# Patient Record
Sex: FEMALE | Race: BLACK OR AFRICAN AMERICAN | Employment: FULL TIME | URBAN - METROPOLITAN AREA
[De-identification: names, ages, dates, MRNs, and addresses within clinical notes are randomized per-mention and may not be internally consistent; named-entity substitution may affect disease eponyms.]

---

## 2017-01-19 ENCOUNTER — OFFICE VISIT (OUTPATIENT)
Dept: OBGYN CLINIC | Age: 22
End: 2017-01-19

## 2017-01-19 VITALS
RESPIRATION RATE: 16 BRPM | HEART RATE: 96 BPM | TEMPERATURE: 98.6 F | SYSTOLIC BLOOD PRESSURE: 141 MMHG | WEIGHT: 293 LBS | BODY MASS INDEX: 48.82 KG/M2 | HEIGHT: 65 IN | DIASTOLIC BLOOD PRESSURE: 75 MMHG

## 2017-01-19 DIAGNOSIS — N92.6 IRREGULAR MENSES: ICD-10-CM

## 2017-01-19 DIAGNOSIS — E66.01 OBESITY, MORBID, BMI 50 OR HIGHER (HCC): Primary | ICD-10-CM

## 2017-01-19 DIAGNOSIS — E28.2 PCOS (POLYCYSTIC OVARIAN SYNDROME): ICD-10-CM

## 2017-01-19 RX ORDER — NORGESTIMATE AND ETHINYL ESTRADIOL 0.25-0.035
1 KIT ORAL DAILY
Qty: 3 PACKAGE | Refills: 4 | Status: SHIPPED | OUTPATIENT
Start: 2017-01-19 | End: 2017-02-20 | Stop reason: SDUPTHER

## 2017-01-19 NOTE — MR AVS SNAPSHOT
Visit Information Date & Time Provider Department Dept. Phone Encounter #  
 1/19/2017 10:00 AM [facility] [phone] Follow-up Instructions Return in about 6 months (around 7/19/2017). Upcoming Health Maintenance Date Due  
 HPV AGE 9Y-34Y (1 of 3 - Female 3 Dose Series) 12/6/2007 INFLUENZA AGE 9 TO ADULT 8/1/2016 Allergies as of 1/19/2017  Review Complete On: 1/19/2017 By: Mesha Ramsay MD  
  
 Severity Noted Reaction Type Reactions Other Plant, Animal, Environmental  04/28/2015    Runny Nose Seasonal allergies Current Immunizations  Never Reviewed No immunizations on file. Not reviewed this visit You Were Diagnosed With   
  
 Codes Comments Obesity, morbid, BMI 50 or higher ([facility]    -  Primary ICD-10-CM: E66.01 
ICD-9-CM: 278.01   
 PCOS (polycystic ovarian syndrome)     ICD-10-CM: E28.2 ICD-9-CM: 256.4 Irregular menses     ICD-10-CM: N92.6 ICD-9-CM: 626.4 Vitals BP Pulse Temp Resp Height(growth percentile) Weight(growth percentile) 141/75 (BP 1 Location: Left arm, BP Patient Position: Sitting) 96 98.6 °F (37 °C) (Oral) 16 5' 4.8\" (1.646 m) 314 lb 12.8 oz (142.8 kg) BMI OB Status Smoking Status 52.71 kg/m2 Medically Induced Never Smoker Vitals History BMI and BSA Data Body Mass Index Body Surface Area 52.71 kg/m 2 2.56 m 2 Preferred Pharmacy Pharmacy Name Phone [id number] [phone] Your Updated Medication List  
  
   
This list is accurate as of: 1/19/17 10:53 AM.  Always use your most recent med list.  
  
  
  
  
 albuterol 90 mcg/actuation inhaler Commonly known as:  PROVENTIL HFA, VENTOLIN HFA, PROAIR HFA Take 2 Puffs by inhalation every four (4) hours as needed for Wheezing or Shortness of Breath. ibuprofen 600 mg tablet Commonly known as:  MOTRIN Take 1 Tab by mouth every eight (8) hours as needed for Pain. JUNEL 1/20 (21) 1-20 mg-mcg Tab Generic drug:  norethindrone-ethinyl estradiol Take  by mouth.  
  
 metFORMIN 1,000 mg tablet Commonly known as:  GLUCOPHAGE Take 1 Tab by mouth two (2) times daily (with meals). polyethylene glycol 17 gram packet Commonly known as:  Stacie Tristen Take 1 Packet by mouth daily. SINGULAIR 10 mg tablet Generic drug:  montelukast  
Take 10 mg by mouth daily. SYMBICORT 160-4.5 mcg/actuation HFA inhaler Generic drug:  budesonide-formoterol Take 2 Puffs by inhalation two (2) times a day. Follow-up Instructions Return in about 6 months (around 7/19/2017). Patient Instructions Starting a Weight Loss Plan: Care Instructions Your Care Instructions If you are thinking about losing weight, it can be hard to know where to start. Your doctor can help you set up a weight loss plan that best meets your needs. You may want to take a class on nutrition or exercise, or join a weight loss support group. If you have questions about how to make changes to your eating or exercise habits, ask your doctor about seeing a registered dietitian or an exercise specialist. 
It can be a big challenge to lose weight. But you do not have to make huge changes at once. Make small changes, and stick with them. When those changes become habit, add a few more changes. If you do not think you are ready to make changes right now, try to pick a date in the future. Make an appointment to see your doctor to discuss whether the time is right for you to start a plan. Follow-up care is a key part of your treatment and safety. Be sure to make and go to all appointments, and call your doctor if you are having problems. Its also a good idea to know your test results and keep a list of the medicines you take. How can you care for yourself at home? · Set realistic goals. Many people expect to lose much more weight than is likely. A weight loss of 5% to 10% of your body weight may be enough to improve your health. · Get family and friends involved to provide support. Talk to them about why you are trying to lose weight, and ask them to help. They can help by participating in exercise and having meals with you, even if they may be eating something different. · Find what works best for you. If you do not have time or do not like to cook, a program that offers meal replacement bars or shakes may be better for you. Or if you like to prepare meals, finding a plan that includes daily menus and recipes may be best. 
· Ask your doctor about other health professionals who can help you achieve your weight loss goals. ¨ A dietitian can help you make healthy changes in your diet. ¨ An exercise specialist or  can help you develop a safe and effective exercise program. 
¨ A counselor or psychiatrist can help you cope with issues such as depression, anxiety, or family problems that can make it hard to focus on weight loss. · Consider joining a support group for people who are trying to lose weight. Your doctor can suggest groups in your area. Where can you learn more? Go to http://lois-iris.info/. Enter A039 in the search box to learn more about \"Starting a Weight Loss Plan: Care Instructions. \" Current as of: February 16, 2016 Content Version: 11.1 © 2457-7189 [employer]. Care instructions adapted under license by [employer] (which disclaims liability or warranty for this information). If you have questions about a medical condition or this instruction, always ask your healthcare professional. [place] [address] any warranty or liability for your use of this information. Starting a Weight Loss Plan: Care Instructions Your Care Instructions If you are thinking about losing weight, it can be hard to know where to start. Your doctor can help you set up a weight loss plan that best meets your needs. You may want to take a class on nutrition or exercise, or join a weight loss support group. If you have questions about how to make changes to your eating or exercise habits, ask your doctor about seeing a registered dietitian or an exercise specialist. 
It can be a big challenge to lose weight. But you do not have to make huge changes at once. Make small changes, and stick with them. When those changes become habit, add a few more changes. If you do not think you are ready to make changes right now, try to pick a date in the future. Make an appointment to see your doctor to discuss whether the time is right for you to start a plan. Follow-up care is a key part of your treatment and safety. Be sure to make and go to all appointments, and call your doctor if you are having problems. Its also a good idea to know your test results and keep a list of the medicines you take. How can you care for yourself at home? · Set realistic goals. Many people expect to lose much more weight than is likely. A weight loss of 5% to 10% of your body weight may be enough to improve your health. · Get family and friends involved to provide support. Talk to them about why you are trying to lose weight, and ask them to help. They can help by participating in exercise and having meals with you, even if they may be eating something different. · Find what works best for you. If you do not have time or do not like to cook, a program that offers meal replacement bars or shakes may be better for you. Or if you like to prepare meals, finding a plan that includes daily menus and recipes may be best. 
· Ask your doctor about other health professionals who can help you achieve your weight loss goals. ¨ A dietitian can help you make healthy changes in your diet. ¨ An exercise specialist or  can help you develop a safe and effective exercise program. 
¨ A counselor or psychiatrist can help you cope with issues such as depression, anxiety, or family problems that can make it hard to focus on weight loss. · Consider joining a support group for people who are trying to lose weight. Your doctor can suggest groups in your area. Where can you learn more? Go to http://lois-iris.info/. Enter C512 in the search box to learn more about \"Starting a Weight Loss Plan: Care Instructions. \" Current as of: February 16, 2016 Content Version: 11.1 © 9032-0862 [employer]. Care instructions adapted under license by [employer] (which disclaims liability or warranty for this information). If you have questions about a medical condition or this instruction, always ask your healthcare professional. [address] any warranty or liability for your use of this information. Introducing [facility]! [employer] introduces FRM Study Course patient portal. Now you can access parts of your medical record, email your doctor's office, and request medication refills online. 1. In your internet browser, go to [email or web address] [email or web address]. Band [email or web address]/Mobile Shareholder 2. Click on the First Time User? Click Here link in the Sign In box. You will see the New Member Sign Up page. 3. Enter your FRM Study Course Access Code exactly as it appears below. You will not need to use this code after youve completed the sign-up process. If you do not sign up before the expiration date, you must request a new code. · FRM Study Course Access Code: [email or web address] Expires: 4/19/2017 10:53 AM 
 
4. Enter the last four digits of your Social Security Number (xxxx) and Date of Birth (mm/dd/yyyy) as indicated and click Submit. You will be taken to the next sign-up page. 5. Create a FRM Study Course ID.  This will be your FRM Study Course login ID and cannot be changed, so think of one that is secure and easy to remember. 6. Create a Conzoom password. You can change your password at any time. 7. Enter your Password Reset Question and Answer. This can be used at a later time if you forget your password. 8. Enter your e-mail address. You will receive e-mail notification when new information is available in [address] 9. Click Sign Up. You can now view and download portions of your medical record. 10. Click the Download Summary menu link to download a portable copy of your medical information. If you have questions, please visit the Frequently Asked Questions section of the Conzoom website. Remember, Conzoom is NOT to be used for urgent needs. For medical emergencies, dial 911. Now available from your iPhone and Android! Please provide this summary of care documentation to your next provider. Your primary care clinician is listed as NONE. If you have any questions after today's visit, please call [phone].

## 2017-01-19 NOTE — PROGRESS NOTES
Chief Complaint   Patient presents with    PCOS     Pt complains of vaginal bleeding and abdominal cramping since beginning of December. Pt also complains of fatigue since beginning of Dec also. Pt wants to address treatment options. Pt states she's now having cycles again, pt states the beginning of Dec, she had a cycle.

## 2017-01-19 NOTE — PATIENT INSTRUCTIONS

## 2017-01-19 NOTE — PROGRESS NOTES
NEW PATIENT EXAM  Young Adult Woman    SUBJECTIVE: Nathalie Melendez is a 22 y.o. female who presents for complaint s. No LMP recorded. Patient is not currently having periods (Reason: Medically Induced). GYN History  Menarche:12  Contraception:  OCP (Oral Contraceptive Pills)  Sexual History: Active but no now  Sexually transmitted diseases/infections: NO    Last pap: 2014  The prior Pap result: Normal.      Past Medical History   Diagnosis Date    Asthma     Elevated blood sugar level     PCOS (polycystic ovarian syndrome)      Past Surgical History   Procedure Laterality Date    Pr excis tendon sheath lesn,wrist/fore      Hx tonsillectomy       OB History     No data available        Family History   Problem Relation Age of Onset    Other Mother      high cholestrol, fibromyalia, lupus    Diabetes Mother     Hypertension Mother    [place] Elevated Lipids Mother     Other Maternal Grandmother      high blood presssure, heart disease, diabetes    Diabetes Maternal Grandmother     Heart Disease Maternal Grandmother     Hypertension Maternal Grandmother     Elevated Lipids Maternal Grandmother     Heart Attack Maternal Grandfather      Social History     Social History    Marital status: SINGLE     Spouse name: N/A    Number of children: N/A    Years of education: N/A     Occupational History    Not on file. Social History Main Topics    Smoking status: Never Smoker    Smokeless tobacco: Never Used    Alcohol use No    Drug use: No    Sexual activity: No     Other Topics Concern    Not on file     Social History Narrative    ** Merged History Encounter **            Current Outpatient Prescriptions   Medication Sig Dispense Refill    norgestimate-ethinyl estradiol (ORTHO-CYCLEN, SPRINTEC) 0.25-35 mg-mcg tab Take 1 Tab by mouth daily.  3 Package 4    albuterol (PROVENTIL HFA, VENTOLIN HFA, PROAIR HFA) 90 mcg/actuation inhaler Take 2 Puffs by inhalation every four (4) hours as needed for Wheezing or Shortness of Breath. 1 Inhaler 1    diphenhydrAMINE (BENADRYL) 25 mg capsule Take 2 Caps by mouth every six (6) hours as needed. 30 Cap 0    predniSONE (STERAPRED DS) 10 mg dose pack Take by mouth as directed 21 Tab 0    polyethylene glycol (MIRALAX) 17 gram packet Take 1 Packet by mouth daily. 30 Each 0    metFORMIN (GLUCOPHAGE) 1,000 mg tablet Take 1 Tab by mouth two (2) times daily (with meals). 60 Tab 4         Review of Systems:   Complete review of systems reviewed from social and history data forms. Pertinent positives in HPI. Objective:     Visit Vitals    /75 (BP 1 Location: Left arm, BP Patient Position: Sitting)    Pulse 96    Temp 98.6 °F (37 °C) (Oral)    Resp 16    Ht 5' 4.8\" (1.646 m)    Wt 314 lb 12.8 oz (142.8 kg)    BMI 52.71 kg/m2       General:  alert, cooperative, no distress, appears stated age   Skin:  Normal.   Lymph Nodes:  Cervical, supraclavicular, and axillary nodes normal.   Breast Exam: normal appearance, no masses or tenderness    Lungs:  clear to auscultation bilaterally   Heart:  regular rate and rhythm, S1, S2 normal, no murmur, click, rub or gallop   Abdomen: soft, non-tender. Bowel sounds normal. No masses,  no organomegaly   Back:  Costovertebral angle tenderness absent   Genitourinary: BUS normal. Introitus normal. Normal appearing vaginal epithelium, Vaginal discharge described as normal and physiologic.,    Normal cervix without lesions or tenderness, Uterus normal size anteverted. NT., Adnexa normal in size left and right without tenderness. Extremities:  extremities normal, atraumatic, no cyanosis or edema         ASSESSMENT:      ICD-10-CM ICD-9-CM    1. Obesity, morbid, BMI 50 or higher ([facility]) E66.01 278.01    2. PCOS (polycystic ovarian syndrome) E28.2 256.4 norgestimate-ethinyl estradiol (ORTHO-CYCLEN, SPRINTEC) 0.25-35 mg-mcg tab   3.  Irregular menses N92.6 626.4           Follow-up Disposition:  Return in about 6 months (around 7/19/2017). Today's care was reviewed and discussed with the patient. She expresses understanding and approval of today's plan.       Ángel Ahn MD

## 2017-01-29 ENCOUNTER — HOSPITAL ENCOUNTER (EMERGENCY)
Age: 22
Discharge: HOME | End: 2017-01-29
Attending: STUDENT IN AN ORGANIZED HEALTH CARE EDUCATION/TRAINING PROGRAM
Payer: SUBSIDIZED

## 2017-01-29 VITALS
SYSTOLIC BLOOD PRESSURE: 135 MMHG | HEART RATE: 120 BPM | OXYGEN SATURATION: 98 % | TEMPERATURE: 98.5 F | DIASTOLIC BLOOD PRESSURE: 81 MMHG | BODY MASS INDEX: 52.71 KG/M2 | WEIGHT: 293 LBS | RESPIRATION RATE: 20 BRPM

## 2017-01-29 DIAGNOSIS — R22.0 SWELLING OF UPPER LIP: Primary | ICD-10-CM

## 2017-01-29 LAB
GLUCOSE BLD STRIP.AUTO-MCNC: 67 MG/DL (ref 65–100)
SERVICE CMNT-IMP: NORMAL

## 2017-01-29 PROCEDURE — 82962 GLUCOSE BLOOD TEST: CPT

## 2017-01-29 PROCEDURE — 99283 EMERGENCY DEPT VISIT LOW MDM: CPT

## 2017-01-29 PROCEDURE — 74011636637 HC RX REV CODE- 636/637: Performed by: NURSE PRACTITIONER

## 2017-01-29 PROCEDURE — 74011250637 HC RX REV CODE- 250/637: Performed by: NURSE PRACTITIONER

## 2017-01-29 RX ORDER — DIPHENHYDRAMINE HCL 25 MG
50 CAPSULE ORAL
Status: COMPLETED | OUTPATIENT
Start: 2017-01-29 | End: 2017-01-29

## 2017-01-29 RX ORDER — PREDNISONE 10 MG/1
TABLET ORAL
Qty: 21 TAB | Refills: 0 | Status: SHIPPED | OUTPATIENT
Start: 2017-01-29 | End: 2017-02-23 | Stop reason: ALTCHOICE

## 2017-01-29 RX ORDER — DIPHENHYDRAMINE HCL 25 MG
50 CAPSULE ORAL
Qty: 30 CAP | Refills: 0 | Status: SHIPPED | OUTPATIENT
Start: 2017-01-29 | End: 2017-04-13

## 2017-01-29 RX ORDER — DIPHENHYDRAMINE HCL 25 MG
25 CAPSULE ORAL
COMMUNITY
End: 2017-01-29

## 2017-01-29 RX ADMIN — PREDNISONE 90 MG: 50 TABLET ORAL at 17:26

## 2017-01-29 RX ADMIN — DIPHENHYDRAMINE HYDROCHLORIDE 50 MG: 25 CAPSULE ORAL at 17:22

## 2017-01-29 NOTE — DISCHARGE INSTRUCTIONS
We hope that we have addressed all of your medical concerns. The examination and treatment you received in the Emergency Department were for an emergent problem and were not intended as complete care. It is important that you follow up with your healthcare provider(s) for ongoing care. If your symptoms worsen or do not improve as expected, and you are unable to reach your usual health care provider(s), you should return to the Emergency Department. Today's healthcare is undergoing tremendous change, and patient satisfaction surveys are one of the many tools to assess the quality of medical care. You may receive a survey from the [employer] regarding your experience in the Emergency Department. I hope that your experience has been completely positive, particularly the medical care that I provided. As such, please participate in the survey; anything less than excellent does not meet my expectations or intentions. [facility] and [address] [place] [address] participate in nationally recognized quality of care measures. If your blood pressure is greater than 120/80, as reported below, we urge that you seek medical care to address the potential of high blood pressure, commonly known as hypertension. Hypertension can be hereditary or can be caused by certain medical conditions, pain, stress, or \"white coat syndrome. \"       Please make an appointment with your health care provider(s) for follow up of your Emergency Department visit. VITALS:   Patient Vitals for the past 8 hrs:   Temp Pulse Resp BP SpO2   01/29/17 1712 98.5 °F (36.9 °C) (!) 120 20 135/81 98 %          Thank you for allowing us to provide you with medical care today. We realize that you have many choices for your emergency care needs. Please choose us in the future for any continued health care needs. Khushi Lawton, NP    [employer], [address] [facility]. Office: [phone]            Recent Results (from the past 24 hour(s))   GLUCOSE, POC    Collection Time: 01/29/17  5:25 PM   Result Value Ref Range    Glucose (POC) 67 65 - 100 mg/dL    Performed by [facility] OF [facility]        No results found.

## 2017-01-29 NOTE — ED PROVIDER NOTES
HPI Comments: Carolin Gilmore is a 22 y.o. female with Hx of asthma, PCOS, pre-DMII  who presents ambulatory with her mother to [facility] ED with cc of atraumatic upper lip and BL hand swelling/redness. Pt reports hand swelling yesterday and today woke up with swelling to her upper lip, in addition to her hand swelling. She took a 25 mg tablet of Benadryl at 1100 WNR. She denies any rashes/ itching/cough/ SOB. She denies any new environmental exposures, including soaps/lotions/foods/medications. No recent history of fevers/ chills. No recent dental procedures. PCP: Tyrell Roth MD    There are no other complaints, changes or physical findings at this time. The history is provided by the patient. Past Medical History:   Diagnosis Date    Asthma     Elevated blood sugar level     PCOS (polycystic ovarian syndrome)        Past Surgical History:   Procedure Laterality Date    Pr excis tendon sheath lesn,wrist/fore      Hx tonsillectomy           Family History:   Problem Relation Age of Onset    Other Mother      high cholestrol, fibromyalia, lupus    Diabetes Mother     Hypertension Mother    [facility] Elevated Lipids Mother     Other Maternal Grandmother      high blood presssure, heart disease, diabetes    Diabetes Maternal Grandmother     Heart Disease Maternal Grandmother     Hypertension Maternal Grandmother     Elevated Lipids Maternal Grandmother     Heart Attack Maternal Grandfather        Social History     Social History    Marital status: SINGLE     Spouse name: N/A    Number of children: N/A    Years of education: N/A     Occupational History    Not on file.      Social History Main Topics    Smoking status: Never Smoker    Smokeless tobacco: Never Used    Alcohol use No    Drug use: No    Sexual activity: No     Other Topics Concern    Not on file     Social History Narrative    ** Merged History Encounter **              ALLERGIES: Other plant, animal, environmental    Review of Systems   Constitutional: Negative for activity change, appetite change, chills and fever. HENT: Positive for facial swelling. Negative for congestion, rhinorrhea, sinus pressure, sneezing and sore throat. Eyes: Negative for pain, discharge and visual disturbance. Respiratory: Negative for cough and shortness of breath. Cardiovascular: Negative for chest pain. Gastrointestinal: Negative for abdominal pain, diarrhea, nausea and vomiting. Genitourinary: Negative for dysuria, flank pain, frequency and urgency. Musculoskeletal: Positive for arthralgias and joint swelling. Negative for back pain, gait problem, myalgias and neck pain. Skin: Negative for color change and rash. Neurological: Negative for dizziness, speech difficulty, weakness, light-headedness, numbness and headaches. Psychiatric/Behavioral: Negative for agitation, behavioral problems and confusion. All other systems reviewed and are negative. Vitals:    01/29/17 1705 01/29/17 1712   BP:  135/81   Pulse:  (!) 120   Resp:  20   Temp:  98.5 °F (36.9 °C)   SpO2:  98%   Weight: 142.8 kg (314 lb 13.1 oz)             Physical Exam   Constitutional: She is oriented to person, place, and time. She appears well-developed and well-nourished. No distress. HENT:   Head: Normocephalic and atraumatic. Right Ear: External ear normal.   Left Ear: External ear normal.   Nose: Nose normal.   Mouth/Throat: Uvula is midline, oropharynx is clear and moist and mucous membranes are normal. Normal dentition. No dental caries. No oropharyngeal exudate. Eyes: Conjunctivae and EOM are normal. Pupils are equal, round, and reactive to light. Neck: Normal range of motion. Neck supple. Cardiovascular: Normal rate, regular rhythm, normal heart sounds and intact distal pulses. Pulmonary/Chest: Effort normal and breath sounds normal.   Abdominal: Soft. There is no tenderness. There is no rebound and no guarding. Musculoskeletal: Normal range of motion. Neurological: She is alert and oriented to person, place, and time. Skin: Skin is warm and dry. Rash noted. There is erythema. Psychiatric: She has a normal mood and affect. Her behavior is normal. Judgment and thought content normal.   Nursing note and vitals reviewed. MDM  Number of Diagnoses or Management Options  Swelling of upper lip:   Diagnosis management comments: DDx; Allergic reaction, chellitis     23 yo F presents with upper lip swelling and hand swelling x 1 day. Likely allergic reaction to unknown irritant. Swelling improved while in ED and HR < 120 on PE findings. The causes and treatment of allergic reactions were discussed in detail. Allergen avoidance, use and side effects of OTC and prescription antihistamine decongestant products. Advised PCP f/u tomorrow. Agrees to d/c plan. Amount and/or Complexity of Data Reviewed  Review and summarize past medical records: yes  Discuss the patient with other providers: yes      ED Course       Procedures      LABORATORY TESTS:  Recent Results (from the past 12 hour(s))   GLUCOSE, POC    Collection Time: 01/29/17  5:25 PM   Result Value Ref Range    Glucose (POC) 67 65 - 100 mg/dL    Performed by [facility]        IMAGING RESULTS:  No orders to display       MEDICATIONS GIVEN:  Medications   diphenhydrAMINE (BENADRYL) capsule 50 mg (50 mg Oral Given 1/29/17 1722)   predniSONE (DELTASONE) tablet 90 mg (90 mg Oral Given 1/29/17 1726)       IMPRESSION:  1. Swelling of upper lip        PLAN:  1. Discharge Medication List as of 1/29/2017  6:12 PM      START taking these medications    Details   predniSONE (STERAPRED DS) 10 mg dose pack Take by mouth as directed, Print, Disp-21 Tab, R-0         CONTINUE these medications which have CHANGED    Details   diphenhydrAMINE (BENADRYL) 25 mg capsule Take 2 Caps by mouth every six (6) hours as needed. , Print, Disp-30 Cap, R-0         CONTINUE these medications which have NOT CHANGED    Details   norgestimate-ethinyl estradiol (ORTHO-CYCLEN, SPRINTEC) 0.25-35 mg-mcg tab Take 1 Tab by mouth daily. , Normal, Disp-3 Package, R-4      polyethylene glycol (MIRALAX) 17 gram packet Take 1 Packet by mouth daily. , Print, Disp-30 Each, R-0      metFORMIN (GLUCOPHAGE) 1,000 mg tablet Take 1 Tab by mouth two (2) times daily (with meals). , Normal, Disp-60 Tab, R-4      albuterol (PROVENTIL HFA, VENTOLIN HFA, PROAIR HFA) 90 mcg/actuation inhaler Take 2 Puffs by inhalation every four (4) hours as needed for Wheezing or Shortness of Breath., Print, Disp-1 Inhaler, R-1           2. Follow-up Information     Follow up With Details Comments [id number] Ezra Barragan MD Schedule an appointment as soon as possible for a visit Please see your PCP this week for ED follow up  [facility] and [address] [place] [address] EMR DEPT Go to As needed, If symptoms worsen [facility]  [phone]        3. Return to ED if worse      Discharge Note:    The patient is ready for discharge. The patient's signs, symptoms, diagnosis, and discharge instruction have been discussed and the patient has conveyed their understanding. The patient is to follow up as recommended or return to the ER should their symptoms worsen. Plan has been discussed and the patient is in agreement.     Arleen Whitney NP

## 2017-01-29 NOTE — LETTER
[address] 
[address] [facility] DEPT 
[address] [place] [address] [facility] [phone] 
[phone] Work/School Note Date: 1/29/2017 To Whom It May concern: 
 
Alvaro Vegas was seen and treated today in the emergency room by the following provider(s): 
Attending Provider: Miguel Carroll MD 
Nurse Practitioner: Erickson Dallas NP. Alisha De Jesus may return to work/school on 1/31/2017. Sincerely, Erickson Dallas NP

## 2017-01-29 NOTE — ED TRIAGE NOTES
Triage Note: stated she had some swelling in her right arm and feet yesterday and today woke up with both lips swollen and foot pain. No difficulty breathing. No rash.

## 2017-02-02 ENCOUNTER — OFFICE VISIT (OUTPATIENT)
Dept: INTERNAL MEDICINE CLINIC | Age: 22
End: 2017-02-02

## 2017-02-02 VITALS
HEIGHT: 63 IN | RESPIRATION RATE: 20 BRPM | DIASTOLIC BLOOD PRESSURE: 83 MMHG | OXYGEN SATURATION: 98 % | SYSTOLIC BLOOD PRESSURE: 130 MMHG | TEMPERATURE: 98.7 F | HEART RATE: 113 BPM | BODY MASS INDEX: 51.91 KG/M2 | WEIGHT: 293 LBS

## 2017-02-02 DIAGNOSIS — R00.0 SINUS TACHYCARDIA: ICD-10-CM

## 2017-02-02 DIAGNOSIS — T78.40XD ALLERGIC REACTION, SUBSEQUENT ENCOUNTER: ICD-10-CM

## 2017-02-02 DIAGNOSIS — N92.1 MENORRHAGIA WITH IRREGULAR CYCLE: ICD-10-CM

## 2017-02-02 DIAGNOSIS — E28.2 PCOS (POLYCYSTIC OVARIAN SYNDROME): ICD-10-CM

## 2017-02-02 DIAGNOSIS — E88.81 METABOLIC SYNDROME: ICD-10-CM

## 2017-02-02 DIAGNOSIS — Z00.00 ANNUAL PHYSICAL EXAM: Primary | ICD-10-CM

## 2017-02-02 RX ORDER — METFORMIN HYDROCHLORIDE 1000 MG/1
1000 TABLET ORAL 2 TIMES DAILY WITH MEALS
Qty: 60 TAB | Refills: 4 | Status: SHIPPED | OUTPATIENT
Start: 2017-02-02 | End: 2017-04-20 | Stop reason: SDUPTHER

## 2017-02-02 NOTE — MR AVS SNAPSHOT
Visit Information Date & Time Provider Department Dept. Phone Encounter #  
 2/2/2017  3:30 PM Albert Zambrano MD [facility] Sports Medicine and [address] [place] [address] [phone] [id number] Follow-up Instructions Return in about 4 weeks (around 2/27/2017) for Review Labs. Follow-up and Disposition History Your Appointments 2/23/2017  9:45 AM  
Any with Albert Zambrano MD  
[address] [place] [address] and Primary Care [facility]) Appt Note: 3 weeks f/u  
 [address]  
[phone]  
  
   
 [address] [id number]  
  
    
 7/20/2017  9:45 AM  
6 MONTH with Abel Davis MD  
[facility] OB[facility]GYN ([facility]) Appt Note: 6 month follow up [place] [address] [address]  
[phone]  
  
   
 [place] [address] [place] Upcoming Health Maintenance Date Due Hepatitis A Peds Age 1-18 (1 of 2 - Standard Series) 12/6/1997 DTaP/Tdap/Td series (1 - Tdap) 12/6/2003 HPV AGE 9Y-26Y (1 of 3 - Female 3 Dose Series) 12/6/2007 INFLUENZA AGE 9 TO ADULT 8/1/2016 Allergies as of 2/2/2017  Review Complete On: 1/2/5650 By: Jose Dougherty LPN Severity Noted Reaction Type Reactions Other Plant, Animal, Environmental  04/28/2015    Runny Nose Seasonal allergies Current Immunizations  Never Reviewed No immunizations on file. Not reviewed this visit You Were Diagnosed With   
  
 Codes Comments Annual physical exam    -  Primary ICD-10-CM: Z00.00 ICD-9-CM: V70.0 Allergic reaction, subsequent encounter     ICD-10-CM: T78.40XD ICD-9-CM: V58.89, 995.3 PCOS (polycystic ovarian syndrome)     ICD-10-CM: E28.2 ICD-9-CM: 256.4 Metabolic syndrome     KZI-69-ES: C91.79 ICD-9-CM: 277.7 Menorrhagia with irregular cycle     ICD-10-CM: N92.1 ICD-9-CM: 626.2 Sinus tachycardia     ICD-10-CM: R00.0 ICD-9-CM: 427.89   
 BMI 50.0-59.9, adult [facility])     ICD-10-CM: A69.25 
ICD-9-CM: V85.43 Vitals BP Pulse Temp Resp Height(growth percentile) Weight(growth percentile) 130/83 (BP 1 Location: Right arm, BP Patient Position: Sitting) (!) 113 98.7 °F (37.1 °C) (Oral) 20 5' 2.8\" (1.595 m) 309 lb 8 oz (140.4 kg) LMP SpO2 BMI OB Status Smoking Status 11/30/2016 98% 55.18 kg/m2 Polycystic Ovarian Syndrome Never Smoker Vitals History BMI and BSA Data Body Mass Index Body Surface Area  
 55.18 kg/m 2 2.49 m 2 Preferred Pharmacy Pharmacy Name Phone [id number] [phone] Your Updated Medication List  
  
   
This list is accurate as of: 2/2/17  4:49 PM.  Always use your most recent med list.  
  
  
  
  
 albuterol 90 mcg/actuation inhaler Commonly known as:  PROVENTIL HFA, VENTOLIN HFA, PROAIR HFA Take 2 Puffs by inhalation every four (4) hours as needed for Wheezing or Shortness of Breath. diphenhydrAMINE 25 mg capsule Commonly known as:  BENADRYL Take 2 Caps by mouth every six (6) hours as needed. metFORMIN 1,000 mg tablet Commonly known as:  GLUCOPHAGE Take 1 Tab by mouth two (2) times daily (with meals). norgestimate-ethinyl estradiol 0.25-35 mg-mcg Tab Commonly known as:  Krystian Shiver Take 1 Tab by mouth daily. polyethylene glycol 17 gram packet Commonly known as:  Pipo Eric Take 1 Packet by mouth daily. predniSONE 10 mg dose pack Commonly known as:  STERAPRED DS Take by mouth as directed Prescriptions Sent to Pharmacy Refills  
 metFORMIN (GLUCOPHAGE) 1,000 mg tablet 4 Sig: Take 1 Tab by mouth two (2) times daily (with meals). Class: Normal  
 Pharmacy: [id number] Ph #: [phone] Route: Oral  
  
We Performed the Following CBC WITH AUTOMATED DIFF [31128 CPT(R)] IRON PROFILE X103103 CPT(R)] LIPID PANEL [07928 CPT(R)] METABOLIC PANEL, COMPREHENSIVE [75884 CPT(R)] IA COLLECTION VENOUS BLOOD,VENIPUNCTURE [id number] CPT(R)] IA HANDLG&/OR CONVEY OF SPEC FOR TR OFFICE TO LAB [62057 CPT(R)] TSH 3RD GENERATION [57579 CPT(R)] VITAMIN D, 25 HYDROXY [id number] CPT(R)] Follow-up Instructions Return in about 4 weeks (around 2/27/2017) for Review Labs. Patient Instructions Body Mass Index: Care Instructions Your Care Instructions Body mass index (BMI) can help you see if your weight is raising your risk for health problems. It uses a formula to compare how much you weigh with how tall you are. A BMI between 18.5 and 24.9 is considered healthy. A BMI between 25 and 29.9 is considered overweight. A BMI of 30 or higher is considered obese. If your BMI is in the normal range, it means that you have a lower risk for weight-related health problems. If your BMI is in the overweight or obese range, you may be at increased risk for weight-related health problems, such as high blood pressure, heart disease, stroke, arthritis or joint pain, and diabetes. BMI is just one measure of your risk for weight-related health problems. You may be at higher risk for health problems if you are not active, you eat an unhealthy diet, or you drink too much alcohol or use tobacco products. Your Body mass index is 55.18 kg/(m^2). Follow-up care is a key part of your treatment and safety. Be sure to make and go to all appointments, and call your doctor if you are having problems. It's also a good idea to know your test results and keep a list of the medicines you take. How can you care for yourself at home? · Practice healthy eating habits. This includes eating plenty of fruits, vegetables, whole grains, lean protein, and low-fat dairy. · Get at least 30 minutes of exercise 5 days a week or more.  Brisk walking is a good choice. You also may want to do other activities, such as running, swimming, cycling, or playing tennis or team sports. · Do not smoke. Smoking can increase your risk for health problems. If you need help quitting, talk to your doctor about stop-smoking programs and medicines. These can increase your chances of quitting for good. · Limit alcohol to 2 drinks a day for men and 1 drink a day for women. Too much alcohol can cause health problems. If you have a BMI higher than 25 · Your doctor may do other tests to check your risk for weight-related health problems. This may include measuring the distance around your waist. A waist measurement of more than 40 inches in men or 35 inches in women can increase the risk of weight-related health problems. · Talk with your doctor about steps you can take to stay healthy or improve your health. You may need to make lifestyle changes to lose weight and stay healthy, such as changing your diet and getting regular exercise. Health/Fitness/Weight Loss: 1. [employer] is a great free phone or online nenita to track your food intake and exercise. Download the ap today. Log EVERYTHING you Eat and DRINK for at least 3 days straight. Then evaluate easy changes you can make for healthier living and be consistent. Find 300 calories daily you can easily get rid. 300 less calories every day will lead to a half pound weight loss per week. It gets better, this leads to a 25 pound weight loss in 1 year! Wow, a little bit goes a long way. 2. Doing CARDIO the first thing in the morning will burn the STORED or EXCESS FAT in your body, rather than the food just ate! This is called fasting cardio. The body uses the fat as energy and the fat just melts away!!! 45-60 minutes of vigorous exercise 6 days per week is an optimal maintinence goal. 
3. Drinking water REDUCES BELLY FAT! Drink 4 - 16 oz bottles 8 glasses/64 ounces of water daily. 4. During your CHALLENGE don't eat processed food! Processed foods are filled with artificial ingredients and sugars that your body DOES NOT need! 5. Have a meal plan. Know what you are going to eat for every meal so that there is no guessing and you don't resort to fast food. 6. Eat whole foods. Lean meats, fruits, veggies and nuts! In orderTO DROP WIEGHT YOU HAVE TO EAT!!!! 
 
 
YOUR PERSONAL GOALS: 
CHOOSE 1 Food Goal to start TODAY: ___________________________________ CHOOSE 1 Activity Goal to start TODAY: __________________________________ Make the smallest step you can and be consistent! :) You'll Love the Results. You are loved. You're [place] It! Where can you learn more? Go to http://lois-iris.info/. Enter S176 in the search box to learn more about \"Body Mass Index: Care Instructions. \" Current as of: February 16, 2016 Content Version: 11.1 © 9277-4696 [employer]. Care instructions adapted under license by [employer] (which disclaims liability or warranty for this information). If you have questions about a medical condition or this instruction, always ask your healthcare professional. [address] any warranty or liability for your use of this information. Introducing [facility]! Mihai Wheeler introduces [employer] patient portal. Now you can access parts of your medical record, email your doctor's office, and request medication refills online. 1. In your internet browser, go to [email or web address] 2. Click on the First Time User? Click Here link in the Sign In box. You will see the New Member Sign Up page. 3. Enter your [employer] Access Code exactly as it appears below. You will not need to use this code after youve completed the sign-up process. If you do not sign up before the expiration date, you must request a new code. · [employer] Access Code: [email or web address] Expires: 4/19/2017 10:53 AM 
 
4. Enter the last four digits of your Social Security Number (xxxx) and Date of Birth (mm/dd/yyyy) as indicated and click Submit. You will be taken to the next sign-up page. 5. Create a Gojee ID. This will be your Gojee login ID and cannot be changed, so think of one that is secure and easy to remember. 6. Create a Gojee password. You can change your password at any time. 7. Enter your Password Reset Question and Answer. This can be used at a later time if you forget your password. 8. Enter your e-mail address. You will receive e-mail notification when new information is available in [address] 9. Click Sign Up. You can now view and download portions of your medical record. 10. Click the Download Summary menu link to download a portable copy of your medical information. If you have questions, please visit the Frequently Asked Questions section of the Go[employer] website. Remember, Gojee is NOT to be used for urgent needs. For medical emergencies, dial 911. Now available from your iPhone and Android! Please provide this summary of care documentation to your next provider. Your primary care clinician is listed as Miriam Shen. If you have any questions after today's visit, please call [phone].

## 2017-02-02 NOTE — PATIENT INSTRUCTIONS
Body Mass Index: Care Instructions  Your Care Instructions    Body mass index (BMI) can help you see if your weight is raising your risk for health problems. It uses a formula to compare how much you weigh with how tall you are. A BMI between 18.5 and 24.9 is considered healthy. A BMI between 25 and 29.9 is considered overweight. A BMI of 30 or higher is considered obese. If your BMI is in the normal range, it means that you have a lower risk for weight-related health problems. If your BMI is in the overweight or obese range, you may be at increased risk for weight-related health problems, such as high blood pressure, heart disease, stroke, arthritis or joint pain, and diabetes. BMI is just one measure of your risk for weight-related health problems. You may be at higher risk for health problems if you are not active, you eat an unhealthy diet, or you drink too much alcohol or use tobacco products. Your Body mass index is 55.18 kg/(m^2). Follow-up care is a key part of your treatment and safety. Be sure to make and go to all appointments, and call your doctor if you are having problems. It's also a good idea to know your test results and keep a list of the medicines you take. How can you care for yourself at home? · Practice healthy eating habits. This includes eating plenty of fruits, vegetables, whole grains, lean protein, and low-fat dairy. · Get at least 30 minutes of exercise 5 days a week or more. Brisk walking is a good choice. You also may want to do other activities, such as running, swimming, cycling, or playing tennis or team sports. · Do not smoke. Smoking can increase your risk for health problems. If you need help quitting, talk to your doctor about stop-smoking programs and medicines. These can increase your chances of quitting for good. · Limit alcohol to 2 drinks a day for men and 1 drink a day for women. Too much alcohol can cause health problems.   If you have a BMI higher than 25  · Your doctor may do other tests to check your risk for weight-related health problems. This may include measuring the distance around your waist. A waist measurement of more than 40 inches in men or 35 inches in women can increase the risk of weight-related health problems. · Talk with your doctor about steps you can take to stay healthy or improve your health. You may need to make lifestyle changes to lose weight and stay healthy, such as changing your diet and getting regular exercise. Health/Fitness/Weight Loss: 1. [employer] is a great free phone or online nenita to track your food intake and exercise. Download the ap today. Log EVERYTHING you Eat and DRINK for at least 3 days straight. Then evaluate easy changes you can make for healthier living and be consistent. Find 300 calories daily you can easily get rid. 300 less calories every day will lead to a half pound weight loss per week. It gets better, this leads to a 25 pound weight loss in 1 year! Wow, a little bit goes a long way. 2. Doing CARDIO the first thing in the morning will burn the STORED or EXCESS FAT in your body, rather than the food just ate! This is called fasting cardio. The body uses the fat as energy and the fat just melts away!!! 45-60 minutes of vigorous exercise 6 days per week is an optimal maintinence goal.  3. Drinking water REDUCES BELLY FAT! Drink 4 - 16 oz bottles 8 glasses/64 ounces of water daily. 4. During your CHALLENGE don't eat processed food! Processed foods are filled with artificial ingredients and sugars that your body DOES NOT need! 5. Have a meal plan. Know what you are going to eat for every meal so that there is no guessing and you don't resort to fast food. 6. Eat whole foods. Lean meats, fruits, veggies and nuts!  In orderTO DROP WIEGHT YOU HAVE TO EAT!!!!      YOUR PERSONAL GOALS:  CHOOSE 1 Food Goal to start TODAY: ___________________________________    CHOOSE 1 Activity Goal to start TODAY: __________________________________    Make the smallest step you can and be consistent! :) You'll Love the Results. You are loved. You're [place] It! Where can you learn more? Go to http://lois-iris.info/. Enter S176 in the search box to learn more about \"Body Mass Index: Care Instructions. \"  Current as of: February 16, 2016  Content Version: 11.1  © 4184-0457 [employer]. Care instructions adapted under license by [employer] (which disclaims liability or warranty for this information). If you have questions about a medical condition or this instruction, always ask your healthcare professional. [address] any warranty or liability for your use of this information.

## 2017-02-02 NOTE — PROGRESS NOTES
Ms. Naseem Lawton is a 22y.o. year old female who had concerns including New Patient and ED Follow-up. HPI:  Chief Complaint   Patient presents with    New Patient     establish pcp    ED Follow-up     Sunday \"allergic reaction\", went to [facility] ER and was told to f/u with pcp; pt states last week she was having ichy rashes and swollen feet and Sunday they decided to go to ER since lips were swelling. Intermittent break outs since then, but not as sever. No recent BM issues. Lab Results   Component Value Date/Time    Hemoglobin A1c 5.9 04/28/2015 11:56 AM    Hemoglobin A1c (POC) 5.7 07/26/2016 09:03 AM     PCOS-  Heavy cycles, see Dr Ángela Marquez . She is seen by endocrinologist.     Wt Readings from Last 3 Encounters:   02/02/17 309 lb 8 oz (140.4 kg)   01/29/17 314 lb 13.1 oz (142.8 kg)   01/19/17 314 lb 12.8 oz (142.8 kg)     MALIA Del Rosario Interested in N.P.  No sex > 1 year, boyfriend. Past Medical History   Diagnosis Date    Asthma     BMI 50.0-59.9, adult ([facility] [address] 2/2/2017    Elevated blood sugar level     PCOS (polycystic ovarian syndrome)      Current Outpatient Prescriptions   Medication Sig Dispense    metFORMIN (GLUCOPHAGE) 1,000 mg tablet Take 1 Tab by mouth two (2) times daily (with meals). 60 Tab    diphenhydrAMINE (BENADRYL) 25 mg capsule Take 2 Caps by mouth every six (6) hours as needed. 30 Cap    predniSONE (STERAPRED DS) 10 mg dose pack Take by mouth as directed 21 Tab    norgestimate-ethinyl estradiol (ORTHO-CYCLEN, SPRINTEC) 0.25-35 mg-mcg tab Take 1 Tab by mouth daily. 3 Package    albuterol (PROVENTIL HFA, VENTOLIN HFA, PROAIR HFA) 90 mcg/actuation inhaler Take 2 Puffs by inhalation every four (4) hours as needed for Wheezing or Shortness of Breath. 1 Inhaler    polyethylene glycol (MIRALAX) 17 gram packet Take 1 Packet by mouth daily. 30 Each     No current facility-administered medications for this visit.         Reviewed PmHx, RxHx, FmHx, SocHx, AllgHx and updated and dated in the chart. ROS: Negative except for BOLD  General: fever, chills, fatigue  Respiratory: cough, SOB, wheezing  Cardiovascular:  CP, palpitation, GOMEZ, edema   Gastrointestinal: N/V/D, bleeding  Genito-Urinary: dysuria, hematuria  Musculoskeletal: muscle weakness, pain, swelling    OBJECTIVE:   Visit Vitals    /83 (BP 1 Location: Right arm, BP Patient Position: Sitting)    Pulse (!) 113    Temp 98.7 °F (37.1 °C) (Oral)    Resp 20    Ht 5' 2.8\" (1.595 m)    Wt 309 lb 8 oz (140.4 kg)    LMP 11/30/2016  Comment: pt states she needs to contact GYN/ pt has had period since NOV with new bc pills    SpO2 98%    BMI 55.18 kg/m2     GEN: The patient appears well, alert, oriented x 3, in no distress. Morbidly obese  ENT: bilateral TM and canal normal.  Neck supple. No adenopathy or thyromegaly. JASON. Lungs: clear bilaterally, good air entry, no wheezes, rhonchi or rales. Cardiovascular: regular rate and rhythm. S1 and S2 normal, no murmurs,  Abdomen: + BS, soft without tenderness, guarding, rebound, mass or organomegaly. Extremities: no edema, normal peripheral pulses. Neurological: normal, gross sensory and motor in tact without focal findings. Assessment/ Plan:       ICD-10-CM ICD-9-CM    1. Annual physical exam Z00.00 V70.0 MO HANDLG&/OR CONVEY OF SPEC FOR TR OFFICE TO LAB      MO COLLECTION VENOUS BLOOD,VENIPUNCTURE      CBC WITH AUTOMATED DIFF      METABOLIC PANEL, COMPREHENSIVE      LIPID PANEL      TSH 3RD GENERATION      VITAMIN D, 25 HYDROXY      IRON PROFILE   2. Allergic reaction, subsequent encounter T78.40XD V58.89      995.3    3. PCOS (polycystic ovarian syndrome) E28.2 256.4 metFORMIN (GLUCOPHAGE) 1,000 mg tablet   4. Metabolic syndrome T49.16 229.7 metFORMIN (GLUCOPHAGE) 1,000 mg tablet   5. Menorrhagia with irregular cycle N92.1 626.2 IRON PROFILE   6. Sinus tachycardia R00.0 427.89    7.  BMI 50.0-59.9, adult ([facility] Z68.43 V85.43            I have discussed the diagnosis with the patient and the intended plan as seen in the above orders. The patient has received an after-visit summary and questions were answered concerning future plans. Medication Side Effects and Warnings were discussed with patient. Follow-up Disposition:  Return in about 4 weeks (around 2/27/2017) for Review Labs.       Cornelia Plummer MD

## 2017-02-03 ENCOUNTER — HOSPITAL ENCOUNTER (EMERGENCY)
Age: 22
Discharge: HOME | End: 2017-02-03
Attending: EMERGENCY MEDICINE
Payer: SUBSIDIZED

## 2017-02-03 VITALS
HEART RATE: 118 BPM | DIASTOLIC BLOOD PRESSURE: 96 MMHG | WEIGHT: 293 LBS | SYSTOLIC BLOOD PRESSURE: 118 MMHG | BODY MASS INDEX: 55.26 KG/M2 | TEMPERATURE: 98.3 F | RESPIRATION RATE: 18 BRPM | OXYGEN SATURATION: 98 %

## 2017-02-03 DIAGNOSIS — R21 RASH: Primary | ICD-10-CM

## 2017-02-03 PROCEDURE — 99283 EMERGENCY DEPT VISIT LOW MDM: CPT

## 2017-02-03 PROCEDURE — 74011250637 HC RX REV CODE- 250/637: Performed by: EMERGENCY MEDICINE

## 2017-02-03 RX ORDER — CETIRIZINE HCL 10 MG
10 TABLET ORAL DAILY
Qty: 5 TAB | Refills: 0 | Status: SHIPPED | OUTPATIENT
Start: 2017-02-03 | End: 2017-02-08

## 2017-02-03 RX ORDER — EPINEPHRINE 0.3 MG/.3ML
0.3 INJECTION SUBCUTANEOUS
Qty: 2 SYRINGE | Refills: 1 | Status: SHIPPED | OUTPATIENT
Start: 2017-02-03 | End: 2021-08-09 | Stop reason: SDUPTHER

## 2017-02-03 RX ORDER — DIPHENHYDRAMINE HCL 25 MG
25 CAPSULE ORAL
Status: COMPLETED | OUTPATIENT
Start: 2017-02-03 | End: 2017-02-03

## 2017-02-03 RX ORDER — RANITIDINE 150 MG/1
150 TABLET, FILM COATED ORAL 2 TIMES DAILY
Qty: 10 TAB | Refills: 0 | Status: SHIPPED | OUTPATIENT
Start: 2017-02-03 | End: 2017-02-08

## 2017-02-03 RX ADMIN — DIPHENHYDRAMINE HYDROCHLORIDE 25 MG: 25 CAPSULE ORAL at 10:22

## 2017-02-03 NOTE — ED PROVIDER NOTES
Patient is a 22 y.o. female presenting with allergic reaction. Allergic Reaction           23y F with hx of asthma, PCOS here with itchy rash. Seen here 4 days ago for same plus lip swelling. Felt maybe to be allergic reaction, so started on medrol dose pack and benadryl. Has been taking the steroids but she feels they make her sleepy, so didn't take her dose today. Also not regularly taking her benadryl as it makes her sleepy. No trouble breathing. No nausea or vomiting. Has yet to schedule a follow-up appt with allergy. No new exposures. No longer with any facial swelling. She has patches of itchy, red skin that seem to come and go. When she takes the benadryl, it seems to improve. Past Medical History:   Diagnosis Date    Asthma     BMI 50.0-59.9, adult ([facility] [address] 2/2/2017    Elevated blood sugar level     PCOS (polycystic ovarian syndrome)        Past Surgical History:   Procedure Laterality Date    Pr excis tendon sheath lesn,wrist/fore      Hx tonsillectomy           Family History:   Problem Relation Age of Onset    Other Mother      high cholestrol, fibromyalia, lupus    Diabetes Mother     Hypertension Mother    [place]isse [place] Elevated Lipids Mother     Other Maternal Grandmother      high blood presssure, heart disease, diabetes    Diabetes Maternal Grandmother     Heart Disease Maternal Grandmother     Hypertension Maternal Grandmother     Elevated Lipids Maternal Grandmother     Heart Attack Maternal Grandfather        Social History     Social History    Marital status: SINGLE     Spouse name: N/A    Number of children: N/A    Years of education: N/A     Occupational History    Not on file. Social History Main Topics    Smoking status: Never Smoker    Smokeless tobacco: Never Used    Alcohol use No    Drug use: Not on file    Sexual activity: Not on file     Other Topics Concern    Not on file     Social History Narrative    ** Merged History Encounter **     MALIA Del Rosario  Interested in N.P. ALLERGIES: Other plant, animal, environmental    Review of Systems   Review of Systems   Constitutional: (-) weight loss. HEENT: (-) stiff neck   Eyes: (-) discharge. Respiratory: (-) for cough. Cardiovascular: (-) syncope. Gastrointestinal: (-) blood in stool. Genitourinary: (-) hematuria. Musculoskeletal: (-) myalgias. Neurological: (-) seizure. Skin: (-) petechiae  Lymph/Immunologic: (-) enlarged lymph nodes  All other systems reviewed and are negative. Vitals:    02/03/17 1003 02/03/17 1010   BP:  (!) 118/96   Pulse:  (!) 118   Resp:  18   Temp:  98.3 °F (36.8 °C)   SpO2:  98%   Weight: 140.6 kg (309 lb 15.5 oz)             Physical Exam Nursing note and vitals reviewed. Constitutional: oriented to person, place, and time. appears well-developed and obese No distress. Head: Normocephalic and atraumatic. Sclera anicteric  Nose: No rhinorrhea  Mouth/Throat: Oropharynx is clear and moist. Pharynx normal  Eyes: Conjunctivae are normal. Pupils are equal, round, and reactive to light. Right eye exhibits no discharge. Left eye exhibits no discharge. Neck: Painless normal range of motion. Neck supple. No LAD. Cardiovascular: Normal rate, regular rhythm, normal heart sounds and intact distal pulses. Exam reveals no gallop and no friction rub. No murmur heard. Pulmonary/Chest:  No respiratory distress. No wheezes. No rales. No rhonchi. No increased work of breathing. No accessory muscle use. Good air exchange throughout. Abdominal: soft, non-tender, no rebound or guarding. No hepatosplenomegaly. Normal bowel sounds throughout. Back: no tenderness to palpation, no deformities, no CVA tenderness  Extremities/Musculoskeletal: Normal range of motion. no tenderness. No edema. Distal extremities are neurovasc intact. Lymphadenopathy:   No adenopathy. Neurological:  Alert and oriented to person, place, and time. Coordination normal. CN 2-12 intact.  Motor and sensory function intact. Skin: Skin is warm and dry. Scattered patchy areas of erythem on the arms and trunk. No obvious hives. (+) signs of excoriation. No pallor. MDM 23y F here with itchy rash. Possibly residual allergic reactions? Not really taking all medications as directed. Appears well. No distress. No facial swelling. Advised to finish the course of steroids and use benadryl as needed. Can give work note if she feels the benadryl makes her too sleepy. Will give epi pen in case she has a more severe reaction.     ED Course       Procedures

## 2017-02-03 NOTE — DISCHARGE INSTRUCTIONS
Rash: Care Instructions  Your Care Instructions  A rash is any irritation or inflammation of the skin. Rashes have many possible causes, including allergy, infection, illness, heat, and emotional stress. Follow-up care is a key part of your treatment and safety. Be sure to make and go to all appointments, and call your doctor if you are having problems. Its also a good idea to know your test results and keep a list of the medicines you take. How can you care for yourself at home? · Wash the area with water only. Soap can make dryness and itching worse. Pat dry. · Put cold, wet cloths on the rash to reduce itching. · Keep cool, and stay out of the sun. · Leave the rash open to the air as much of the time as possible. · Sometimes petroleum jelly (Vaseline) can help relieve the discomfort caused by a rash. A moisturizing lotion, such as Cetaphil, also may help. Calamine lotion may help for rashes caused by contact with something (such as a plant or soap) that irritated the skin. Use it 3 or 4 times a day. · If your doctor prescribed a cream, use it as directed. If your doctor prescribed medicine, take it exactly as directed. · If your rash itches so badly that it interferes with your normal activities, take an over-the-counter antihistamine, such as diphenhydramine (Benadryl) or loratadine (Claritin). Read and follow all instructions on the label. When should you call for help? Call your doctor now or seek immediate medical care if:  · You have signs of infection, such as:  ¨ Increased pain, swelling, warmth, or redness. ¨ Red streaks leading from the area. ¨ Pus draining from the area. ¨ A fever. · You have joint pain along with the rash. Watch closely for changes in your health, and be sure to contact your doctor if:  · Your rash is changing or getting worse. For example, call if you have pain along with the rash, the rash is spreading, or you have new blisters.   · You do not get better after 1 week.  Where can you learn more? Go to http://lois-iris.info/. Enter X506 in the search box to learn more about \"Rash: Care Instructions. \"  Current as of: February 5, 2016  Content Version: 11.1  © 2006-2016 [employer]. Care instructions adapted under license by [employer] (which disclaims liability or warranty for this information). If you have questions about a medical condition or this instruction, always ask your healthcare professional. [place] [address] any warranty or liability for your use of this information. Allergic Reaction: Care Instructions  Your Care Instructions  An allergic reaction is an excessive response from your immune system to a medicine, chemical, food, insect bite, or other substance. A reaction can range from mild to life-threatening. Some people have a mild rash, hives, and itching or stomach cramps. In severe reactions, swelling of your tongue and throat can close up your airway so that you cannot breathe. Follow-up care is a key part of your treatment and safety. Be sure to make and go to all appointments, and call your doctor if you are having problems. It's also a good idea to know your test results and keep a list of the medicines you take. How can you care for yourself at home? · If you know what caused your allergic reaction, be sure to avoid it. Your allergy may become more severe each time you have a reaction. · Take an over-the-counter antihistamine, such as cetirizine (Zyrtec) or loratadine (Claritin), to treat mild symptoms. Read and follow directions on the label. Some antihistamines can make you feel sleepy. Do not give antihistamines to a child unless you have checked with your doctor first. Mild symptoms include sneezing or an itchy or runny nose; an itchy mouth; a few hives or mild itching; and mild nausea or stomach discomfort. · Do not scratch hives or a rash.  Put a cold, moist towel on them or take cool baths to relieve itching. Put ice packs on hives, swelling, or insect stings for 10 to 15 minutes at a time. Put a thin cloth between the ice pack and your skin. Do not take hot baths or showers. They will make the itching worse. · Your doctor may prescribe a shot of epinephrine to carry with you in case you have a severe reaction. Learn how to give yourself the shot and keep it with you at all times. Make sure it is not . · Go to the emergency room every time you have a severe reaction, even if you have used your shot of epinephrine and are feeling better. Symptoms can come back after a shot. · Wear medical alert jewelry that lists your allergies. You can buy this at most [employer]. · If your child has a severe allergy, make sure that his or her teachers, babysitters, coaches, and other caregivers know about the allergy. They should have an epinephrine shot, know how and when to give it, and have a plan to take your child to the hospital.  When should you call for help? Give an epinephrine shot if:  · You think you are having a severe allergic reaction. · You have symptoms in more than one body area, such as mild nausea and an itchy mouth. After giving an epinephrine shot call 911, even if you feel better. Call 911 if:  · You have symptoms of a severe allergic reaction. These may include:  ¨ Sudden raised, red areas (hives) all over your body. ¨ Swelling of the throat, mouth, lips, or tongue. ¨ Trouble breathing. ¨ Passing out (losing consciousness). Or you may feel very lightheaded or suddenly feel weak, confused, or restless. · You have been given an epinephrine shot, even if you feel better. Call your doctor now or seek immediate medical care if:  · You have symptoms of an allergic reaction, such as:  ¨ A rash or hives (raised, red areas on the skin). ¨ Itching. ¨ Swelling. ¨ Belly pain, nausea, or vomiting.   Watch closely for changes in your health, and be sure to contact your doctor if:  · You do not get better as expected. Where can you learn more? Go to http://lois-iris.info/. Enter D933 in the search box to learn more about \"Allergic Reaction: Care Instructions. \"  Current as of: February 12, 2016  Content Version: 11.1  © 1136-3948 [facility]. Care instructions adapted under license by [employer] (which disclaims liability or warranty for this information). If you have questions about a medical condition or this instruction, always ask your healthcare professional. [address] any warranty or liability for your use of this information.

## 2017-02-03 NOTE — ED TRIAGE NOTES
Triage:  PT states \"I was diagnosed with an allergic reaction on Sunday, had facial swelling, but it seems to keep coming back\". \"I eat out a lot so I am trying to narrow down what I am allergic to\".

## 2017-02-03 NOTE — LETTER
[address] 
[address] [facility] DEPT 
[address] [place], [place] [address] [id number] 
[phone] Work/School Note Date: 2/3/2017 To Whom It May concern: 
 
Alvaro Perez was seen and treated today in the emergency room by the following provider(s): 
Attending Provider: Randy Edge MD.   
 
Naseem Lawton may return to work on 2/6/2017. Sincerely, Randy Edge MD

## 2017-02-04 LAB
25(OH)D3+25(OH)D2 SERPL-MCNC: 14.8 NG/ML (ref 30–100)
ALBUMIN SERPL-MCNC: 4.1 G/DL (ref 3.5–5.5)
ALBUMIN/GLOB SERPL: 1.2 {RATIO} (ref 1.1–2.5)
ALP SERPL-CCNC: 71 IU/L (ref 39–117)
ALT SERPL-CCNC: 36 IU/L (ref 0–32)
AST SERPL-CCNC: 14 IU/L (ref 0–40)
BASOPHILS # BLD AUTO: 0 X10E3/UL (ref 0–0.2)
BASOPHILS NFR BLD AUTO: 0 %
BILIRUB SERPL-MCNC: 0.2 MG/DL (ref 0–1.2)
BUN SERPL-MCNC: 15 MG/DL (ref 6–20)
BUN/CREAT SERPL: 20 (ref 8–20)
CALCIUM SERPL-MCNC: 9.5 MG/DL (ref 8.7–10.2)
CHLORIDE SERPL-SCNC: 100 MMOL/L (ref 96–106)
CHOLEST SERPL-MCNC: 120 MG/DL (ref 100–199)
CO2 SERPL-SCNC: 26 MMOL/L (ref 18–29)
CREAT SERPL-MCNC: 0.76 MG/DL (ref 0.57–1)
EOSINOPHIL # BLD AUTO: 0.1 X10E3/UL (ref 0–0.4)
EOSINOPHIL NFR BLD AUTO: 0 %
ERYTHROCYTE [DISTWIDTH] IN BLOOD BY AUTOMATED COUNT: 16.3 % (ref 12.3–15.4)
GLOBULIN SER CALC-MCNC: 3.3 G/DL (ref 1.5–4.5)
GLUCOSE SERPL-MCNC: 104 MG/DL (ref 65–99)
HCT VFR BLD AUTO: 39.4 % (ref 34–46.6)
HDLC SERPL-MCNC: 55 MG/DL
HGB BLD-MCNC: 12.3 G/DL (ref 11.1–15.9)
IMM GRANULOCYTES # BLD: 0 X10E3/UL (ref 0–0.1)
IMM GRANULOCYTES NFR BLD: 0 %
IRON SATN MFR SERPL: 5 % (ref 15–55)
IRON SERPL-MCNC: 19 UG/DL (ref 27–159)
LDLC SERPL CALC-MCNC: 46 MG/DL (ref 0–99)
LYMPHOCYTES # BLD AUTO: 4 X10E3/UL (ref 0.7–3.1)
LYMPHOCYTES NFR BLD AUTO: 28 %
MCH RBC QN AUTO: 23.2 PG (ref 26.6–33)
MCHC RBC AUTO-ENTMCNC: 31.2 G/DL (ref 31.5–35.7)
MCV RBC AUTO: 74 FL (ref 79–97)
MONOCYTES # BLD AUTO: 1.3 X10E3/UL (ref 0.1–0.9)
MONOCYTES NFR BLD AUTO: 9 %
NEUTROPHILS # BLD AUTO: 9.2 X10E3/UL (ref 1.4–7)
NEUTROPHILS NFR BLD AUTO: 63 %
PLATELET # BLD AUTO: 492 X10E3/UL (ref 150–379)
POTASSIUM SERPL-SCNC: 4.4 MMOL/L (ref 3.5–5.2)
PROT SERPL-MCNC: 7.4 G/DL (ref 6–8.5)
RBC # BLD AUTO: 5.3 X10E6/UL (ref 3.77–5.28)
SODIUM SERPL-SCNC: 139 MMOL/L (ref 134–144)
TIBC SERPL-MCNC: 383 UG/DL (ref 250–450)
TRIGL SERPL-MCNC: 97 MG/DL (ref 0–149)
TSH SERPL DL<=0.005 MIU/L-ACNC: 1.54 UIU/ML (ref 0.45–4.5)
UIBC SERPL-MCNC: 364 UG/DL (ref 131–425)
VLDLC SERPL CALC-MCNC: 19 MG/DL (ref 5–40)
WBC # BLD AUTO: 14.7 X10E3/UL (ref 3.4–10.8)

## 2017-02-20 DIAGNOSIS — E28.2 PCOS (POLYCYSTIC OVARIAN SYNDROME): ICD-10-CM

## 2017-02-20 RX ORDER — NORGESTIMATE AND ETHINYL ESTRADIOL 0.25-0.035
1 KIT ORAL DAILY
Qty: 3 PACKAGE | Refills: 4 | Status: SHIPPED | OUTPATIENT
Start: 2017-02-20 | End: 2019-06-07 | Stop reason: ALTCHOICE

## 2017-02-20 NOTE — TELEPHONE ENCOUNTER
Pt's mother Jelena Belton called stating that Dr. Yulissa Garvey had recommended for pt to skip the placebo pills when taking the OCPs but her insurance is refusing to pay for the prescription early. She would like for Dr. Yulissa Garvey to write the prescription with a note on there stating that she needs to skip the placebo pills.

## 2017-02-23 ENCOUNTER — OFFICE VISIT (OUTPATIENT)
Dept: INTERNAL MEDICINE CLINIC | Age: 22
End: 2017-02-23

## 2017-02-23 VITALS
SYSTOLIC BLOOD PRESSURE: 137 MMHG | BODY MASS INDEX: 53.92 KG/M2 | HEIGHT: 62 IN | TEMPERATURE: 98.8 F | RESPIRATION RATE: 16 BRPM | WEIGHT: 293 LBS | HEART RATE: 99 BPM | DIASTOLIC BLOOD PRESSURE: 85 MMHG

## 2017-02-23 DIAGNOSIS — T78.40XD ALLERGIC REACTION, SUBSEQUENT ENCOUNTER: ICD-10-CM

## 2017-02-23 DIAGNOSIS — L50.9 HIVES: Primary | ICD-10-CM

## 2017-02-23 RX ORDER — LORATADINE 10 MG/1
10 TABLET ORAL DAILY
Qty: 60 TAB | Refills: 4 | Status: SHIPPED | OUTPATIENT
Start: 2017-02-23 | End: 2021-08-15 | Stop reason: ALTCHOICE

## 2017-02-23 RX ORDER — FAMOTIDINE 20 MG/1
40 TABLET, FILM COATED ORAL AS NEEDED
COMMUNITY
End: 2021-08-15 | Stop reason: ALTCHOICE

## 2017-02-23 NOTE — PROGRESS NOTES
Chief Complaint   Patient presents with    Allergic Reaction     folllow up x3 weeks for allergic reaction, another reaction on 2/17/17      pt presents for follow up x3 weeks for allergic reaction. Went to ER on 2/17/17 for another allergic reaction, tongue swollen. ER prescribed Famotidine and Prednisone.

## 2017-02-23 NOTE — PROGRESS NOTES
Ms. Travis Hassan is a 22y.o. year old female who had concerns including Allergic Reaction and Neck Swelling. HPI:  Chief Complaint   Patient presents with    Allergic Reaction     folllow up x3 weeks for allergic reaction, another reaction on 2/17/17    Neck Swelling     swelling on right side of neck   Pt seen at [facility] started on prednisone, completed dose. Swelling on right side of neck started yesterday after eating at a family members house, uncertain if peanut oil was used with meal. Pt thinks she might have a peanut allergy. Appt with allergist on Tuesday. Past Medical History:   Diagnosis Date    Asthma     BMI 50.0-59.9, adult ([facility] [address] 2/2/2017    Elevated blood sugar level     PCOS (polycystic ovarian syndrome)      Current Outpatient Prescriptions   Medication Sig Dispense    famotidine (PEPCID) 20 mg tablet Take 20 mg by mouth two (2) times a day.  loratadine (CLARITIN) 10 mg tablet Take 1 Tab by mouth daily. Indications: hives, swelling 60 Tab    norgestimate-ethinyl estradiol (ORTHO-CYCLEN, SPRINTEC) 0.25-35 mg-mcg tab Take 1 Tab by mouth daily. SKIP THE PLACEBO PILLS 3 Package    EPINEPHrine (EPIPEN) 0.3 mg/0.3 mL injection 0.3 mL by IntraMUSCular route once as needed for up to 1 dose. 2 Syringe    metFORMIN (GLUCOPHAGE) 1,000 mg tablet Take 1 Tab by mouth two (2) times daily (with meals). 60 Tab    diphenhydrAMINE (BENADRYL) 25 mg capsule Take 2 Caps by mouth every six (6) hours as needed. 30 Cap    albuterol (PROVENTIL HFA, VENTOLIN HFA, PROAIR HFA) 90 mcg/actuation inhaler Take 2 Puffs by inhalation every four (4) hours as needed for Wheezing or Shortness of Breath. 1 Inhaler     No current facility-administered medications for this visit. Reviewed PmHx, RxHx, FmHx, SocHx, AllgHx and updated and dated in the chart.     ROS: Negative except for BOLD  General: fever, chills, fatigue  Respiratory: cough, SOB, wheezing  Cardiovascular:  CP, palpitation, GOMEZ, edema Gastrointestinal: N/V/D, bleeding  Genito-Urinary: dysuria, hematuria  Musculoskeletal: muscle weakness, pain, swelling    OBJECTIVE:   Visit Vitals    /85    Pulse 99    Temp 98.8 °F (37.1 °C)    Resp 16    Ht 5' 2\" (1.575 m)    Wt 308 lb (139.7 kg)    LMP 11/30/2016    BMI 56.33 kg/m2     GEN: The patient appears well, alert, oriented x 3, in no distress. Morbidly obese  ENT: bilateral TM and canal normal.  Neck supple. No adenopathy or thyromegaly. JASON. Lungs: clear bilaterally, good air entry, no wheezes, rhonchi or rales. Cardiovascular: regular rate and rhythm. S1 and S2 normal, no murmurs,  Abdomen: + BS, soft without tenderness, guarding, rebound, mass or organomegaly. Extremities: no edema, normal peripheral pulses. Neurological: normal, gross sensory and motor in tact without focal findings. Right lateral neck with hive about 5 cm wide. No tongue swelling. Normal speech      Assessment/ Plan:       ICD-10-CM ICD-9-CM    1. Hives L50.9 708.9 famotidine (PEPCID) 20 mg tablet      loratadine (CLARITIN) 10 mg tablet   2. Allergic reaction, subsequent encounter T78.40XD V58.89 famotidine (PEPCID) 20 mg tablet     995.3 loratadine (CLARITIN) 10 mg tablet     Start long acting antihistamine daily. Avoid peanuts, she has epi pen. Follow up with allergist      I have discussed the diagnosis with the patient and the intended plan as seen in the above orders. The patient has received an after-visit summary and questions were answered concerning future plans. Medication Side Effects and Warnings were discussed with patient.     Follow-up Disposition: Not on R Camila Pinzon MD

## 2017-02-23 NOTE — MR AVS SNAPSHOT
Visit Information Date & Time Provider Department Dept. Phone Encounter #  
 2/23/2017  9:45 AM Tyrell Roth MD [facility] and [address] [place] [address] [phone] [id number] Your Appointments 7/20/2017  9:45 AM  
6 MONTH with Mely Woodard MD  
[facility] ([address]) Appt Note: 6 month follow up [place] [address] [facility] [address]  
[phone]  
  
   
 [place] [address] Upcoming Health Maintenance Date Due Hepatitis A Peds Age 1-18 (1 of 2 - Standard Series) 12/6/1997 DTaP/Tdap/Td series (1 - Tdap) 12/6/2003 HPV AGE 9Y-26Y (1 of 3 - Female 3 Dose Series) 12/6/2007 INFLUENZA AGE 9 TO ADULT 8/1/2016 Allergies as of 2/23/2017  Review Complete On: 2/3/2017 By: Tim Woody RN Severity Noted Reaction Type Reactions Other Plant, Animal, Environmental  04/28/2015    Runny Nose Seasonal allergies Current Immunizations  Never Reviewed No immunizations on file. Not reviewed this visit Vitals BP  
  
  
  
  
  
 137/85 BMI and BSA Data Body Mass Index Body Surface Area  
 56.33 kg/m 2 2.47 m 2 Preferred Pharmacy Pharmacy Name Phone [id number] [phone] Your Updated Medication List  
  
   
This list is accurate as of: 2/23/17 10:21 AM.  Always use your most recent med list.  
  
  
  
  
 albuterol 90 mcg/actuation inhaler Commonly known as:  PROVENTIL HFA, VENTOLIN HFA, PROAIR HFA Take 2 Puffs by inhalation every four (4) hours as needed for Wheezing or Shortness of Breath. diphenhydrAMINE 25 mg capsule Commonly known as:  BENADRYL Take 2 Caps by mouth every six (6) hours as needed. EPINEPHrine 0.3 mg/0.3 mL injection Commonly known as:  EPIPEN  
0.3 mL by IntraMUSCular route once as needed for up to 1 dose. famotidine 20 mg tablet Commonly known as:  PEPCID Take 20 mg by mouth two (2) times a day. metFORMIN 1,000 mg tablet Commonly known as:  GLUCOPHAGE Take 1 Tab by mouth two (2) times daily (with meals). norgestimate-ethinyl estradiol 0.25-35 mg-mcg Tab Commonly known as:  Sophie [facility] Take 1 Tab by mouth daily. SKIP THE PLACEBO PILLS Introducing [facility] SERVICES! Shaina Estrada introduces EverySignal patient portal. Now you can access parts of your medical record, email your doctor's office, and request medication refills online. 1. In your internet browser, go to [email or web address] 2. Click on the First Time User? Click Here link in the Sign In box. You will see the New Member Sign Up page. 3. Enter your EverySignal Access Code exactly as it appears below. You will not need to use this code after youve completed the sign-up process. If you do not sign up before the expiration date, you must request a new code. · EverySignal Access Code: [email or web address] Expires: 4/19/2017 10:53 AM 
 
4. Enter the last four digits of your Social Security Number (xxxx) and Date of Birth (mm/dd/yyyy) as indicated and click Submit. You will be taken to the next sign-up page. 5. Create a EverySignal ID. This will be your EverySignal login ID and cannot be changed, so think of one that is secure and easy to remember. 6. Create a EverySignal password. You can change your password at any time. 7. Enter your Password Reset Question and Answer. This can be used at a later time if you forget your password. 8. Enter your e-mail address. You will receive e-mail notification when new information is available in [address] 9. Click Sign Up. You can now view and download portions of your medical record. 10. Click the Download Summary menu link to download a portable copy of your medical information.  
 
If you have questions, please visit the Frequently Asked Questions section of the Blink Messenger. Remember, VerbalizeIthart is NOT to be used for urgent needs. For medical emergencies, dial 911. Now available from your iPhone and Android! Please provide this summary of care documentation to your next provider. Your primary care clinician is listed as Mike Maker. If you have any questions after today's visit, please call [phone].

## 2017-03-10 ENCOUNTER — OFFICE VISIT (OUTPATIENT)
Dept: INTERNAL MEDICINE CLINIC | Age: 22
End: 2017-03-10

## 2017-03-10 VITALS
SYSTOLIC BLOOD PRESSURE: 122 MMHG | OXYGEN SATURATION: 98 % | WEIGHT: 293 LBS | HEART RATE: 121 BPM | DIASTOLIC BLOOD PRESSURE: 85 MMHG | RESPIRATION RATE: 16 BRPM | BODY MASS INDEX: 55.79 KG/M2 | TEMPERATURE: 98.7 F

## 2017-03-10 DIAGNOSIS — J04.0 LARYNGITIS: Primary | ICD-10-CM

## 2017-03-10 DIAGNOSIS — J11.1 INFLUENZA-LIKE ILLNESS: ICD-10-CM

## 2017-03-10 DIAGNOSIS — R00.0 TACHYCARDIA: ICD-10-CM

## 2017-03-10 RX ORDER — OSELTAMIVIR PHOSPHATE 75 MG/1
75 CAPSULE ORAL DAILY
Qty: 10 CAP | Refills: 0 | Status: SHIPPED | OUTPATIENT
Start: 2017-03-10 | End: 2017-03-20

## 2017-03-10 NOTE — PROGRESS NOTES
Ms. Shayna Christianson is a 22y.o. year old female who had concerns including Cold Symptoms. HPI:  Chief Complaint   Patient presents with    Cold Symptoms     cold/flu like symptoms x4 days     Sick contacts with flu. No known fevers. She hasn't taken her BP  No cough, poor water or fluid intake. Past Medical History:   Diagnosis Date    Asthma     BMI 50.0-59.9, adult ([facility] [address] [facility] 2/2/2017    Elevated blood sugar level     PCOS (polycystic ovarian syndrome)      Current Outpatient Prescriptions   Medication Sig Dispense    oseltamivir (TAMIFLU) 75 mg capsule Take 1 Cap by mouth daily for 10 days. Indications: INFLUENZA PREVENTION 10 Cap    famotidine (PEPCID) 20 mg tablet Take 40 mg by mouth two (2) times a day.  loratadine (CLARITIN) 10 mg tablet Take 1 Tab by mouth daily. Indications: hives, swelling 60 Tab    norgestimate-ethinyl estradiol (ORTHO-CYCLEN, SPRINTEC) 0.25-35 mg-mcg tab Take 1 Tab by mouth daily. SKIP THE PLACEBO PILLS 3 Package    EPINEPHrine (EPIPEN) 0.3 mg/0.3 mL injection 0.3 mL by IntraMUSCular route once as needed for up to 1 dose. 2 Syringe    metFORMIN (GLUCOPHAGE) 1,000 mg tablet Take 1 Tab by mouth two (2) times daily (with meals). 60 Tab    diphenhydrAMINE (BENADRYL) 25 mg capsule Take 2 Caps by mouth every six (6) hours as needed. 30 Cap    albuterol (PROVENTIL HFA, VENTOLIN HFA, PROAIR HFA) 90 mcg/actuation inhaler Take 2 Puffs by inhalation every four (4) hours as needed for Wheezing or Shortness of Breath. 1 Inhaler     No current facility-administered medications for this visit. Reviewed PmHx, RxHx, FmHx, SocHx, AllgHx and updated and dated in the chart.     ROS: Negative except for BOLD  General: fever, chills, fatigue  Respiratory: cough, SOB, wheezing  Cardiovascular:  CP, palpitation, GOMEZ, edema   Gastrointestinal: N/V/D, bleeding  Genito-Urinary: dysuria, hematuria  Musculoskeletal: muscle weakness, pain, swelling    OBJECTIVE:   Visit Vitals    /85  Pulse (!) 121    Temp 98.7 °F (37.1 °C)    Resp 16    Wt 305 lb (138.3 kg)    SpO2 98%    BMI 55.79 kg/m2     GEN: The patient appears mildly ill, alert, oriented x 3, in no distress. ENT: bilateral TM and canal normal.  Neck supple. No adenopathy or thyromegaly. JASON. Pharyngeal erythema   Lungs: clear bilaterally, good air entry, no wheezes, rhonchi or rales. Cardiovascular: regular rate and rhythm. S1 and S2 normal, no murmurs,  Abdomen: + BS, soft without tenderness, guarding, rebound, mass or organomegaly. Extremities: no edema, normal peripheral pulses. Neurological: normal, gross sensory and motor in tact without focal findings. Assessment/ Plan:       ICD-10-CM ICD-9-CM    1. Laryngitis J04.0 464.00    2. Influenza-like illness R69 799.89 oseltamivir (TAMIFLU) 75 mg capsule   3. Tachycardia R00.0 785.0      No cough, poor water or fluid intake. Most likely cause of tachycardia. Pt is asymptomatic. If cough starts or sputum can follow up for eval for pulmonary disease. I have discussed the diagnosis with the patient and the intended plan as seen in the above orders. The patient has received an after-visit summary and questions were answered concerning future plans. Medication Side Effects and Warnings were discussed with patient.     Follow-up Disposition: Not on R Camila Pinzon MD

## 2017-03-10 NOTE — PROGRESS NOTES
Marisa Ghotra is a 22 y.o. female  Chief Complaint   Patient presents with    Cold Symptoms     cold/flu like symptoms x4 days     Pt presents with cold/flu like symptoms x4 days, chills, cough, runny nose, no fever. Has tried mucinex x3 days and non effective. Has been around others with flu like symptoms. 1. Have you been to the ER, urgent care clinic since your last visit? Hospitalized since your last visit? No    2. Have you seen or consulted any other health care providers outside of the [facility] since your last visit? Include any pap smears or colon screening.  No   PHQ 2 / 9, over the last two weeks 3/10/2017   Little interest or pleasure in doing things Not at all   Feeling down, depressed or hopeless Not at all   Total Score PHQ 2 0

## 2017-03-10 NOTE — MR AVS SNAPSHOT
Visit Information Date & Time Provider Department Dept. Phone Encounter #  
 3/10/2017  9:30 AM Brandon Holt MD [facility] Sports Medicine and [address] [place] [address] [phone] [id number] Your Appointments 3/16/2017  9:30 AM  
New Patient with Vj Brown MD  
[facility] at [facility]) Appt Note: new leukocytosis [address] [place]  
[phone] [address]  
  
    
 7/20/2017  9:45 AM  
6 MONTH with Carole Francisco MD  
[facility] ([facility]) Appt Note: 6 month follow up [place] [address] [address]  
[phone]  
  
   
 [place] [address] [facility] [address] [place] [address] Upcoming Health Maintenance Date Due Hepatitis A Peds Age 1-18 (1 of 2 - Standard Series) 12/6/1997 DTaP/Tdap/Td series (1 - Tdap) 12/6/2003 HPV AGE 9Y-26Y (1 of 3 - Female 3 Dose Series) 12/6/2007 Pneumococcal 19-64 Highest Risk (1 of 3 - PCV13) 12/6/2015 INFLUENZA AGE 9 TO ADULT 8/1/2016 Allergies as of 3/10/2017  Review Complete On: 2/3/2017 By: Deliah Frankel, RN Severity Noted Reaction Type Reactions Other Plant, Animal, Environmental  04/28/2015    Runny Nose Seasonal allergies Current Immunizations  Never Reviewed No immunizations on file. Not reviewed this visit You Were Diagnosed With   
  
 Codes Comments Laryngitis    -  Primary ICD-10-CM: J04.0 ICD-9-CM: 464.00 Influenza-like illness     ICD-10-CM: R69 
ICD-9-CM: 799.89 Vitals BP Pulse Temp Resp Weight(growth percentile) SpO2  
 122/85 (!) 121 98.7 °F (37.1 °C) 16 305 lb (138.3 kg) 98% BMI OB Status Smoking Status 55.79 kg/m2 Polycystic Ovarian Syndrome Never Smoker BMI and BSA Data Body Mass Index Body Surface Area  55.79 kg/m 2 2.46 m 2  
  
  
 Preferred Pharmacy Pharmacy Name Phone [id number] Chiu [phone] Your Updated Medication List  
  
   
This list is accurate as of: 3/10/17 10:43 AM.  Always use your most recent med list.  
  
  
  
  
 albuterol 90 mcg/actuation inhaler Commonly known as:  PROVENTIL HFA, VENTOLIN HFA, PROAIR HFA Take 2 Puffs by inhalation every four (4) hours as needed for Wheezing or Shortness of Breath. diphenhydrAMINE 25 mg capsule Commonly known as:  BENADRYL Take 2 Caps by mouth every six (6) hours as needed. EPINEPHrine 0.3 mg/0.3 mL injection Commonly known as:  EPIPEN  
0.3 mL by IntraMUSCular route once as needed for up to 1 dose.  
  
 famotidine 20 mg tablet Commonly known as:  PEPCID Take 40 mg by mouth two (2) times a day. loratadine 10 mg tablet Commonly known as:  Cristina Syed Take 1 Tab by mouth daily. Indications: hives, swelling  
  
 metFORMIN 1,000 mg tablet Commonly known as:  GLUCOPHAGE Take 1 Tab by mouth two (2) times daily (with meals). norgestimate-ethinyl estradiol 0.25-35 mg-mcg Tab Commonly known as:  Jestine Pop Take 1 Tab by mouth daily. SKIP THE PLACEBO PILLS  
  
 oseltamivir 75 mg capsule Commonly known as:  TAMIFLU Take 1 Cap by mouth daily for 10 days. Indications: INFLUENZA PREVENTION Prescriptions Sent to Pharmacy Refills  
 oseltamivir (TAMIFLU) 75 mg capsule 0 Sig: Take 1 Cap by mouth daily for 10 days. Indications: INFLUENZA PREVENTION Class: Normal  
 Pharmacy: [address] [place] [address] #: [phone] Route: Oral  
  
Introducing [facility] & HEALTH SERVICES! Keyanna Quinn introduces EQAL patient portal. Now you can access parts of your medical record, email your doctor's office, and request medication refills online. 1. In your internet browser, go to [email or web address] 2. Click on the First Time User? Click Here link in the Sign In box. You will see the New Member Sign Up page. 3. Enter your Xanic Access Code exactly as it appears below. You will not need to use this code after youve completed the sign-up process. If you do not sign up before the expiration date, you must request a new code. · Xanic Access Code: [email or web address] Expires: 4/19/2017 10:53 AM 
 
4. Enter the last four digits of your Social Security Number (xxxx) and Date of Birth (mm/dd/yyyy) as indicated and click Submit. You will be taken to the next sign-up page. 5. Create a Xanic ID. This will be your Xanic login ID and cannot be changed, so think of one that is secure and easy to remember. 6. Create a Xanic password. You can change your password at any time. 7. Enter your Password Reset Question and Answer. This can be used at a later time if you forget your password. 8. Enter your e-mail address. You will receive e-mail notification when new information is available in [address] 9. Click Sign Up. You can now view and download portions of your medical record. 10. Click the Download Summary menu link to download a portable copy of your medical information. If you have questions, please visit the Frequently Asked Questions section of the Xanic website. Remember, Xanic is NOT to be used for urgent needs. For medical emergencies, dial 911. Now available from your iPhone and Android! Please provide this summary of care documentation to your next provider. Your primary care clinician is listed as [employer] If you have any questions after today's visit, please call [phone].

## 2017-03-10 NOTE — LETTER
NOTIFICATION RETURN TO WORK / SCHOOL 
 
3/10/2017 10:45 AM 
 
Ms. Mark Loredo [address] [facility] [address] [address] To Whom It May Concern: 
 
Mark Loredo is currently under the care of Danielle Siegel 03/09/2017 thru 03/10/2017. She will return to work/school on: 03/13/2017. If there are questions or concerns please have the patient contact our office.  
 
 
 
Sincerely, 
 
 
Julia Cullen MD

## 2017-03-16 ENCOUNTER — HOSPITAL ENCOUNTER (OUTPATIENT)
Dept: INFUSION THERAPY | Age: 22
Discharge: HOME | End: 2017-03-16
Payer: SUBSIDIZED

## 2017-03-16 ENCOUNTER — OFFICE VISIT (OUTPATIENT)
Dept: ONCOLOGY | Age: 22
End: 2017-03-16

## 2017-03-16 VITALS
TEMPERATURE: 98 F | BODY MASS INDEX: 53.92 KG/M2 | HEART RATE: 90 BPM | RESPIRATION RATE: 16 BRPM | DIASTOLIC BLOOD PRESSURE: 83 MMHG | HEIGHT: 62 IN | OXYGEN SATURATION: 98 % | SYSTOLIC BLOOD PRESSURE: 122 MMHG | WEIGHT: 293 LBS

## 2017-03-16 DIAGNOSIS — D50.9 IRON DEFICIENCY ANEMIA, UNSPECIFIED IRON DEFICIENCY ANEMIA TYPE: ICD-10-CM

## 2017-03-16 DIAGNOSIS — D72.823 LEUKEMOID REACTION: Primary | ICD-10-CM

## 2017-03-16 PROBLEM — D75.839 THROMBOCYTOSIS: Status: ACTIVE | Noted: 2017-03-16

## 2017-03-16 LAB
BASOPHILS # BLD AUTO: 0 K/UL (ref 0–0.1)
BASOPHILS # BLD: 0 % (ref 0–1)
EOSINOPHIL # BLD: 0.1 K/UL (ref 0–0.4)
EOSINOPHIL NFR BLD: 2 % (ref 0–7)
ERYTHROCYTE [DISTWIDTH] IN BLOOD BY AUTOMATED COUNT: 16.2 % (ref 11.5–14.5)
FERRITIN SERPL-MCNC: 46 NG/ML (ref 8–252)
HCT VFR BLD AUTO: 36.7 % (ref 35–47)
HGB BLD-MCNC: 10.8 G/DL (ref 11.5–16)
LYMPHOCYTES # BLD AUTO: 45 % (ref 12–49)
LYMPHOCYTES # BLD: 3.2 K/UL (ref 0.8–3.5)
MCH RBC QN AUTO: 22.2 PG (ref 26–34)
MCHC RBC AUTO-ENTMCNC: 29.4 G/DL (ref 30–36.5)
MCV RBC AUTO: 75.5 FL (ref 80–99)
MONOCYTES # BLD: 0.4 K/UL (ref 0–1)
MONOCYTES NFR BLD AUTO: 5 % (ref 5–13)
NEUTS SEG # BLD: 3.4 K/UL (ref 1.8–8)
NEUTS SEG NFR BLD AUTO: 48 % (ref 32–75)
PLATELET # BLD AUTO: 336 K/UL (ref 150–400)
RBC # BLD AUTO: 4.86 M/UL (ref 3.8–5.2)
WBC # BLD AUTO: 7.1 K/UL (ref 3.6–11)

## 2017-03-16 PROCEDURE — 85025 COMPLETE CBC W/AUTO DIFF WBC: CPT | Performed by: NURSE PRACTITIONER

## 2017-03-16 PROCEDURE — 36415 COLL VENOUS BLD VENIPUNCTURE: CPT | Performed by: NURSE PRACTITIONER

## 2017-03-16 PROCEDURE — 82728 ASSAY OF FERRITIN: CPT | Performed by: NURSE PRACTITIONER

## 2017-03-16 NOTE — PROGRESS NOTES
21 y/o AAF here for initial appt with MD for leukocytosis. She has had elevated WBC and ANC for past 4 years. She was in the ED for allergic reaction a few times, she said she was told she has chronic uriticaria by [facility] All[facility]ist, And ANC/WBC/NEUTRO/LYMPH elevated on lab draw,she may need a ferritin lab. Denies receiving blood or iron transfusions. Pt reports her menstrual cycle is heavy but she is on birth control that has helped. Ms. Sabino Cardenas has a reminder for a \"due or due soon\" health maintenance. I have asked that she contact her primary care provider for follow-up on this health maintenance.

## 2017-03-16 NOTE — PROGRESS NOTES
[facility] Lab Visit:    9511:  Pt arrived ambulatory from follow-up appt with Dr. Tarsha Cody for labs. Labs drawn peripherally without difficulty. Recent Results (from the past 12 hour(s))   CBC WITH AUTOMATED DIFF    Collection Time: 03/16/17 10:25 AM   Result Value Ref Range    WBC 7.1 3.6 - 11.0 K/uL    RBC 4.86 3.80 - 5.20 M/uL    HGB 10.8 (L) 11.5 - 16.0 g/dL    HCT 36.7 35.0 - 47.0 %    MCV 75.5 (L) 80.0 - 99.0 FL    MCH 22.2 (L) 26.0 - 34.0 PG    MCHC 29.4 (L) 30.0 - 36.5 g/dL    RDW 16.2 (H) 11.5 - 14.5 %    PLATELET 291 739 - 896 K/uL    NEUTROPHILS 48 32 - 75 %    LYMPHOCYTES 45 12 - 49 %    MONOCYTES 5 5 - 13 %    EOSINOPHILS 2 0 - 7 %    BASOPHILS 0 0 - 1 %    ABS. NEUTROPHILS 3.4 1.8 - 8.0 K/UL    ABS. LYMPHOCYTES 3.2 0.8 - 3.5 K/UL    ABS. MONOCYTES 0.4 0.0 - 1.0 K/UL    ABS. EOSINOPHILS 0.1 0.0 - 0.4 K/UL    ABS. BASOPHILS 0.0 0.0 - 0.1 K/UL   FERRITIN    Collection Time: 03/16/17 10:25 AM   Result Value Ref Range    Ferritin 46 8 - 252 NG/ML       1030: Departed [facility] ambulatory and in no distress.

## 2017-03-16 NOTE — PROGRESS NOTES
Hematology Consultation        Patient: Yandy Holt MRN: [id number]  SSN: [id number]    YOB: 1995  Age: 22 y.o. Sex: female      Subjective:      Yandy Holt is a 22 y.o. female who I am seeing for leukocytosis. She is asymptomatic. Routine laboratory studies showed the abnormality. She denies fever or weight loss. She had received oral steroids in the recent past.       Review of Systems:    Constitutional: negative  Eyes: negative  Ears, Nose, Mouth, Throat, and Face: negative  Respiratory: negative  Cardiovascular: negative  Gastrointestinal: negative  Genitourinary:negative  Integument/Breast: negative  Hematologic/Lymphatic: negative  Musculoskeletal:negative  Neurological: negative      Past Medical History:   Diagnosis Date    Asthma     [facility] 50.[facility]59.[facility] 2/2/2017    Elevated blood sugar level     PCOS (polycystic ovarian syndrome)      Past Surgical History:   Procedure Laterality Date    EXCIS TENDON SHEATH LESN,WRIST/FORE      HX TONSILLECTOMY        Family History   Problem Relation Age of Onset    Other Mother      high cholestrol, fibromyalia, lupus    Diabetes Mother     Hypertension Mother    [facility] Elevated Lipids Mother     Other Maternal Grandmother      high blood presssure, heart disease, diabetes    Diabetes Maternal Grandmother     Heart Disease Maternal Grandmother     Hypertension Maternal Grandmother     Elevated Lipids Maternal Grandmother     Heart Attack Maternal Grandfather      Social History   Substance Use Topics    Smoking status: Never Smoker    Smokeless tobacco: Never Used    Alcohol use No      Prior to Admission medications    Medication Sig Start Date End Date Taking? Authorizing Provider   famotidine (PEPCID) 20 mg tablet Take 40 mg by mouth two (2) times a day. Yes Historical Provider   loratadine (CLARITIN) 10 mg tablet Take 1 Tab by mouth daily.  Indications: hives, swelling 2/23/17  Yes Searcy Koyanagi, MD norgestimate-ethinyl estradiol (ORTHO-CYCLEN, SPRINTEC) 0.25-35 mg-mcg tab Take 1 Tab by mouth daily. SKIP THE PLACEBO PILLS 2/20/17  Yes Max Do MD   EPINEPHrine (EPIPEN) 0.3 mg/0.3 mL injection 0.3 mL by IntraMUSCular route once as needed for up to 1 dose. 2/3/17  Yes Grayson Ramos MD   metFORMIN (GLUCOPHAGE) 1,000 mg tablet Take 1 Tab by mouth two (2) times daily (with meals). 2/2/17  Yes Tyrell Roth MD   diphenhydrAMINE (BENADRYL) 25 mg capsule Take 2 Caps by mouth every six (6) hours as needed. 1/29/17  Yes Katie Mendenhall NP   albuterol (PROVENTIL HFA, VENTOLIN HFA, PROAIR HFA) 90 mcg/actuation inhaler Take 2 Puffs by inhalation every four (4) hours as needed for Wheezing or Shortness of Breath. 5/30/15  Yes MICHEAL Harris   oseltamivir (TAMIFLU) 75 mg capsule Take 1 Cap by mouth daily for 10 days. Indications: INFLUENZA PREVENTION 3/10/17 3/20/17  Tyrell Roth MD              Allergies   Allergen Reactions    Other Plant, Animal, Environmental Runny Nose     Seasonal allergies           Objective:     Vitals:    03/16/17 1000   BP: 122/83   Pulse: 90   Resp: 16   Temp: 98 °F (36.7 °C)   TempSrc: Oral   SpO2: 98%   Weight: 307 lb 1.6 oz (139.3 kg)   Height: 5' 2\" (1.575 m)            Physical Exam:    GENERAL: alert, cooperative  EYE: negative  LYMPHATIC: Cervical, supraclavicular, and axillary nodes normal.   THROAT & NECK: normal and no erythema or exudates noted. LUNG: clear to auscultation bilaterally  HEART: regular rate and rhythm  ABDOMEN: soft, non-tender  EXTREMITIES:  no edema  SKIN: Normal.  NEUROLOGIC: negative        Lab Results   Component Value Date/Time    WBC 7.1 03/16/2017 10:25 AM    HGB 10.8 03/16/2017 10:25 AM    HCT 36.7 03/16/2017 10:25 AM    PLATELET 859 38/42/7866 10:25 AM    MCV 75.5 03/16/2017 10:25 AM        Lab Results   Component Value Date/Time    Ferritin 46 03/16/2017 10:25 AM           Assessment:     1.  Leukocytosis     Mainly neutrophilia. Has now resolved. Likely secondary to recent steroid use  Will repeat CBC      2. Anemia    Iron deficiency  Arrange for IV iron  I discussed the pros and cons of IV iron. She is agreeable to IV iron. Plan:        1. Repeat CBC with diff, serum ferritin  2. Return in 6 months       Signed by: John Paul Wallace MD                     March 16, 2017       CC.  Nai Macias MD

## 2017-03-22 RX ORDER — SODIUM CHLORIDE 9 MG/ML
25 INJECTION, SOLUTION INTRAVENOUS CONTINUOUS
Status: DISPENSED | OUTPATIENT
Start: 2017-03-23 | End: 2017-03-23

## 2017-03-23 ENCOUNTER — HOSPITAL ENCOUNTER (OUTPATIENT)
Dept: INFUSION THERAPY | Age: 22
Discharge: HOME | End: 2017-03-23
Payer: SUBSIDIZED

## 2017-03-23 VITALS
RESPIRATION RATE: 18 BRPM | SYSTOLIC BLOOD PRESSURE: 150 MMHG | OXYGEN SATURATION: 100 % | HEART RATE: 89 BPM | DIASTOLIC BLOOD PRESSURE: 77 MMHG | TEMPERATURE: 98 F

## 2017-03-23 PROCEDURE — 74011250636 HC RX REV CODE- 250/636: Performed by: INTERNAL MEDICINE

## 2017-03-23 PROCEDURE — 96365 THER/PROPH/DIAG IV INF INIT: CPT

## 2017-03-23 RX ORDER — SODIUM CHLORIDE 9 MG/ML
25 INJECTION, SOLUTION INTRAVENOUS CONTINUOUS
Status: DISPENSED | OUTPATIENT
Start: 2017-03-23 | End: 2017-03-24

## 2017-03-23 RX ORDER — SODIUM CHLORIDE 0.9 % (FLUSH) 0.9 %
10-40 SYRINGE (ML) INJECTION AS NEEDED
Status: DISCONTINUED | OUTPATIENT
Start: 2017-03-23 | End: 2017-03-27 | Stop reason: HOSPADM

## 2017-03-23 RX ADMIN — FERRIC CARBOXYMALTOSE INJECTION 750 MG: 50 INJECTION, SOLUTION INTRAVENOUS at 13:55

## 2017-03-23 RX ADMIN — Medication 10 ML: at 14:49

## 2017-03-23 RX ADMIN — SODIUM CHLORIDE 25 ML/HR: 900 INJECTION, SOLUTION INTRAVENOUS at 13:55

## 2017-03-23 RX ADMIN — Medication 10 ML: at 13:40

## 2017-03-23 NOTE — PROGRESS NOTES
[facility] Progress Note    Date: 2017    Name: Cayden Salazar    MRN: [id number]         : 1995    Andrew Lock #1/2    Ms. Abiola Fam was assessed and education was provided. Pt arrived ambulatory for scheduled Injectafer accompanied by mom. Admission history database completed. Ms. Shari Garza vitals were reviewed and patient was observed for 5 minutes prior to treatment. Visit Vitals    /77    Pulse 89    Temp 98 °F (36.7 °C)    Resp 18    SpO2 100%    Breastfeeding No       # 22 gauge started to right forearm X 1 attempt. Injectafer 750 mg was infused over 20 min. Pt stayed for required 30 min observation post infusion. .    Ms. Abiola Fam tolerated the infusion, and had no complaints. Patient armband removed and shredded. Ms. Abiola Fam was discharged from [place] [address] in stable condition at 1450. She is to return on 2017 at 1300 for her next appointment.     Lizzy Davis RN  2017  4:27 PM

## 2017-03-28 RX ORDER — SODIUM CHLORIDE 9 MG/ML
25 INJECTION, SOLUTION INTRAVENOUS CONTINUOUS
Status: DISPENSED | OUTPATIENT
Start: 2017-03-30 | End: 2017-03-30

## 2017-03-30 ENCOUNTER — HOSPITAL ENCOUNTER (OUTPATIENT)
Dept: INFUSION THERAPY | Age: 22
Discharge: HOME | End: 2017-03-30
Payer: SUBSIDIZED

## 2017-03-30 VITALS
DIASTOLIC BLOOD PRESSURE: 74 MMHG | HEART RATE: 88 BPM | SYSTOLIC BLOOD PRESSURE: 139 MMHG | TEMPERATURE: 97.9 F | RESPIRATION RATE: 18 BRPM | OXYGEN SATURATION: 99 %

## 2017-03-30 PROCEDURE — 96374 THER/PROPH/DIAG INJ IV PUSH: CPT

## 2017-03-30 PROCEDURE — 74011250636 HC RX REV CODE- 250/636: Performed by: INTERNAL MEDICINE

## 2017-03-30 RX ADMIN — FERRIC CARBOXYMALTOSE INJECTION 750 MG: 50 INJECTION, SOLUTION INTRAVENOUS at 13:24

## 2017-03-30 RX ADMIN — SODIUM CHLORIDE 25 ML/HR: 900 INJECTION, SOLUTION INTRAVENOUS at 13:23

## 2017-03-30 NOTE — DISCHARGE INSTRUCTIONS
OUTPATIENT INFUSION CENTER    DISCHARGE INSTRUCTIONS FOR:    IRON INFUSIONS - INCLUDING VENOFER, FERRLECIT, AND INFED    You should continue to take your usual home medications unless otherwise instructed by your physician. Drink plenty of fluids and eat your usual diet. All medications have the potential to cause side effects. Your physician can instruct you regarding any necessary treatment for side effects. Some possible side effects of iron infusions may include the following:     - Urinary changes;   - Mild muscle cramping;   - Mild nausea, stomach pain, diarrhea or constipation;   - Mild skin itching, mild pain at IV site. Signs/Symptoms of an allergic reaction may require immediate medical attention. These may include one or more of the following:       Skin redness, itching, swelling, blistering, weeping, crusting, rash or hives. Wheezing, chest tightness, cough, or shortness of breath;   Swelling of the face, eyelids, lips, tongue, or throat;  Severe headache, seizures or tremor;  Stuffy nose, runny nose, sneezing;   Red (bloodshot), itchy, swollen, or watery eyes;  Stomach  pain, nausea, vomiting, diarrhea or bloody diarrhea; Chest pain or tightness, increased heart rate, palpitations, changes in blood pressure which can cause dizziness, unusual feelings of weakness or fatigue;  Back ache or pain around waist;  Painful urination, increase or decrease in amount of urine, blood in urine. Contact your physicians office with any questions or concerns regarding your treatment.     John Oconnell, Signature: _____________________________________ 3/30/2017  Mervat Moctezuma RN

## 2017-03-30 NOTE — PROGRESS NOTES
OPIC VISIT NOTE:    1300  Pt arrived at [facility] ambulatory and in no distress for Dose 2/2 of Injectafer. Assessment completed, no new complaints voiced. Patient had c/o muscle aches after last infusion. Education provided. Opportunity for questions provided and patient verbalizes understanding. Visit Vitals    /74    Pulse 88    Temp 97.9 °F (36.6 °C)    Resp 18    LMP 03/01/2017    SpO2 99%       Medications received: Injectafer 750mg IV   Patient Vitals for the past 12 hrs:   Temp Pulse Resp BP SpO2   03/30/17 1418 97.9 °F (36.6 °C) 88 18 139/74 -   03/30/17 1351 97.6 °F (36.4 °C) 75 18 137/79 99 %   03/30/17 1323 98.1 °F (36.7 °C) 96 18 135/85 99 %   03/30/17 1318 - - - - 99 %     1425 patient monitored for 30 minutes post infusion, Tolerated treatment well, no adverse reaction noted. Blood return noted pre and post treatment, PIV flushed and removed with tip intact. Written discharge instructions given and patient verbalizes understanding. D/Cd from [facility] ambulatory and in no distress accompanied by mother.   Patient to follow up with MD.

## 2017-03-30 NOTE — PROGRESS NOTES
Problem: Knowledge Deficit  Goal: *Verbalizes understanding of procedures and medications  Outcome: Progressing Towards Goal  Patient present for dose 2/2 of iron and verbalizes understanding.

## 2017-04-13 ENCOUNTER — APPOINTMENT (OUTPATIENT)
Dept: GENERAL RADIOLOGY | Age: 22
End: 2017-04-13
Attending: PHYSICIAN ASSISTANT
Payer: SUBSIDIZED

## 2017-04-13 ENCOUNTER — HOSPITAL ENCOUNTER (EMERGENCY)
Age: 22
Discharge: HOME | End: 2017-04-13
Attending: EMERGENCY MEDICINE | Admitting: EMERGENCY MEDICINE
Payer: SUBSIDIZED

## 2017-04-13 VITALS
HEART RATE: 106 BPM | OXYGEN SATURATION: 100 % | BODY MASS INDEX: 56.57 KG/M2 | RESPIRATION RATE: 18 BRPM | SYSTOLIC BLOOD PRESSURE: 136 MMHG | WEIGHT: 293 LBS | TEMPERATURE: 98.5 F | DIASTOLIC BLOOD PRESSURE: 90 MMHG
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
## 2017-04-13 DIAGNOSIS — M54.6 ACUTE BILATERAL THORACIC BACK PAIN: ICD-10-CM

## 2017-04-13 PROCEDURE — 99282 EMERGENCY DEPT VISIT SF MDM: CPT

## 2017-04-13 PROCEDURE — 72052 X-RAY EXAM NECK SPINE 6/>VWS: CPT

## 2017-04-13 PROCEDURE — 72072 X-RAY EXAM THORAC SPINE 3VWS: CPT

## 2017-04-13 RX ORDER — IBUPROFEN 600 MG/1
600 TABLET ORAL
Status: DISCONTINUED | OUTPATIENT
Start: 2017-04-13 | End: 2017-04-14 | Stop reason: HOSPADM

## 2017-04-13 RX ORDER — IBUPROFEN 600 MG/1
600 TABLET ORAL
Qty: 20 TAB | Refills: 0 | Status: SHIPPED | OUTPATIENT
Start: 2017-04-13 | End: 2017-04-13

## 2017-04-13 RX ORDER — IBUPROFEN 600 MG/1
600 TABLET ORAL
Qty: 20 TAB | Refills: 0 | Status: SHIPPED | OUTPATIENT
Start: 2017-04-13 | End: 2021-08-15 | Stop reason: ALTCHOICE

## 2017-04-13 RX ORDER — CYCLOBENZAPRINE HCL 10 MG
10 TABLET ORAL
Qty: 8 TAB | Refills: 0 | Status: SHIPPED | OUTPATIENT
Start: 2017-04-13 | End: 2017-04-20 | Stop reason: ALTCHOICE

## 2017-04-13 NOTE — LETTER
NOTIFICATION RETURN TO WORK / SCHOOL 
 
4/13/2017 10:00 PM 
 
Ms. Herlinda Wei [address] [id number] To Whom It May Concern: 
 
Herlinda Wei is currently under the care of [facility] DEPT. Please excuse from work 4/13/17. Sincerely, 
 
 
Lesa Preciado
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
## 2017-04-14 NOTE — DISCHARGE INSTRUCTIONS
Back Pain: Care Instructions  Your Care Instructions    Back pain has many possible causes. It is often related to problems with muscles and ligaments of the back. It may also be related to problems with the nerves, discs, or bones of the back. Moving, lifting, standing, sitting, or sleeping in an awkward way can strain the back. Sometimes you don't notice the injury until later. Arthritis is another common cause of back pain. Although it may hurt a lot, back pain usually improves on its own within several weeks. Most people recover in 12 weeks or less. Using good home treatment and being careful not to stress your back can help you feel better sooner. Follow-up care is a key part of your treatment and safety. Be sure to make and go to all appointments, and call your doctor if you are having problems. Its also a good idea to know your test results and keep a list of the medicines you take. How can you care for yourself at home? · Sit or lie in positions that are most comfortable and reduce your pain. Try one of these positions when you lie down:  ¨ Lie on your back with your knees bent and supported by large pillows. ¨ Lie on the floor with your legs on the seat of a sofa or chair. Vearl Prader on your side with your knees and hips bent and a pillow between your legs. ¨ Lie on your stomach if it does not make pain worse. · Do not sit up in bed, and avoid soft couches and twisted positions. Bed rest can help relieve pain at first, but it delays healing. Avoid bed rest after the first day of back pain. · Change positions every 30 minutes. If you must sit for long periods of time, take breaks from sitting. Get up and walk around, or lie in a comfortable position. · Try using a heating pad on a low or medium setting for 15 to 20 minutes every 2 or 3 hours. Try a warm shower in place of one session with the heating pad. · You can also try an ice pack for 10 to 15 minutes every 2 to 3 hours.  Put a thin cloth between the ice pack and your skin. · Take pain medicines exactly as directed. ¨ If the doctor gave you a prescription medicine for pain, take it as prescribed. ¨ If you are not taking a prescription pain medicine, ask your doctor if you can take an over-the-counter medicine. · Take short walks several times a day. You can start with 5 to 10 minutes, 3 or 4 times a day, and work up to longer walks. Walk on level surfaces and avoid hills and stairs until your back is better. · Return to work and other activities as soon as you can. Continued rest without activity is usually not good for your back. · To prevent future back pain, do exercises to stretch and strengthen your back and stomach. Learn how to use good posture, safe lifting techniques, and proper body mechanics. When should you call for help? Call your doctor now or seek immediate medical care if:  · You have new or worsening numbness in your legs. · You have new or worsening weakness in your legs. (This could make it hard to stand up.)  · You lose control of your bladder or bowels. Watch closely for changes in your health, and be sure to contact your doctor if:  · Your pain gets worse. · You are not getting better after 2 weeks. Where can you learn more? Go to http://lois-iris.info/. Enter J437 in the search box to learn more about \"Back Pain: Care Instructions. \"  Current as of: May 23, 2016  Content Version: 11.2  © 2657-6554 [employer]. Care instructions adapted under license by [employer] (which disclaims liability or warranty for this information). If you have questions about a medical condition or this instruction, always ask your healthcare professional. [address] any warranty or liability for your use of this information.
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
## 2017-04-20 ENCOUNTER — OFFICE VISIT (OUTPATIENT)
Dept: INTERNAL MEDICINE CLINIC | Age: 22
End: 2017-04-20

## 2017-04-20 VITALS
SYSTOLIC BLOOD PRESSURE: 130 MMHG | WEIGHT: 293 LBS | OXYGEN SATURATION: 99 % | RESPIRATION RATE: 14 BRPM | HEART RATE: 103 BPM | TEMPERATURE: 98.1 F | BODY MASS INDEX: 53.92 KG/M2 | HEIGHT: 62 IN | DIASTOLIC BLOOD PRESSURE: 85 MMHG

## 2017-04-20 DIAGNOSIS — E28.2 PCOS (POLYCYSTIC OVARIAN SYNDROME): ICD-10-CM

## 2017-04-20 DIAGNOSIS — M62.838 MUSCLE SPASM OF RIGHT SHOULDER: Primary | ICD-10-CM

## 2017-04-20 DIAGNOSIS — E88.81 METABOLIC SYNDROME: ICD-10-CM

## 2017-04-20 RX ORDER — METFORMIN HYDROCHLORIDE 1000 MG/1
1000 TABLET ORAL 2 TIMES DAILY WITH MEALS
Qty: 60 TAB | Refills: 4 | Status: SHIPPED | OUTPATIENT
Start: 2017-04-20 | End: 2021-08-15 | Stop reason: ALTCHOICE

## 2017-04-20 RX ORDER — METAXALONE 400 MG/1
400 TABLET ORAL 3 TIMES DAILY
Qty: 15 TAB | Refills: 1 | Status: SHIPPED | OUTPATIENT
Start: 2017-04-20 | End: 2021-02-05

## 2017-04-20 RX ORDER — LANOLIN ALCOHOL/MO/W.PET/CERES
400 CREAM (GRAM) TOPICAL 2 TIMES DAILY
Qty: 60 TAB | Refills: 2 | Status: SHIPPED | OUTPATIENT
Start: 2017-04-20 | End: 2021-02-05

## 2017-04-20 NOTE — LETTER
NOTIFICATION RETURN TO WORK / SCHOOL 
 
4/20/2017 9:36 AM 
 
Ms. Oziel Myers [address] [employer] [facility] [address] [id number] To Whom It May Concern: 
 
Oziel Myers is currently under the care of Danielle Siegel 04/20/2017. She will return to work/school on: 04/21/2017. If there are questions or concerns please have the patient contact our office.  
 
 
 
Sincerely, 
 
 
Tyrell Roth MD
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
## 2017-04-20 NOTE — MR AVS SNAPSHOT
Visit Information Date & Time Provider Department Dept. Phone Encounter #  
 4/20/2017  8:45 AM Haroldo Patel MD [facility] and [address] [place] [address] [phone] [id number] Your Appointments 7/20/2017  9:45 AM  
6 MONTH with Carlos Calderon MD  
[facility] ([address]) Appt Note: 6 month follow up [place] [address] [facility] [address]  
[phone]  
  
   
 [place] [address] Upcoming Health Maintenance Date Due Hepatitis A Peds Age 1-18 (1 of 2 - Standard Series) 12/6/1997 HPV AGE 9Y-26Y (1 of 3 - Female 3 Dose Series) 12/6/2007 Pneumococcal 19-64 Highest Risk (1 of 3 - PCV13) 12/6/2015 DTaP/Tdap/Td series (2 - Td) 5/18/2017 Allergies as of 4/20/2017  Review Complete On: 4/20/2017 By: Clarence Lujan Severity Noted Reaction Type Reactions Other Plant, Animal, Environmental  04/28/2015    Runny Nose Seasonal allergies Current Immunizations  Reviewed on 3/30/2017 No immunizations on file. Not reviewed this visit You Were Diagnosed With   
  
 Codes Comments Muscle spasm of right shoulder    -  Primary ICD-10-CM: A57.097 ICD-9-CM: 728.85   
 PCOS (polycystic ovarian syndrome)     ICD-10-CM: E28.2 ICD-9-CM: 256.4 Metabolic syndrome     YWX-30-ZE: G64.15 ICD-9-CM: 277.7 Vitals BP Pulse Temp Resp Height(growth percentile) Weight(growth percentile) 130/85 (!) 103 98.1 °F (36.7 °C) 14 5' 2\" (1.575 m) 310 lb (140.6 kg) LMP SpO2 BMI OB Status Smoking Status 04/05/2017 99% 56.7 kg/m2 Polycystic Ovarian Syndrome Never Smoker Vitals History BMI and BSA Data Body Mass Index Body Surface Area 56.7 kg/m 2 2.48 m 2 Preferred Pharmacy Pharmacy Name Phone [id number] [phone] Your Updated Medication List  
  
   
 This list is accurate as of: 4/20/17  9:36 AM.  Always use your most recent med list.  
  
  
  
  
 albuterol 90 mcg/actuation inhaler Commonly known as:  PROVENTIL HFA, VENTOLIN HFA, PROAIR HFA Take 2 Puffs by inhalation every four (4) hours as needed for Wheezing or Shortness of Breath. EPINEPHrine 0.3 mg/0.3 mL injection Commonly known as:  EPIPEN  
0.3 mL by IntraMUSCular route once as needed for up to 1 dose.  
  
 famotidine 20 mg tablet Commonly known as:  PEPCID Take 40 mg by mouth two (2) times a day. ibuprofen 600 mg tablet Commonly known as:  MOTRIN Take 1 Tab by mouth every six (6) hours as needed for Pain.  
  
 loratadine 10 mg tablet Commonly known as:  Eston Deacon Take 1 Tab by mouth daily. Indications: hives, swelling  
  
 magnesium oxide 400 mg tablet Commonly known as:  MAG-OX Take 1 Tab by mouth two (2) times a day. metaxalone 400 mg tablet Commonly known as:  SKELAXIN Take 1 Tab by mouth three (3) times daily. Indications: MUSCLE SPASM  
  
 metFORMIN 1,000 mg tablet Commonly known as:  GLUCOPHAGE Take 1 Tab by mouth two (2) times daily (with meals). norgestimate-ethinyl estradiol 0.25-35 mg-mcg Tab Commonly known as:  Tahir Boor Take 1 Tab by mouth daily. SKIP THE PLACEBO PILLS Prescriptions Sent to Pharmacy Refills  
 metFORMIN (GLUCOPHAGE) 1,000 mg tablet 4 Sig: Take 1 Tab by mouth two (2) times daily (with meals). Class: Normal  
 Pharmacy: [address] Ph #: [phone] Route: Oral  
 metaxalone (SKELAXIN) 400 mg tablet 1 Sig: Take 1 Tab by mouth three (3) times daily. Indications: MUSCLE SPASM Class: Normal  
 Pharmacy: [address] [place] [address] Ph #: [phone] Route: Oral  
 magnesium oxide (MAG-OX) 400 mg tablet 2 Sig: Take 1 Tab by mouth two (2) times a day.   
 Class: Normal  
 Pharmacy: [id number] [place]  #: [phone] Route: Oral  
  
Introducing [facility]! Marija Odom introduces [employer] patient portal. Now you can access parts of your medical record, email your doctor's office, and request medication refills online. 1. In your internet browser, go to [email or web address] 2. Click on the First Time User? Click Here link in the Sign In box. You will see the New Member Sign Up page. 3. Enter your [employer] Access Code exactly as it appears below. You will not need to use this code after youve completed the sign-up process. If you do not sign up before the expiration date, you must request a new code. · [employer] Access Code: [email or web address] Expires: 7/18/2017  8:22 PM 
 
4. Enter the last four digits of your Social Security Number (xxxx) and Date of Birth (mm/dd/yyyy) as indicated and click Submit. You will be taken to the next sign-up page. 5. Create a [employer] ID. This will be your [employer] login ID and cannot be changed, so think of one that is secure and easy to remember. 6. Create a [employer] password. You can change your password at any time. 7. Enter your Password Reset Question and Answer. This can be used at a later time if you forget your password. 8. Enter your e-mail address. You will receive e-mail notification when new information is available in [address] 9. Click Sign Up. You can now view and download portions of your medical record. 10. Click the Download Summary menu link to download a portable copy of your medical information. If you have questions, please visit the Frequently Asked Questions section of the [employer] website. Remember, [employer] is NOT to be used for urgent needs. For medical emergencies, dial 911. Now available from your iPhone and Android! Please provide this summary of care documentation to your next provider. Your primary care clinician is listed as Cassi Vasquez. If you have any questions after today's visit, please call [phone].

## 2017-05-02 ENCOUNTER — DOCUMENTATION ONLY (OUTPATIENT)
Dept: INTERNAL MEDICINE CLINIC | Age: 22
End: 2017-05-02

## 2018-01-11 ENCOUNTER — OFFICE VISIT (OUTPATIENT)
Dept: ONCOLOGY | Age: 23
End: 2018-01-11

## 2018-01-11 ENCOUNTER — HOSPITAL ENCOUNTER (OUTPATIENT)
Dept: INFUSION THERAPY | Age: 23
Discharge: HOME | End: 2018-01-11
Payer: SUBSIDIZED

## 2018-01-11 VITALS
BODY MASS INDEX: 53.92 KG/M2 | OXYGEN SATURATION: 98 % | RESPIRATION RATE: 16 BRPM | WEIGHT: 293 LBS | HEART RATE: 82 BPM | DIASTOLIC BLOOD PRESSURE: 66 MMHG | TEMPERATURE: 99.4 F | SYSTOLIC BLOOD PRESSURE: 128 MMHG | HEIGHT: 62 IN

## 2018-01-11 VITALS
RESPIRATION RATE: 16 BRPM | TEMPERATURE: 99.4 F | HEART RATE: 82 BPM | SYSTOLIC BLOOD PRESSURE: 128 MMHG | DIASTOLIC BLOOD PRESSURE: 66 MMHG

## 2018-01-11 DIAGNOSIS — D50.9 IRON DEFICIENCY ANEMIA, UNSPECIFIED IRON DEFICIENCY ANEMIA TYPE: Primary | ICD-10-CM

## 2018-01-11 LAB
BASOPHILS # BLD: 0 K/UL (ref 0–0.1)
BASOPHILS NFR BLD: 0 % (ref 0–1)
EOSINOPHIL # BLD: 0.1 K/UL (ref 0–0.4)
EOSINOPHIL NFR BLD: 1 % (ref 0–7)
ERYTHROCYTE [DISTWIDTH] IN BLOOD BY AUTOMATED COUNT: 15.5 % (ref 11.5–14.5)
FERRITIN SERPL-MCNC: 230 NG/ML (ref 8–252)
HCT VFR BLD AUTO: 40.1 % (ref 35–47)
HGB BLD-MCNC: 11.8 G/DL (ref 11.5–16)
IRON SATN MFR SERPL: 15 % (ref 20–50)
IRON SERPL-MCNC: 42 UG/DL (ref 35–150)
LYMPHOCYTES # BLD: 3.5 K/UL (ref 0.8–3.5)
LYMPHOCYTES NFR BLD: 44 % (ref 12–49)
MCH RBC QN AUTO: 23.7 PG (ref 26–34)
MCHC RBC AUTO-ENTMCNC: 29.4 G/DL (ref 30–36.5)
MCV RBC AUTO: 80.5 FL (ref 80–99)
MONOCYTES # BLD: 0.5 K/UL (ref 0–1)
MONOCYTES NFR BLD: 6 % (ref 5–13)
NEUTS SEG # BLD: 3.9 K/UL (ref 1.8–8)
NEUTS SEG NFR BLD: 49 % (ref 32–75)
PLATELET # BLD AUTO: 299 K/UL (ref 150–400)
RBC # BLD AUTO: 4.98 M/UL (ref 3.8–5.2)
TIBC SERPL-MCNC: 271 UG/DL (ref 250–450)
WBC # BLD AUTO: 8 K/UL (ref 3.6–11)

## 2018-01-11 PROCEDURE — 36415 COLL VENOUS BLD VENIPUNCTURE: CPT | Performed by: NURSE PRACTITIONER

## 2018-01-11 PROCEDURE — 83540 ASSAY OF IRON: CPT | Performed by: NURSE PRACTITIONER

## 2018-01-11 PROCEDURE — 85025 COMPLETE CBC W/AUTO DIFF WBC: CPT | Performed by: NURSE PRACTITIONER

## 2018-01-11 PROCEDURE — 82728 ASSAY OF FERRITIN: CPT | Performed by: NURSE PRACTITIONER

## 2018-01-11 NOTE — PROGRESS NOTES
Manish Qiu is a 25 y.o. female here today for leukocytosis f/u. IV iron on 3/16 & 3/30/2017. VS stable. Patient denies pain. Patient states she has had her menstrual cycle for over 2 weeks. Blood pressure 128/66, pulse 82, temperature 99.4 °F (37.4 °C), temperature source Oral, resp. rate 16, height 5' 2\" (1.575 m), weight 309 lb 6.4 oz (140.3 kg), last menstrual period 01/02/2018, SpO2 98 %.

## 2018-01-11 NOTE — PROGRESS NOTES
[facility] Short Visit Note:    1150:  Pt arrived ambulatory and in no distress as an add on from Dr. Raquel Gonzalez office for labs. Labs drawn peripherally from the left [facility], patient tolerated well. PLEASE CHECK CONNECT CARE FOR LAB RESULTS. D/Cd from [facility] ambulatory and in no distress.   Patient to follow up with referring MD.

## 2018-01-11 NOTE — PROGRESS NOTES
Hematology Follow Up        Patient: Thao Womack MRN: [id number]  SSN: [id number]    YOB: 1995  Age: 25 y.o. Sex: female      Subjective:      Thao Womack is a 25 y.o. female who I am seeing for iron deficiency anemia. She is asymptomatic. She received IV iron in March 2017. She denies fatigue. Review of Systems:    Constitutional: negative  Eyes: negative  Ears, Nose, Mouth, Throat, and Face: negative  Respiratory: negative  Cardiovascular: negative  Gastrointestinal: negative  Genitourinary:negative  Integument/Breast: negative  Hematologic/Lymphatic: negative  Musculoskeletal:negative  Neurological: negative      Past Medical History:   Diagnosis Date    Asthma     BMI 50.0-59.9, adult ([address] 2/2/2017    Elevated blood sugar level     PCOS (polycystic ovarian syndrome)      Past Surgical History:   Procedure Laterality Date    EXCIS TENDON SHEATH LESN,WRIST/FORE      HX TONSILLECTOMY        Family History   Problem Relation Age of Onset    Other Mother      high cholestrol, fibromyalia, lupus    Diabetes Mother     Hypertension [facility] Elevated Lipids Mother     Other Maternal Grandmother      high blood presssure, heart disease, diabetes    Diabetes Maternal Grandmother     Heart Disease Maternal Grandmother     Hypertension Maternal Grandmother     Elevated Lipids Maternal Grandmother     Heart Attack Maternal Grandfather      Social History   Substance Use Topics    Smoking status: Never Smoker    Smokeless tobacco: Never Used    Alcohol use No      Prior to Admission medications    Medication Sig Start Date End Date Taking? Authorizing Provider   metFORMIN (GLUCOPHAGE) 1,000 mg tablet Take 1 Tab by mouth two (2) times daily (with meals). 4/20/17  Yes Tyrell Roth MD   ibuprofen (MOTRIN) 600 mg tablet Take 1 Tab by mouth every six (6) hours as needed for Pain.  4/13/17  Yes MICHEAL Pinto   famotidine (PEPCID) 20 mg tablet Take 40 mg by mouth as needed. Yes Historical Provider   loratadine (CLARITIN) 10 mg tablet Take 1 Tab by mouth daily. Indications: hives, swelling 2/23/17  Yes Tyrell Roth MD   norgestimate-ethinyl estradiol (ORTHO-CYCLEN, [facility]) 0.25-35 mg-mcg tab Take 1 Tab by mouth daily. SKIP THE PLACEBO PILLS 2/20/17  Yes Ebonie Munroe MD   EPINEPHrine (EPIPEN) 0.3 mg/0.3 mL injection 0.3 mL by IntraMUSCular route once as needed for up to 1 dose. 2/3/17  Yes Vasu Landis MD   albuterol (PROVENTIL HFA, VENTOLIN HFA, PROAIR HFA) 90 mcg/actuation inhaler Take 2 Puffs by inhalation every four (4) hours as needed for Wheezing or Shortness of Breath. 5/30/15  Yes MICHEAL Bower   metaxalone (SKELAXIN) 400 mg tablet Take 1 Tab by mouth three (3) times daily. Indications: MUSCLE SPASM 4/20/17   Tyrell Roth MD   magnesium oxide (MAG-OX) 400 mg tablet Take 1 Tab by mouth two (2) times a day. 4/20/17   Tyrell Roth MD              Allergies   Allergen Reactions    Other Plant, Animal, Environmental Runny Nose     Seasonal allergies           Objective:     Vitals:    01/11/18 1120   BP: 128/66   Pulse: 82   Resp: 16   Temp: 99.4 °F (37.4 °C)   TempSrc: Oral   SpO2: 98%   Weight: 309 lb 6.4 oz (140.3 kg)   Height: 5' 2\" (1.575 m)            Physical Exam:    GENERAL: alert, cooperative  EYE: negative  LYMPHATIC: Cervical, supraclavicular, and axillary nodes normal.   THROAT & NECK: normal and no erythema or exudates noted. LUNG: clear to auscultation bilaterally  HEART: regular rate and rhythm  ABDOMEN: soft, non-tender  EXTREMITIES:  no edema  SKIN: Normal.  NEUROLOGIC: negative        Lab Results   Component Value Date/Time    WBC 8.0 01/11/2018 11:52 AM    HGB 11.8 01/11/2018 11:52 AM    HCT 40.1 01/11/2018 11:52 AM    PLATELET 886 01/06/7099 11:52 AM    MCV 80.5 01/11/2018 11:52 AM        Lab Results   Component Value Date/Time    Ferritin 230 01/11/2018 11:52 AM           Assessment:     1.  Anemia    Iron deficiency  Received IV iron in march 2017   I will repeat laboratory studies      Plan:        1. Repeat CBC with diff, serum ferritin  2. Return in 6 months         Signed by: Eladio Watts MD                     January 11, 2018         CC.  Renee Naylor MD

## 2018-02-26 RX ORDER — NORGESTIMATE AND ETHINYL ESTRADIOL 0.25-0.035
1 KIT ORAL DAILY
OUTPATIENT
Start: 2018-02-26

## 2018-10-25 ENCOUNTER — OFFICE VISIT (OUTPATIENT)
Dept: ONCOLOGY | Age: 23
End: 2018-10-25

## 2018-10-25 VITALS
TEMPERATURE: 98.4 F | HEART RATE: 66 BPM | OXYGEN SATURATION: 99 % | DIASTOLIC BLOOD PRESSURE: 73 MMHG | BODY MASS INDEX: 53.92 KG/M2 | HEIGHT: 62 IN | WEIGHT: 293 LBS | SYSTOLIC BLOOD PRESSURE: 119 MMHG | RESPIRATION RATE: 16 BRPM

## 2018-10-25 DIAGNOSIS — D50.9 IRON DEFICIENCY ANEMIA, UNSPECIFIED IRON DEFICIENCY ANEMIA TYPE: Primary | ICD-10-CM

## 2018-10-25 DIAGNOSIS — D75.839 THROMBOCYTOSIS: ICD-10-CM

## 2018-10-25 NOTE — PROGRESS NOTES
Maverick Chapa is a 25 y.o. female here today for MELYSSA, Thrombocytosis and leukocytosis f/u. VS stable. Patient denies pain. Patient report she stopped taking her birth control medications. Visit Vitals /73 (BP 1 Location: Left arm, BP Patient Position: Sitting) Pulse 66 Temp 98.4 °F (36.9 °C) (Oral) Resp 16 Ht 5' 2\" (1.575 m) Wt 303 lb 6.4 oz (137.6 kg) LMP 08/25/2018 SpO2 99% BMI 55.49 kg/m²

## 2018-10-25 NOTE — PROGRESS NOTES
[address] [facility] [place] [address], [facility], [address] 
[phone] Hematology Follow Up Patient: Brian Davis MRN: [id number]  SSN: [id number] YOB: 1995  Age: 25 y.o. Sex: female Subjective:  
  
Alvaro Dang is a 25 y.o. female who I am seeing for iron deficiency anemia. She is asymptomatic. She received IV iron in March 2017. She denies fatigue or any other iron deficiency symptoms. Review of Systems: 
 
Constitutional: negative Eyes: negative Ears, Nose, Mouth, Throat, and Face: negative Respiratory: negative Cardiovascular: negative Gastrointestinal: negative Genitourinary:negative Integument/Breast: negative Hematologic/Lymphatic: negative Musculoskeletal:negative Neurological: negative Past Medical History:  
Diagnosis Date  Asthma  BMI 50.0-59.9, adult ([facility] [address] 2/2/2017  Elevated blood sugar level  PCOS (polycystic ovarian syndrome) Past Surgical History:  
Procedure Laterality Date  EXCIS TENDON SHEATH LESN,WRIST/FORE    
 HX TONSILLECTOMY Family History Problem Relation Age of Onset  Other Mother   
     high cholestrol, fibromyalia, lupus  Diabetes Mother  Hypertension Mother  Elevated Lipids Mother  Other Maternal Grandmother   
     high blood presssure, heart disease, diabetes  Diabetes Maternal Grandmother  Heart Disease Maternal Grandmother  Hypertension Maternal Grandmother  Elevated Lipids Maternal Grandmother  Heart Attack Maternal Grandfather Social History Tobacco Use  Smoking status: Never Smoker  Smokeless tobacco: Never Used Substance Use Topics  Alcohol use: No  
  
Prior to Admission medications Medication Sig Start Date End Date Taking?  Authorizing Provider  
metFORMIN (GLUCOPHAGE) 1,000 mg tablet Take 1 Tab by mouth two (2) times daily (with meals). 4/20/17  Yes Eva Dasilva MD  
ibuprofen (MOTRIN) 600 mg tablet Take 1 Tab by mouth every six (6) hours as needed for Pain. 4/13/17  Yes Lelo Isaac PA  
loratadine (CLARITIN) 10 mg tablet Take 1 Tab by mouth daily. Indications: hives, swelling 2/23/17  Yes Eva Dasilva MD  
EPINEPHrine (EPIPEN) 0.3 mg/0.3 mL injection 0.3 mL by IntraMUSCular route once as needed for up to 1 dose. 2/3/17  Yes Dion Goyal MD  
albuterol (PROVENTIL HFA, VENTOLIN HFA, PROAIR HFA) 90 mcg/actuation inhaler Take 2 Puffs by inhalation every four (4) hours as needed for Wheezing or Shortness of Breath. 5/30/15  Yes Lelo Isaac PA  
metaxalone (SKELAXIN) 400 mg tablet Take 1 Tab by mouth three (3) times daily. Indications: MUSCLE SPASM 4/20/17   Eva Dasilva MD  
magnesium oxide (MAG-OX) 400 mg tablet Take 1 Tab by mouth two (2) times a day. 4/20/17   Denita Jarrell MD  
famotidine (PEPCID) 20 mg tablet Take 40 mg by mouth as needed. Provider, Historical  
norgestimate-ethinyl estradiol (ORTHO-CYCLEN, SPRINTEC) 0.25-35 mg-mcg tab Take 1 Tab by mouth daily. SKIP THE PLACEBO PILLS 2/20/17   Ervin James MD  
  
 
 
 
 
Allergies Allergen Reactions  Other Plant, Animal, Environmental Runny Nose Seasonal allergies Objective:  
 
Vitals:  
 10/25/18 1042 BP: 119/73 Pulse: 66 Resp: 16 Temp: 98.4 °F (36.9 °C) TempSrc: Oral  
SpO2: 99% Weight: 303 lb 6.4 oz (137.6 kg) Height: 5' 2\" (1.575 m) Physical Exam: 
 
GENERAL: alert, cooperative EYE: negative LYMPHATIC: Cervical, supraclavicular, and axillary nodes normal.  
THROAT & NECK: normal and no erythema or exudates noted. LUNG: clear to auscultation bilaterally HEART: regular rate and rhythm ABDOMEN: soft, non-tender EXTREMITIES:  no edema SKIN: Normal. 
NEUROLOGIC: negative Lab Results Component Value Date/Time  WBC 8.0 01/11/2018 11:52 AM  
 HGB 11.8 01/11/2018 11:52 AM  
 HCT 40.1 01/11/2018 11:52 AM  
 PLATELET 756 25/68/9834 11:52 AM  
 MCV 80.5 01/11/2018 11:52 AM  
 
  
Lab Results Component Value Date/Time Ferritin 230 01/11/2018 11:52 AM  
 
 
 
 
Assessment: 1. Anemia Iron deficiency Received IV iron in march 2017 I will repeat laboratory studies today Plan: 1. Repeat CBC with diff, serum ferritin today 2. Return in 6 months Signed by: Neli Mcginnis MD 
                   October 25, 2018 
 
 
CC.  Shellie Lynch MD

## 2018-10-31 ENCOUNTER — OFFICE VISIT (OUTPATIENT)
Dept: OBGYN CLINIC | Age: 23
End: 2018-10-31

## 2018-10-31 VITALS
SYSTOLIC BLOOD PRESSURE: 122 MMHG | HEIGHT: 62 IN | DIASTOLIC BLOOD PRESSURE: 76 MMHG | WEIGHT: 293 LBS | BODY MASS INDEX: 53.92 KG/M2

## 2018-10-31 DIAGNOSIS — Z11.3 SCREENING EXAMINATION FOR VENEREAL DISEASE: ICD-10-CM

## 2018-10-31 DIAGNOSIS — Z01.419 WELL WOMAN EXAM: ICD-10-CM

## 2018-10-31 DIAGNOSIS — E28.2 PCOS (POLYCYSTIC OVARIAN SYNDROME): ICD-10-CM

## 2018-10-31 DIAGNOSIS — N92.6 IRREGULAR MENSES: ICD-10-CM

## 2018-10-31 DIAGNOSIS — R10.2 PELVIC PAIN: ICD-10-CM

## 2018-10-31 LAB
BILIRUB UR QL STRIP: NEGATIVE
GLUCOSE UR-MCNC: NEGATIVE MG/DL
HCG URINE, QL. (POC): NEGATIVE
KETONES P FAST UR STRIP-MCNC: NEGATIVE MG/DL
PH UR STRIP: 5 [PH] (ref 4.6–8)
PROT UR QL STRIP: NEGATIVE
SP GR UR STRIP: 1.01 (ref 1–1.03)
UA UROBILINOGEN AMB POC: NORMAL (ref 0.2–1)
URINALYSIS CLARITY POC: NORMAL
URINALYSIS COLOR POC: NORMAL
URINE BLOOD POC: NEGATIVE
URINE LEUKOCYTES POC: NEGATIVE
URINE NITRITES POC: NEGATIVE
VALID INTERNAL CONTROL?: YES

## 2018-10-31 NOTE — PROGRESS NOTES
Annual exam 
 
Maverick Chapa is a 25 y.o. morbidly obese (BMI 54) G0 with PMH of PCOS now presenting for annual exam. Her main concerns today include pelvic pain since last year that is worsening, both with menses and between menses. Also with associated dyspareunia. H/o PCOS with irregular cycles, and prior provider thought she might have endometriosis. Stopped ocp's in June and had menses in July and August, none since. Would like to restart ocp's to regulate her menses. OCPs also help with acne and unwanted hair growth. Accepts STD testing today. Works at [employer] at [employer]. Ob/Gyn Hx: 
G0  
LMP - August 2018 Menarche- age 16 Menses- regular monthly cycles? no, last 6 days, passage of clots? yes, intermenstrual spotting? no, Number of pads/tampons per day? 4 Contraception- condoms STI- denies ? SA- yes Health maintenance: 
Pap- 2015, normal per patient Gardasil- completed series Past Medical History:  
Diagnosis Date  Asthma  BMI 50.0-59.9, adult ([facility] [address] 2/2/2017  Elevated blood sugar level  PCOS (polycystic ovarian syndrome) Past Surgical History:  
Procedure Laterality Date  EXCIS TENDON SHEATH LESN,WRIST/FORE    
 HX TONSILLECTOMY Family History Problem Relation Age of Onset  Other Mother   
     high cholestrol, fibromyalia, lupus  Diabetes Mother  Hypertension Mother  Elevated Lipids Mother  Other Maternal Grandmother   
     high blood presssure, heart disease, diabetes  Diabetes Maternal Grandmother  Heart Disease Maternal Grandmother  Hypertension Maternal Grandmother  Elevated Lipids Maternal Grandmother  Heart Attack Maternal Grandfather  No Known Problems Father Social History Socioeconomic History  Marital status: SINGLE Spouse name: Not on file  Number of children: Not on file  Years of education: Not on file  Highest education level: Not on file Social Needs  Financial resource strain: Not on file  Food insecurity - worry: Not on file  Food insecurity - inability: Not on file  Transportation needs - medical: Not on file  Transportation needs - non-medical: Not on file Occupational History  Not on file Tobacco Use  Smoking status: Never Smoker  Smokeless tobacco: Never Used Substance and Sexual Activity  Alcohol use: Yes Comment: 1 per day  Drug use: No  
 Sexual activity: Yes  
  Partners: Male Birth control/protection: Condom Other Topics Concern  Not on file Social History Narrative ** Merged History Encounter ** MALIA Del Rosario Interested in N.P.  
 
 
Current Outpatient Medications Medication Sig Dispense Refill  famotidine (PEPCID) 20 mg tablet Take 40 mg by mouth as needed.  loratadine (CLARITIN) 10 mg tablet Take 1 Tab by mouth daily. Indications: hives, swelling 60 Tab 4  
 albuterol (PROVENTIL HFA, VENTOLIN HFA, PROAIR HFA) 90 mcg/actuation inhaler Take 2 Puffs by inhalation every four (4) hours as needed for Wheezing or Shortness of Breath. 1 Inhaler 1  
 metFORMIN (GLUCOPHAGE) 1,000 mg tablet Take 1 Tab by mouth two (2) times daily (with meals). 60 Tab 4  
 metaxalone (SKELAXIN) 400 mg tablet Take 1 Tab by mouth three (3) times daily. Indications: MUSCLE SPASM 15 Tab 1  
 magnesium oxide (MAG-OX) 400 mg tablet Take 1 Tab by mouth two (2) times a day. 60 Tab 2  ibuprofen (MOTRIN) 600 mg tablet Take 1 Tab by mouth every six (6) hours as needed for Pain. 20 Tab 0  
 norgestimate-ethinyl estradiol (ORTHO-CYCLEN, SPRINTEC) 0.25-35 mg-mcg tab Take 1 Tab by mouth daily. SKIP THE PLACEBO PILLS 3 Package 4  EPINEPHrine (EPIPEN) 0.3 mg/0.3 mL injection 0.3 mL by IntraMUSCular route once as needed for up to 1 dose. 2 Syringe 1 Allergies Allergen Reactions  Other Plant, Animal, Environmental Runny Nose Seasonal allergies Review of Systems - History obtained from the patient Constitutional: negative for weight loss, fever, night sweats HEENT: negative for hearing loss, earache, congestion, snoring, sorethroat CV: negative for chest pain, palpitations, edema Resp: negative for cough, shortness of breath, wheezing GI: negative for change in bowel habits, abdominal pain, black or bloody stools : negative for frequency, dysuria, hematuria, vaginal discharge, +pelvic pain, dyspareunia, irregular menses MSK: negative for back pain, joint pain, muscle pain Breast: negative for breast lumps, nipple discharge, galactorrhea Skin :negative for itching, rash, hives, +acne Neuro: negative for dizziness, headache, confusion, weakness Psych: negative for anxiety, depression, change in mood Heme/lymph: negative for bleeding, bruising, pallor Physical Exam 
 
Visit Vitals /76 (BP 1 Location: Left arm, BP Patient Position: Sitting) Ht 5' 2\" (1.575 m) Wt 302 lb 12.8 oz (137.3 kg) BMI 55.38 kg/m² Constitutional 
· Appearance: well-nourished, well developed, alert, in no acute distress, +morbidly obese HENT 
· Head and Face: appears normal 
 
Neck · Inspection/Palpation: normal appearance, no masses or tenderness · Lymph Nodes: no lymphadenopathy present · Thyroid: gland size normal, nontender, no nodules or masses present on palpation Chest 
· Respiratory Effort: non-labored breathing · Auscultation: CTAB, normal breath sounds Cardiovascular · Heart: 
· Auscultation: regular rate and rhythm without murmur · Extremities: no peripheral edema Breasts · Inspection of Breasts: breasts symmetrical, no skin changes, no discharge present, nipple appearance normal, no skin retraction present · Palpation of Breasts and Axillae: no masses present on palpation, no breast tenderness · Axillary Lymph Nodes: no lymphadenopathy present Gastrointestinal 
· Abdominal Examination: abdomen non-tender to palpation, normal bowel sounds, no masses present · Liver and spleen: no hepatomegaly present, spleen not palpable · Hernias: no hernias identified Genitourinary: pt unable to tolerate bimanual or speculum exam, reacted to even slightest manipulation of external genitalia, pt asked us to stop the exam, denies h/o sexual violence/abuse External Genitalia: normal appearance for age, no discharge present, no tenderness present, no inflammatory lesions present, no masses present, no atrophy present Skin · General Inspection: no rash, no lesions identified Neurologic/Psychiatric · Mental Status: · Orientation: grossly oriented to person, place and time · Mood and Affect: mood normal, affect appropriate Assessment/Plan: 
25 y.o. morbidly obese G0 with pmh of PCOS now presenting for annual exam and evaluation of pelvic pain, dyspareunia, and irregular cycles with heavy flow. Health Maintenance: 
-counseled re: diet, exercise, healthy lifestyle, weight loss 
-pap unable to be done today due to inability to tolerate exam, will try again at next visit 
-Gardasil series complete 
-STI testing on urine PCOS with AUB/HMB: 
-menstrual calendar, pad counts, bleeding precautions 
-referral for pelvic US 
-TSH, PRL, PCOS labs 
-reviewed hormonal contraceptive options for cycle control including LARCs, considering mirena IUD, but unable to tolerate pelvic exam tody 
-Rx for Junel OCPs provided tody Pelvic pain: 
-urine dip -bowel regimen 
-pelvic US as above 
-weight loss 
-pain diary 
-STI testing as above RTC: 2 weeks for US and possible pelvic exam and pap if tolerates Darya Meadows MD  10/31/2018  10:58 AM

## 2018-10-31 NOTE — PATIENT INSTRUCTIONS

## 2018-11-01 LAB
BASOPHILS # BLD AUTO: 0 X10E3/UL (ref 0–0.2)
BASOPHILS NFR BLD AUTO: 0 %
EOSINOPHIL # BLD AUTO: 0.1 X10E3/UL (ref 0–0.4)
EOSINOPHIL NFR BLD AUTO: 1 %
ERYTHROCYTE [DISTWIDTH] IN BLOOD BY AUTOMATED COUNT: 14.5 % (ref 12.3–15.4)
FERRITIN SERPL-MCNC: 361 NG/ML (ref 15–150)
HCT VFR BLD AUTO: 39.4 % (ref 34–46.6)
HGB BLD-MCNC: 12.2 G/DL (ref 11.1–15.9)
IMM GRANULOCYTES # BLD: 0 X10E3/UL (ref 0–0.1)
IMM GRANULOCYTES NFR BLD: 0 %
IRON SATN MFR SERPL: 24 % (ref 15–55)
IRON SERPL-MCNC: 60 UG/DL (ref 27–159)
LYMPHOCYTES # BLD AUTO: 2.6 X10E3/UL (ref 0.7–3.1)
LYMPHOCYTES NFR BLD AUTO: 42 %
MCH RBC QN AUTO: 24.4 PG (ref 26.6–33)
MCHC RBC AUTO-ENTMCNC: 31 G/DL (ref 31.5–35.7)
MCV RBC AUTO: 79 FL (ref 79–97)
MONOCYTES # BLD AUTO: 0.6 X10E3/UL (ref 0.1–0.9)
MONOCYTES NFR BLD AUTO: 9 %
NEUTROPHILS # BLD AUTO: 3 X10E3/UL (ref 1.4–7)
NEUTROPHILS NFR BLD AUTO: 48 %
PLATELET # BLD AUTO: 228 X10E3/UL (ref 150–379)
RBC # BLD AUTO: 5 X10E6/UL (ref 3.77–5.28)
TIBC SERPL-MCNC: 252 UG/DL (ref 250–450)
UIBC SERPL-MCNC: 192 UG/DL (ref 131–425)
WBC # BLD AUTO: 6.2 X10E3/UL (ref 3.4–10.8)

## 2018-11-02 LAB
C TRACH RRNA VAG QL NAA+PROBE: NEGATIVE
N GONORRHOEA RRNA VAG QL NAA+PROBE: NEGATIVE
T VAGINALIS RRNA VAG QL NAA+PROBE: POSITIVE

## 2018-11-03 LAB
17OHP SERPL-MCNC: 28 NG/DL
PROLACTIN SERPL-MCNC: 18.2 NG/ML (ref 4.8–23.3)
TESTOST FREE SERPL-MCNC: 3.7 PG/ML (ref 0–4.2)
TESTOST SERPL-MCNC: 43 NG/DL (ref 8–48)
TSH SERPL DL<=0.005 MIU/L-ACNC: 1.39 UIU/ML (ref 0.45–4.5)

## 2018-11-05 ENCOUNTER — TELEPHONE (OUTPATIENT)
Dept: OBGYN CLINIC | Age: 23
End: 2018-11-05

## 2018-11-05 RX ORDER — METRONIDAZOLE 500 MG/1
TABLET ORAL
Qty: 4 TAB | Refills: 0 | Status: SHIPPED | OUTPATIENT
Start: 2018-11-05 | End: 2021-02-05 | Stop reason: ALTCHOICE

## 2018-11-05 RX ORDER — NORETHINDRONE ACETATE AND ETHINYL ESTRADIOL 1MG-20(21)
1 KIT ORAL DAILY
Qty: 3 PACKAGE | Refills: 4 | Status: SHIPPED | OUTPATIENT
Start: 2018-11-05 | End: 2018-12-04 | Stop reason: ALTCHOICE

## 2018-11-05 NOTE — TELEPHONE ENCOUNTER
Patient recently saw SP on 10/31/18 and was supposed  to have 2 RX's sent to her pharmacy. She was supposed to have:    1)  Flagyl-  Patient was aware of all results, just calling to notify RX not sent x2   Notes recorded by Conner Reeder MD on 11/2/2018 at 12:24 PM EDT  +Trichomonas. Please notify patient and send Rx for Flagyl 2g PO x 1 dose to pt's pharmacy. Please advise partner treatment, and no intercourse until 2 weeks after all partners treated. Please review safe sexual practices. Thanks! 2)  Junel birthcontrol     Stopped ocp's in June and had menses in July and August, none since. Would like to restart ocp's to regulate her menses. OCPs also help with acne and unwanted hair growth. Accepts STD testing today. Neither of the RX's have been sent in and Rx for flagyl discussed not drinking alcohol for duration of treatment and one day after.       [facility]   [address]     Last AE 10/31/18 with sp

## 2018-12-04 ENCOUNTER — OFFICE VISIT (OUTPATIENT)
Dept: OBGYN CLINIC | Age: 23
End: 2018-12-04

## 2018-12-04 VITALS
WEIGHT: 293 LBS | BODY MASS INDEX: 53.92 KG/M2 | HEIGHT: 62 IN | DIASTOLIC BLOOD PRESSURE: 78 MMHG | SYSTOLIC BLOOD PRESSURE: 122 MMHG

## 2018-12-04 DIAGNOSIS — A59.9 TRICHOMONAS INFECTION: ICD-10-CM

## 2018-12-04 DIAGNOSIS — N93.8 DUB (DYSFUNCTIONAL UTERINE BLEEDING): ICD-10-CM

## 2018-12-04 DIAGNOSIS — R10.2 PELVIC PAIN: Primary | ICD-10-CM

## 2018-12-04 DIAGNOSIS — E66.01 MORBID OBESITY (HCC): ICD-10-CM

## 2018-12-04 NOTE — PROGRESS NOTES
Follow up exam 
 
Rita Brian is a 25 y.o. morbidly obese (BMI 54) G0 with PMH of PCOS now presenting for ultrasound and follow up of pelvic pain (both with menses and between menses). Reports pain improved since restarting Junel OCPs at last visit, but would like to transition to Lo Loestrin if possible. Still some dyspareunia. H/o PCOS with irregular cycles, however ultrasound today showing normal ovaries and PCOS labs wnl. 
 
+trich at recent visit --> both she and her partner have been treated. Works at [employer] at [employer]. TVUS 12/4/18: 
THE UTERUS IS ANTEVERTED, NORMAL IN SIZE AND ECHOGENICITY. THE ENDOMETRIUM MEASURES 5MM IN THICKNESS. NO MASSES OR ABNORMALITIES ARE SEEN. RIGHT OVARY APPEARS WNL. LEFT OVARY APPEARS WNL. NO FREE FLUID IS SEEN IN THE CDS. Ob/Gyn Hx: 
G0  
LMP - 11/25/18 Menarche- age 16 Menses- regular monthly cycles? no, last 6 days, passage of clots? yes, intermenstrual spotting? no, Number of pads/tampons per day? 4 Contraception- condoms, ocp STI- denies ? SA- yes Health maintenance: 
Pap- 2015, normal per patient Gardasil- completed series Past Medical History:  
Diagnosis Date  Asthma  BMI 50.0-59.9, adult ([facility] [address] 2/2/2017  Elevated blood sugar level  PCOS (polycystic ovarian syndrome) Past Surgical History:  
Procedure Laterality Date  EXCIS TENDON SHEATH LESN,WRIST/FORE    
 HX TONSILLECTOMY Family History Problem Relation Age of Onset  Other Mother   
     high cholestrol, fibromyalia, lupus  Diabetes Mother  Hypertension Mother  Elevated Lipids Mother  Other Maternal Grandmother   
     high blood presssure, heart disease, diabetes  Diabetes Maternal Grandmother  Heart Disease Maternal Grandmother  Hypertension Maternal Grandmother  Elevated Lipids Maternal Grandmother  Heart Attack Maternal Grandfather  No Known Problems Father Social History Socioeconomic History  Marital status: SINGLE Spouse name: Not on file  Number of children: Not on file  Years of education: Not on file  Highest education level: Not on file Social Needs  Financial resource strain: Not on file  Food insecurity - worry: Not on file  Food insecurity - inability: Not on file  Transportation needs - medical: Not on file  Transportation needs - non-medical: Not on file Occupational History  Not on file Tobacco Use  Smoking status: Never Smoker  Smokeless tobacco: Never Used Substance and Sexual Activity  Alcohol use: Yes Comment: 1 per day  Drug use: No  
 Sexual activity: Yes  
  Partners: Male Birth control/protection: Condom Other Topics Concern  Not on file Social History Narrative ** Merged History Encounter ** MALIA Del Rosario Interested in [employer]. Works at [employer] 10/2018 Current Outpatient Medications Medication Sig Dispense Refill  norethindrone-ethinyl estradiol (JUNEL FE 1/20) 1 mg-20 mcg (21)/75 mg (7) tab Take 1 Tab by mouth daily. 3 Package 4  
 metroNIDAZOLE (FLAGYL) 500 mg tablet Take all 4 tablets now. 4 Tab 0  
 metFORMIN (GLUCOPHAGE) 1,000 mg tablet Take 1 Tab by mouth two (2) times daily (with meals). 60 Tab 4  
 metaxalone (SKELAXIN) 400 mg tablet Take 1 Tab by mouth three (3) times daily. Indications: MUSCLE SPASM 15 Tab 1  
 magnesium oxide (MAG-OX) 400 mg tablet Take 1 Tab by mouth two (2) times a day. 60 Tab 2  ibuprofen (MOTRIN) 600 mg tablet Take 1 Tab by mouth every six (6) hours as needed for Pain. 20 Tab 0  
 famotidine (PEPCID) 20 mg tablet Take 40 mg by mouth as needed.  loratadine (CLARITIN) 10 mg tablet Take 1 Tab by mouth daily. Indications: hives, swelling 60 Tab 4  
 norgestimate-ethinyl estradiol (ORTHO-CYCLEN, SPRINTEC) 0.25-35 mg-mcg tab Take 1 Tab by mouth daily. SKIP THE PLACEBO PILLS 3 Package 4  EPINEPHrine (EPIPEN) 0.3 mg/0.3 mL injection 0.3 mL by IntraMUSCular route once as needed for up to 1 dose. 2 Syringe 1  
 albuterol (PROVENTIL HFA, VENTOLIN HFA, PROAIR HFA) 90 mcg/actuation inhaler Take 2 Puffs by inhalation every four (4) hours as needed for Wheezing or Shortness of Breath. 1 Inhaler 1 Allergies Allergen Reactions  Other Plant, Animal, Environmental Runny Nose Seasonal allergies Review of Systems - History obtained from the patient Constitutional: negative for weight loss, fever, night sweats HEENT: negative for hearing loss, earache, congestion, snoring, sorethroat CV: negative for chest pain, palpitations, edema Resp: negative for cough, shortness of breath, wheezing GI: negative for change in bowel habits, abdominal pain, black or bloody stools : negative for frequency, dysuria, hematuria, vaginal discharge, +pelvic pain, dyspareunia, irregular menses MSK: negative for back pain, joint pain, muscle pain Breast: negative for breast lumps, nipple discharge, galactorrhea Skin :negative for itching, rash, hives, +acne Neuro: negative for dizziness, headache, confusion, weakness Psych: negative for anxiety, depression, change in mood Heme/lymph: negative for bleeding, bruising, pallor Physical Exam 
Visit Vitals /78 (BP 1 Location: Left arm, BP Patient Position: Sitting) Ht 5' 2\" (1.575 m) Wt 305 lb (138.3 kg) LMP 11/25/2018 BMI 55.79 kg/m² Constitutional 
· Appearance: well-nourished, well developed, alert, in no acute distress, +morbidly obese HENT 
· Head and Face: appears normal 
 
Neck · Inspection/Palpation: normal appearance, no masses or tenderness · Lymph Nodes: no lymphadenopathy present · Thyroid: gland size normal, nontender, no nodules or masses present on palpation Chest 
· Respiratory Effort: non-labored breathing · Auscultation: CTAB, normal breath sounds Cardiovascular · Heart: 
· Auscultation: regular rate and rhythm without murmur · Extremities: no peripheral edema Breasts · Inspection of Breasts: breasts symmetrical, no skin changes, no discharge present, nipple appearance normal, no skin retraction present · Palpation of Breasts and Axillae: no masses present on palpation, no breast tenderness · Axillary Lymph Nodes: no lymphadenopathy present Gastrointestinal 
· Abdominal Examination: abdomen non-tender to palpation, normal bowel sounds, no masses present · Liver and spleen: no hepatomegaly present, spleen not palpable · Hernias: no hernias identified Genitourinary: declined exam today --> at prior visit pt unable to tolerate bimanual or speculum exam, reacted to even slightest manipulation of external genitalia, pt asked us to stop the exam, denied h/o sexual violence/abuse Skin · General Inspection: no rash, no lesions identified Neurologic/Psychiatric · Mental Status: · Orientation: grossly oriented to person, place and time · Mood and Affect: mood normal, affect appropriate Assessment/Plan: 
25 y.o. morbidly obese G0 presenting for follow up of pelvic pain, dyspareunia, and irregular cycles with heavy flow. Decreased pain on OCPs. Health Maintenance: 
-unable to tolerate exam at prior visit, declines exam today, will perform pap at next visit 
-Gardasil series complete Trichomonas: s/p tx with flagyl, partner treated 
-test to r/o reinfection at next visit, declines exam today AUB/HMB: 
-transition to Lo loestrin for cycle control 
-reviewed TVUS findings with patient today (normal pelvic structures) -reviewed lab results from prior visit (TSH, PRL, PCOS labs all normal) Pelvic pain/dyspareunia: 
-urine dip wnl at prior visit -bowel regimen 
-pelvic US wnl as above 
-weight loss 
-pain diary RTC: 2-3 months for f/u of AUB and pelvic pain, needs pap and trich RUKHSANA at that visit Rd Reeves MD  12/4/2018  2:27 PM

## 2018-12-04 NOTE — PATIENT INSTRUCTIONS
Pelvic Pain: Care Instructions Your Care Instructions Pelvic pain, or pain in the lower belly, can have many causes. Often pelvic pain is not serious and gets better in a few days. If your pain continues or gets worse, you may need tests and treatment. Tell your doctor about any new symptoms. These may be signs of a serious problem. Follow-up care is a key part of your treatment and safety. Be sure to make and go to all appointments, and call your doctor if you are having problems. It's also a good idea to know your test results and keep a list of the medicines you take. How can you care for yourself at home? · Rest until you feel better. Lie down, and raise your legs by placing a pillow under your knees. · Drink plenty of fluids. You may find that small, frequent sips are easier on your stomach than if you drink a lot at once. Avoid drinks with carbonation or caffeine, such as soda pop, tea, or coffee. · Try eating several small meals instead of 2 or 3 large ones. Eat mild foods, such as rice, dry toast or crackers, bananas, and applesauce. Avoid fatty and spicy foods, other fruits, and alcohol until 48 hours after your symptoms have gone away. · Take an over-the-counter pain medicine, such as acetaminophen (Tylenol), ibuprofen (Advil, Motrin), or naproxen (Aleve). Read and follow all instructions on the label. · Do not take two or more pain medicines at the same time unless the doctor told you to. Many pain medicines have acetaminophen, which is Tylenol. Too much acetaminophen (Tylenol) can be harmful. · You can put a heating pad, a warm cloth, or moist heat on your belly to relieve pain. When should you call for help? Call your doctor now or seek immediate medical care if: 
  · You have a new or higher fever.  
  · You have unusual vaginal bleeding.  
  · You have new or worse belly or pelvic pain.  
  · You have vaginal discharge that has increased in amount or smells bad.  Watch closely for changes in your health, and be sure to contact your doctor if: 
  · You do not get better as expected. Where can you learn more? Go to http://lois-iris.info/. Enter [phone] in the search box to learn more about \"Pelvic Pain: Care Instructions. \" Current as of: May 15, 2018 Content Version: 11.8 © 9109-8511 [employer]. Care instructions adapted under license by [employer] (which disclaims liability or warranty for this information). If you have questions about a medical condition or this instruction, always ask your healthcare professional. [address] any warranty or liability for your use of this information.

## 2019-03-01 ENCOUNTER — OFFICE VISIT (OUTPATIENT)
Dept: OBGYN CLINIC | Age: 24
End: 2019-03-01

## 2019-03-01 VITALS
BODY MASS INDEX: 53.92 KG/M2 | DIASTOLIC BLOOD PRESSURE: 84 MMHG | WEIGHT: 293 LBS | HEIGHT: 62 IN | SYSTOLIC BLOOD PRESSURE: 126 MMHG

## 2019-03-01 DIAGNOSIS — Z11.3 SCREENING EXAMINATION FOR VENEREAL DISEASE: Primary | ICD-10-CM

## 2019-03-01 DIAGNOSIS — N89.8 VAGINAL DISCHARGE: ICD-10-CM

## 2019-03-01 DIAGNOSIS — Z01.419 WELL WOMAN EXAM: ICD-10-CM

## 2019-03-01 RX ORDER — NORETHINDRONE ACETATE AND ETHINYL ESTRADIOL, ETHINYL ESTRADIOL AND FERROUS FUMARATE 1MG-10(24)
KIT ORAL
Refills: 4 | COMMUNITY
Start: 2019-02-23 | End: 2019-06-07 | Stop reason: ALTCHOICE

## 2019-03-01 NOTE — PROGRESS NOTES
Follow up exam 
 
Geneva Cruz is a 26 y.o. morbidly obese (BMI 55) G0 with PMH of possible PCOS and irregular cycles now presenting for follow up of pelvic pain (both with menses and between menses). Reports pain completely resolved since transitioning to Lo Loestrin OCPs. She is very happy with this method. Still some dyspareunia. Very light menses, last month just with spotting. Unclear diagnosis of PCOS as prior ultrasound showing normal ovaries and PCOS labs wnl. 
 
+trich at recent visit --> both she and her partner have been treated. Works at [employer]. Going to school at [employer], plans to transfer to [employer] next semester. TVUS 12/4/18: 
THE UTERUS IS ANTEVERTED, NORMAL IN SIZE AND ECHOGENICITY. THE ENDOMETRIUM MEASURES 5MM IN THICKNESS. NO MASSES OR ABNORMALITIES ARE SEEN. RIGHT OVARY APPEARS WNL. LEFT OVARY APPEARS WNL. NO FREE FLUID IS SEEN IN THE CDS. Ob/Gyn Hx: 
G0  
LMP - just light spotting with Lo Lo Menarche- age 16 Menses- regular monthly cycles? no, last 6 days, passage of clots? yes, intermenstrual spotting? no, Number of pads/tampons per day? 4 Contraception- condoms, ocp STI- denies ? SA- yes Health maintenance: 
Pap- 2015, normal per patient Gardasil- completed series Past Medical History:  
Diagnosis Date  Asthma  BMI 50.0-59.9, adult ([facility] [address] 2/2/2017  Elevated blood sugar level  PCOS (polycystic ovarian syndrome) Past Surgical History:  
Procedure Laterality Date  EXCIS TENDON SHEATH LESN,WRIST/FORE    
 HX TONSILLECTOMY Family History Problem Relation Age of Onset  Other Mother   
     high cholestrol, fibromyalia, lupus  Diabetes Mother  Hypertension Mother  Elevated Lipids Mother  Other Maternal Grandmother   
     high blood presssure, heart disease, diabetes  Diabetes Maternal Grandmother  Heart Disease Maternal Grandmother  Hypertension Maternal Grandmother  Elevated Lipids Maternal Grandmother  Heart Attack Maternal Grandfather  No Known Problems Father Social History Socioeconomic History  Marital status: SINGLE Spouse name: Not on file  Number of children: Not on file  Years of education: Not on file  Highest education level: Not on file Social Needs  Financial resource strain: Not on file  Food insecurity - worry: Not on file  Food insecurity - inability: Not on file  Transportation needs - medical: Not on file  Transportation needs - non-medical: Not on file Occupational History  Not on file Tobacco Use  Smoking status: Never Smoker  Smokeless tobacco: Never Used Substance and Sexual Activity  Alcohol use: Yes Comment: 1 per day  Drug use: No  
 Sexual activity: Yes  
  Partners: Male Birth control/protection: Condom, Pill Other Topics Concern  Not on file Social History Narrative ** Merged History Encounter ** MALIA Del Rosario Interested in [employer] Works at [employer] 10/2018 Current Outpatient Medications Medication Sig Dispense Refill  LO LOESTRIN FE 1 mg-10 mcg (24)/10 mcg (2) tab TAKE 1 TABLET BY MOUTH EVERY DAY  4  
 metroNIDAZOLE (FLAGYL) 500 mg tablet Take all 4 tablets now. 4 Tab 0  
 metFORMIN (GLUCOPHAGE) 1,000 mg tablet Take 1 Tab by mouth two (2) times daily (with meals). 60 Tab 4  
 metaxalone (SKELAXIN) 400 mg tablet Take 1 Tab by mouth three (3) times daily. Indications: MUSCLE SPASM 15 Tab 1  
 magnesium oxide (MAG-OX) 400 mg tablet Take 1 Tab by mouth two (2) times a day. 60 Tab 2  ibuprofen (MOTRIN) 600 mg tablet Take 1 Tab by mouth every six (6) hours as needed for Pain. 20 Tab 0  
 famotidine (PEPCID) 20 mg tablet Take 40 mg by mouth as needed.  loratadine (CLARITIN) 10 mg tablet Take 1 Tab by mouth daily.  Indications: hives, swelling 60 Tab 4  
 norgestimate-ethinyl estradiol (Arvell Aloe) 0.25-35 mg-mcg tab Take 1 Tab by mouth daily. SKIP THE PLACEBO PILLS 3 Package 4  EPINEPHrine (EPIPEN) 0.3 mg/0.3 mL injection 0.3 mL by IntraMUSCular route once as needed for up to 1 dose. 2 Syringe 1  
 albuterol (PROVENTIL HFA, VENTOLIN HFA, PROAIR HFA) 90 mcg/actuation inhaler Take 2 Puffs by inhalation every four (4) hours as needed for Wheezing or Shortness of Breath. 1 Inhaler 1 Allergies Allergen Reactions  Other Plant, Animal, Environmental Runny Nose Seasonal allergies Review of Systems - History obtained from the patient Constitutional: negative for weight loss, fever, night sweats HEENT: negative for hearing loss, earache, congestion, snoring, sorethroat CV: negative for chest pain, palpitations, edema Resp: negative for cough, shortness of breath, wheezing GI: negative for change in bowel habits, abdominal pain, black or bloody stools : negative for frequency, dysuria, hematuria, vaginal discharge, +pelvic pain, dyspareunia, irregular menses --> improved! MSK: negative for back pain, joint pain, muscle pain Breast: negative for breast lumps, nipple discharge, galactorrhea Skin :negative for itching, rash, hives, +acne, mild Neuro: negative for dizziness, headache, confusion, weakness Psych: negative for anxiety, depression, change in mood Heme/lymph: negative for bleeding, bruising, pallor Physical Exam 
Visit Vitals /84 (BP 1 Location: Left arm, BP Patient Position: Sitting) Ht 5' 2\" (1.575 m) Wt 310 lb 3.2 oz (140.7 kg) BMI 56.74 kg/m² PHYSICAL EXAMINATION Constitutional 
· Appearance: well-nourished, well developed, alert, in no acute distress, +morbidly obese HENT 
· Head and Face: appears normal 
 
Neck · Inspection/Palpation: normal appearance, no masses or tenderness · Lymph Nodes: no lymphadenopathy present · Thyroid: gland size normal, nontender, no nodules or masses present on palpation Chest 
· Respiratory Effort: breathing labored · Auscultation: normal breath sounds Cardiovascular · Heart: 
· Auscultation: regular rate and rhythm without murmur Gastrointestinal 
· Abdominal Examination: abdomen non-tender to palpation, normal bowel sounds, no masses present, +obese · Liver and spleen: no hepatomegaly present, spleen not palpable · Hernias: no hernias identified Genitourinary · External Genitalia: normal appearance for age, no discharge present, no tenderness present, no inflammatory lesions present, no masses present, no atrophy present · Vagina: normal vaginal vault without central or paravaginal defects, no inflammatory lesions present, no masses present, +vulvovaginal erythema, and moderate amount of thick white discharge · Bladder: non-tender to palpation · Urethra: appears normal 
· Cervix: normal, no cervicitis or CMT · Uterus: normal size, shape and consistency · Adnexa: no adnexal tenderness present, no adnexal masses appreciated, but exam limited by habitus · Perineum: perineum within normal limits, no evidence of trauma, no rashes or skin lesions present · Anus: anus within normal limits, no hemorrhoids present · Inguinal Lymph Nodes: no lymphadenopathy present Skin · General Inspection: no rash, no lesions identified Neurologic/Psychiatric · Mental Status: · Orientation: grossly oriented to person, place and time · Mood and Affect: mood normal, affect appropriate Assessment/Plan: 
26 y.o. morbidly obese G0 presenting for follow up of pelvic pain, dyspareunia, and AUB/HMB. Decreased pain on OCPs. Recent tx for trichomonas. 
 
-pap/HPV today, as unable to tolerate at prior well-woman visit 
-repeat STI screen today (endocervix) --> s/p tx for trichomonas at prior visit and vaginal erythema and discharge on exam today 
-cont Lo loestrin for cycle control as this is helping significantly with pelvic pain symptoms RTC: 1 year for AE or sooner preneida Hebert MD  3/1/2019  10:35 AM

## 2019-03-04 LAB
CYTOLOGIST CVX/VAG CYTO: NORMAL
CYTOLOGY CVX/VAG DOC THIN PREP: NORMAL
DX ICD CODE: NORMAL
LABCORP, 190119: NORMAL
Lab: NORMAL
OTHER STN SPEC: NORMAL
PATH REPORT.FINAL DX SPEC: NORMAL
STAT OF ADQ CVX/VAG CYTO-IMP: NORMAL

## 2019-03-05 LAB
A VAGINAE DNA VAG QL NAA+PROBE: ABNORMAL SCORE
BVAB2 DNA VAG QL NAA+PROBE: ABNORMAL SCORE
C ALBICANS DNA VAG QL NAA+PROBE: NEGATIVE
C GLABRATA DNA VAG QL NAA+PROBE: NEGATIVE
C TRACH RRNA SPEC QL NAA+PROBE: NEGATIVE
MEGA1 DNA VAG QL NAA+PROBE: ABNORMAL SCORE
N GONORRHOEA RRNA SPEC QL NAA+PROBE: NEGATIVE
T VAGINALIS RRNA SPEC QL NAA+PROBE: NEGATIVE

## 2019-03-05 NOTE — PROGRESS NOTES
+BV. Please inform patient and send Rx for flagyl 500mg BID x7 days to patient's pharmacy. Please advise no ETOH with this medication. Thank you.

## 2019-03-19 RX ORDER — METRONIDAZOLE 500 MG/1
500 TABLET ORAL 2 TIMES DAILY
Qty: 14 TAB | Refills: 0 | Status: SHIPPED | OUTPATIENT
Start: 2019-03-19 | End: 2019-03-26

## 2019-06-07 ENCOUNTER — OFFICE VISIT (OUTPATIENT)
Dept: OBGYN CLINIC | Age: 24
End: 2019-06-07

## 2019-06-07 VITALS
WEIGHT: 293 LBS | BODY MASS INDEX: 53.92 KG/M2 | SYSTOLIC BLOOD PRESSURE: 124 MMHG | HEIGHT: 62 IN | DIASTOLIC BLOOD PRESSURE: 86 MMHG

## 2019-06-07 DIAGNOSIS — N89.8 VAGINAL DISCHARGE: Primary | ICD-10-CM

## 2019-06-07 RX ORDER — NORETHINDRONE ACETATE AND ETHINYL ESTRADIOL 1MG-20(21)
1 KIT ORAL DAILY
Qty: 3 DOSE PACK | Refills: 4 | Status: SHIPPED | OUTPATIENT
Start: 2019-06-07 | End: 2019-07-05

## 2019-06-07 NOTE — PROGRESS NOTES
Vaginal Discharge    Simon Small is a 26 y.o. morbidly obese (BMI 62) G0 with PMH of possible PCOS and irregular cycles now presenting for vaginal discharge. Denies odor, itching or irritation. Experiencing frequent spotting on Loestrin. Also with intermittent heart palpitations since April. Unclear diagnosis of PCOS as prior ultrasound showing normal ovaries and PCOS labs wnl.    +trich at prior visit --> both she and her partner were treated. 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 Going to school at [employer], plans to transfer to [employer] next semester. TVUS 12/4/18:  THE UTERUS IS ANTEVERTED, NORMAL IN SIZE AND ECHOGENICITY. THE ENDOMETRIUM MEASURES 5MM IN THICKNESS. NO MASSES OR ABNORMALITIES ARE SEEN. RIGHT OVARY APPEARS WNL. LEFT OVARY APPEARS WNL. NO FREE FLUID IS SEEN IN THE CDS. Ob/Gyn Hx:  G0   LMP - 5/17/19  Menarche- age 18  Menses- regular  Contraception- condoms, ocp  STI- denies  ? SA- not currently    Health maintenance:  Pap- 3/1/19 NILM   Gardasil- completed series    Past Medical History:   Diagnosis Date    Asthma     BMI 50.0-59.9, adult ([facility] [address] 2/2/2017    Elevated blood sugar level     PCOS (polycystic ovarian syndrome)        Past Surgical History:   Procedure Laterality Date    EXCIS TENDON SHEATH LESN,WRIST/FORE      HX TONSILLECTOMY         Family History   Problem Relation Age of Onset    Other Mother         high cholestrol, fibromyalia, lupus    Diabetes Mother     Hypertension Mother     Elevated Lipids Mother     Other Maternal Grandmother         high blood presssure, heart disease, diabetes    Diabetes Maternal Grandmother     Heart Disease Maternal Grandmother     Hypertension Maternal Grandmother     Elevated Lipids Maternal Grandmother     Heart Attack Maternal Grandfather     No Known Problems Father        Social History     Socioeconomic History    Marital status: SINGLE     Spouse name: Not on file    Number of children: Not on file    Years of education: Not on file    Highest education level: Not on file   Occupational History    Not on file   Social Needs    Financial resource strain: Not on file    Food insecurity:     Worry: Not on file     Inability: Not on file    Transportation needs:     Medical: Not on file     Non-medical: Not on file   Tobacco Use    Smoking status: Never Smoker    Smokeless tobacco: Never Used   Substance and Sexual Activity    Alcohol use: Yes     Comment: 1 per day    Drug use: No    Sexual activity: Not Currently     Partners: Male     Birth control/protection: Condom, Pill   Lifestyle    Physical activity:     Days per week: Not on file     Minutes per session: Not on file    Stress: Not on file   Relationships    Social connections:     Talks on phone: Not on file     Gets together: Not on file     Attends [employer] service: Not on file     Active member of club or organization: Not on file     Attends meetings of clubs or organizations: Not on file     Relationship status: Not on file    Intimate partner violence:     Fear of current or ex partner: Not on file     Emotionally abused: Not on file     Physically abused: Not on file     Forced sexual activity: Not on file   Other Topics Concern    Not on file   Social History Narrative    ** Merged History Encounter **     MALIA Del Rosario Interested in [employer] Works at [employer] 10/2018       Current Outpatient Medications   Medication Sig Dispense Refill    LO LOESTRIN FE 1 mg-10 mcg (24)/10 mcg (2) tab TAKE 1 TABLET BY MOUTH EVERY DAY  4    metroNIDAZOLE (FLAGYL) 500 mg tablet Take all 4 tablets now. 4 Tab 0    metFORMIN (GLUCOPHAGE) 1,000 mg tablet Take 1 Tab by mouth two (2) times daily (with meals). 60 Tab 4    metaxalone (SKELAXIN) 400 mg tablet Take 1 Tab by mouth three (3) times daily. Indications: MUSCLE SPASM 15 Tab 1    magnesium oxide (MAG-OX) 400 mg tablet Take 1 Tab by mouth two (2) times a day.  60 Tab 2    ibuprofen (MOTRIN) 600 mg tablet Take 1 Tab by mouth every six (6) hours as needed for Pain. 20 Tab 0    famotidine (PEPCID) 20 mg tablet Take 40 mg by mouth as needed.  loratadine (CLARITIN) 10 mg tablet Take 1 Tab by mouth daily. Indications: hives, swelling 60 Tab 4    norgestimate-ethinyl estradiol (ORTHO-CYCLEN, SPRINTEC) 0.25-35 mg-mcg tab Take 1 Tab by mouth daily. SKIP THE PLACEBO PILLS 3 Package 4    EPINEPHrine (EPIPEN) 0.3 mg/0.3 mL injection 0.3 mL by IntraMUSCular route once as needed for up to 1 dose. 2 Syringe 1    albuterol (PROVENTIL HFA, VENTOLIN HFA, PROAIR HFA) 90 mcg/actuation inhaler Take 2 Puffs by inhalation every four (4) hours as needed for Wheezing or Shortness of Breath.  1 Inhaler 1       Allergies   Allergen Reactions    Other Plant, Animal, Environmental Runny Nose     Seasonal allergies       Review of Systems - History obtained from the patient  Constitutional: negative for weight loss, fever, night sweats  HEENT: negative for hearing loss, earache, congestion, snoring, sorethroat  CV: negative for chest pain, palpitations, edema  Resp: negative for cough, shortness of breath, wheezing  GI: negative for change in bowel habits, abdominal pain, black or bloody stools  : negative for frequency, dysuria, hematuria, vaginal discharge, +pelvic pain, dyspareunia, irregular menses   MSK: negative for back pain, joint pain, muscle pain  Breast: negative for breast lumps, nipple discharge, galactorrhea  Skin :negative for itching, rash, hives, +acne, mild  Neuro: negative for dizziness, headache, confusion, weakness  Psych: negative for anxiety, depression, change in mood  Heme/lymph: negative for bleeding, bruising, pallor    Physical Exam  Visit Vitals  /86 (BP 1 Location: Left arm, BP Patient Position: Sitting)   Ht 5' 2\" (1.575 m)   Wt 314 lb (142.4 kg)   LMP 05/17/2019   BMI 57.43 kg/m²     PHYSICAL EXAMINATION    Constitutional  · Appearance: well-nourished, well developed, alert, in no acute distress, +morbidly obese    HENT  · Head and Face: appears normal    Neck  · Inspection/Palpation: normal appearance, no masses or tenderness  · Lymph Nodes: no lymphadenopathy present  · Thyroid: gland size normal, nontender, no nodules or masses present on palpation    Chest  · Respiratory Effort: breathing labored  · Auscultation: normal breath sounds    Cardiovascular  · Heart:  · Auscultation: regular rate and rhythm without murmur    Gastrointestinal  · Abdominal Examination: abdomen non-tender to palpation, normal bowel sounds, no masses present, +obese  · Liver and spleen: no hepatomegaly present, spleen not palpable  · Hernias: no hernias identified    Genitourinary  · External Genitalia: normal appearance for age, no discharge present, no tenderness present, no inflammatory lesions present, no masses present, no atrophy present  · Vagina: normal vaginal vault without central or paravaginal defects, no inflammatory lesions present, no masses present, 1-2 scopettes of old brown blood c/w menses, +odor  · Bladder: non-tender to palpation  · Urethra: appears normal  · Cervix: normal, no cervicitis or CMT   · Uterus: normal size, shape and consistency  · Adnexa: no adnexal tenderness present, no adnexal masses appreciated, but exam limited by habitus  · Perineum: perineum within normal limits, no evidence of trauma, no rashes or skin lesions present  · Anus: anus within normal limits, no hemorrhoids present  · Inguinal Lymph Nodes: no lymphadenopathy present    Skin  · General Inspection: no rash, no lesions identified    Neurologic/Psychiatric  · Mental Status:  · Orientation: grossly oriented to person, place and time  · Mood and Affect: mood normal, affect appropriate        Assessment/Plan:  26 y.o. morbidly obese G0 presenting for vaginal discharge, BTB on Lo Loestrin, and palpitations.     Vaginal d/c:  -nuswab plus today  -advised avoidance of irritants, good hygiene    BTB on Lo Lo:  -transition back to [facility] OCPs    Palpitations:  -referral to cardiology and advised f/u with PCP  -ER for any CP, SOB, lightheadedness or other concerning features    RTC: for AE or sooner alla Arias MD  6/7/2019  3:24 PM

## 2019-06-07 NOTE — PATIENT INSTRUCTIONS

## 2019-06-12 LAB
A VAGINAE DNA VAG QL NAA+PROBE: NORMAL SCORE
BVAB2 DNA VAG QL NAA+PROBE: NORMAL SCORE
C ALBICANS DNA VAG QL NAA+PROBE: NEGATIVE
C GLABRATA DNA VAG QL NAA+PROBE: NEGATIVE
C TRACH RRNA SPEC QL NAA+PROBE: NEGATIVE
MEGA1 DNA VAG QL NAA+PROBE: NORMAL SCORE
N GONORRHOEA RRNA SPEC QL NAA+PROBE: NEGATIVE
T VAGINALIS RRNA SPEC QL NAA+PROBE: NEGATIVE

## 2019-07-01 ENCOUNTER — HOSPITAL ENCOUNTER (EMERGENCY)
Age: 24
Discharge: HOME | End: 2019-07-02
Attending: EMERGENCY MEDICINE | Admitting: EMERGENCY MEDICINE
Payer: SELF-PAY

## 2019-07-01 DIAGNOSIS — A60.00 GENITAL HERPES SIMPLEX, UNSPECIFIED SITE: Primary | ICD-10-CM

## 2019-07-01 DIAGNOSIS — R31.9 URINARY TRACT INFECTION WITH HEMATURIA, SITE UNSPECIFIED: ICD-10-CM

## 2019-07-01 DIAGNOSIS — N39.0 URINARY TRACT INFECTION WITH HEMATURIA, SITE UNSPECIFIED: ICD-10-CM

## 2019-07-01 LAB
KOH PREP SPEC: NORMAL
SERVICE CMNT-IMP: NORMAL

## 2019-07-01 PROCEDURE — 74011250636 HC RX REV CODE- 250/636: Performed by: PHYSICIAN ASSISTANT

## 2019-07-01 PROCEDURE — 87210 SMEAR WET MOUNT SALINE/INK: CPT

## 2019-07-01 PROCEDURE — 96372 THER/PROPH/DIAG INJ SC/IM: CPT

## 2019-07-01 PROCEDURE — 74011250637 HC RX REV CODE- 250/637: Performed by: PHYSICIAN ASSISTANT

## 2019-07-01 PROCEDURE — 87491 CHLMYD TRACH DNA AMP PROBE: CPT

## 2019-07-01 PROCEDURE — 99284 EMERGENCY DEPT VISIT MOD MDM: CPT

## 2019-07-01 PROCEDURE — 87252 VIRUS INOCULATION TISSUE: CPT

## 2019-07-01 RX ORDER — AZITHROMYCIN 250 MG/1
1000 TABLET, FILM COATED ORAL
Status: COMPLETED | OUTPATIENT
Start: 2019-07-01 | End: 2019-07-01

## 2019-07-01 RX ORDER — ACYCLOVIR 400 MG/1
400 TABLET ORAL 3 TIMES DAILY
Qty: 30 TAB | Refills: 0 | Status: SHIPPED | OUTPATIENT
Start: 2019-07-01 | End: 2019-07-11

## 2019-07-01 RX ORDER — ACYCLOVIR 800 MG/1
400 TABLET ORAL
Status: COMPLETED | OUTPATIENT
Start: 2019-07-01 | End: 2019-07-01

## 2019-07-01 RX ADMIN — LIDOCAINE HYDROCHLORIDE 1 G: 10 INJECTION, SOLUTION EPIDURAL; INFILTRATION; INTRACAUDAL; PERINEURAL at 23:54

## 2019-07-01 RX ADMIN — AZITHROMYCIN MONOHYDRATE 1000 MG: 250 TABLET ORAL at 23:53

## 2019-07-01 RX ADMIN — ACYCLOVIR 400 MG: 800 TABLET ORAL at 23:53

## 2019-07-02 VITALS
HEART RATE: 102 BPM | RESPIRATION RATE: 18 BRPM | DIASTOLIC BLOOD PRESSURE: 85 MMHG | OXYGEN SATURATION: 99 % | SYSTOLIC BLOOD PRESSURE: 120 MMHG | TEMPERATURE: 98.9 F

## 2019-07-02 LAB
AMORPH CRY URNS QL MICRO: ABNORMAL
APPEARANCE UR: ABNORMAL
BACTERIA URNS QL MICRO: NEGATIVE /HPF
BILIRUB UR QL: NEGATIVE
COLOR UR: ABNORMAL
EPITH CASTS URNS QL MICRO: ABNORMAL /LPF
GLUCOSE UR STRIP.AUTO-MCNC: NEGATIVE MG/DL
HCG UR QL: NEGATIVE
HGB UR QL STRIP: ABNORMAL
KETONES UR QL STRIP.AUTO: NEGATIVE MG/DL
LEUKOCYTE ESTERASE UR QL STRIP.AUTO: ABNORMAL
MUCOUS THREADS URNS QL MICRO: ABNORMAL /LPF
NITRITE UR QL STRIP.AUTO: NEGATIVE
PH UR STRIP: 6 [PH] (ref 5–8)
PROT UR STRIP-MCNC: NEGATIVE MG/DL
RBC #/AREA URNS HPF: ABNORMAL /HPF (ref 0–5)
SP GR UR REFRACTOMETRY: 1.02 (ref 1–1.03)
UR CULT HOLD, URHOLD: NORMAL
UROBILINOGEN UR QL STRIP.AUTO: 0.2 EU/DL (ref 0.2–1)
WBC URNS QL MICRO: >100 /HPF (ref 0–4)

## 2019-07-02 PROCEDURE — 81001 URINALYSIS AUTO W/SCOPE: CPT

## 2019-07-02 PROCEDURE — 81025 URINE PREGNANCY TEST: CPT

## 2019-07-02 RX ORDER — CEPHALEXIN 500 MG/1
500 CAPSULE ORAL 2 TIMES DAILY
Qty: 14 CAP | Refills: 0 | Status: SHIPPED | OUTPATIENT
Start: 2019-07-02 | End: 2019-07-09

## 2019-07-02 NOTE — DISCHARGE INSTRUCTIONS
Patient Education        Genital Herpes: Care Instructions  Your Care Instructions  Genital herpes is caused by a virus called herpes simplex. There are two types of this virus. Type 2 is the type that usually causes genital herpes. But type 1 can also cause it. Type 1 is the type that causes cold sores. Genital herpes is a sexually transmitted infection (STI). The most common way to get it is through sexual or other contact with someone who has herpes. For example, the virus can spread from a sore in the genital area to the lips. And it can spread from a cold sore on the lips to the genital area. Some people are surprised to find out that they have herpes or that they gave it to someone else. This is because a lot of people who have it don't know that they have it. They may not get sores or they may have sores that they cannot see. But the virus can still be spread by a person who does not have obvious sores or symptoms. There is no cure for herpes, but antiviral medicine can help you feel better and help prevent more outbreaks. This medicine may also lower the chance of spreading the virus. Follow-up care is a key part of your treatment and safety. Be sure to make and go to all appointments, and call your doctor if you are having problems. It's also a good idea to know your test results and keep a list of the medicines you take. How can you care for yourself at home? · Be safe with medicines. If your doctor prescribes medicine, take it exactly as prescribed. Call your doctor if you think you are having a problem with your medicine. You will get more details on the specific medicines your doctor prescribes. · To reduce the pain and itching from herpes sores:  ? Wash the area with water 3 or 4 times a day. ? Keep the sores clean and dry in between baths or showers. You may want to let the sores air dry. This may feel better than a towel. ? Wear cotton underwear. Cotton absorbs moisture well.   ? Take an over-the-counter pain medicine, such as acetaminophen (Tylenol), ibuprofen (Advil, Motrin), or naproxen (Aleve). Read and follow all instructions on the label. Do not take two or more pain medicines at the same time unless the doctor told you to. Many pain medicines have acetaminophen, which is Tylenol. Too much acetaminophen (Tylenol) can be harmful. To prevent the spread of genital herpes  · Latex condoms are a good way to reduce the risk of spreading the virus. But you can still get the virus even if you use a condom. For the best protection, use condoms every time you have sex, from the beginning to the end of sexual contact. Remember that you can infect someone even if you do not have obvious symptoms or sores. · Avoid sexual contact when you have symptoms. Symptoms include sores, tingling, or pain. · Wash your hands if you touch a sore. In some cases, especially if this is your first herpes outbreak, you can spread the virus to other parts of your body or to other people. · Having one sex partner (who does not have STIs and does not have sex with anyone else) is a good way to avoid STIs. · Talk to your sex partner or partners about genital herpes. · Encourage your sex partners to get a blood test to see if they have been infected. When should you call for help? Call your doctor now or seek immediate medical care if:    · You have a new fever.     · There is increasing redness or red streaks around herpes sores.    Watch closely for changes in your health, and be sure to contact your doctor if:    · You have herpes and you think you might be pregnant.     · You have an outbreak of herpes sores, and the sores are not healing.     · You have frequent outbreaks of genital herpes sores.     · You are unable to pass urine or are constipated.     · You want to start antiviral medicine.     · You do not get better as expected. Where can you learn more?   Go to http://lois-iris.info/. Enter I712 in the search box to learn more about \"Genital Herpes: Care Instructions. \"  Current as of: September 11, 2018  Content Version: 11.9  © 4510-2225 [employer], [employer] Care instructions adapted under license by [employer] (which disclaims liability or warranty for this information). If you have questions about a medical condition or this instruction, always ask your healthcare professional. [place] [address] any warranty or liability for your use of this information.

## 2019-07-02 NOTE — ED TRIAGE NOTES
TRIAGE NOTE:  Patient arrives with c/o vaginal irritation. Patient reports she had sex last Sunday and has now noticed sores around \"lip\" of her vagina. Patient reports shaving and having \"cuts\", and \"the condom was really dried out so I don't know if it just irritated it. \".

## 2019-07-02 NOTE — ED PROVIDER NOTES
25 yo F with hx of asthma, PCOS, and elevated blood sugar here for vaginal irriation. Sores to vaginal region x 4-5 days. +Dysuria. New partner 1 week ago; +condom. Unsure of discharge. Denies fever, chills. GYN Dr Caballero/Brenton (unsure which). The history is provided by the patient. Vaginal Pain    This is a new problem. The current episode started more than 2 days ago. The problem occurs constantly. Associated symptoms include dysuria. Pertinent negatives include no fever.         Past Medical History:   Diagnosis Date    Asthma     BMI 50.0-59.9, adult ([facility] [address] 2/2/2017    Elevated blood sugar level     PCOS (polycystic ovarian syndrome)        Past Surgical History:   Procedure Laterality Date    EXCIS TENDON SHEATH LESN,WRIST/FORE      HX TONSILLECTOMY           Family History:   Problem Relation Age of Onset    Other Mother         high cholestrol, fibromyalia, lupus    Diabetes Mother     Hypertension Mother    Kelly Specter Elevated Lipids Mother     Other Maternal Grandmother         high blood presssure, heart disease, diabetes    Diabetes Maternal Grandmother     Heart Disease Maternal Grandmother     Hypertension Maternal Grandmother     Elevated Lipids Maternal Grandmother     Heart Attack Maternal Grandfather     No Known Problems Father        Social History     Socioeconomic History    Marital status: SINGLE     Spouse name: Not on file    Number of children: Not on file    Years of education: Not on file    Highest education level: Not on file   Occupational History    Not on file   Social Needs    Financial resource strain: Not on file    Food insecurity:     Worry: Not on file     Inability: Not on file    Transportation needs:     Medical: Not on file     Non-medical: Not on file   Tobacco Use    Smoking status: Never Smoker    Smokeless tobacco: Never Used   Substance and Sexual Activity    Alcohol use: Yes     Comment: 1 per day    Drug use: No    Sexual activity: Not Currently     Partners: Male     Birth control/protection: Condom, Pill   Lifestyle    Physical activity:     Days per week: Not on file     Minutes per session: Not on file    Stress: Not on file   Relationships    Social connections:     Talks on phone: Not on file     Gets together: Not on file     Attends [employer] service: Not on file     Active member of club or organization: Not on file     Attends meetings of clubs or organizations: Not on file     Relationship status: Not on file    Intimate partner violence:     Fear of current or ex partner: Not on file     Emotionally abused: Not on file     Physically abused: Not on file     Forced sexual activity: Not on file   Other Topics Concern    Not on file   Social History Narrative    ** Merged History Encounter **     MALIA Del Rosario Interested in [place] Works at [employer] 10/2018         ALLERGIES: Other plant, animal, environmental    Review of Systems   Constitutional: Negative for activity change and fever. HENT: Negative for facial swelling. Eyes: Negative for discharge. Respiratory: Negative for cough. Cardiovascular: Negative for leg swelling. Gastrointestinal: Negative for abdominal distention. Genitourinary: Positive for dysuria and vaginal pain. Skin: Negative for color change. Neurological: Negative for seizures and syncope. Psychiatric/Behavioral: Negative for behavioral problems. Vitals:    07/01/19 2048   Pulse: 100   SpO2: 96%            Physical Exam   Constitutional: She is oriented to person, place, and time. She appears well-developed and well-nourished. HENT:   Head: Normocephalic and atraumatic. Right Ear: External ear normal.   Left Ear: External ear normal.   Nose: Nose normal.   Mouth/Throat: Oropharynx is clear and moist.   Eyes: Pupils are equal, round, and reactive to light. Conjunctivae and EOM are normal. Right eye exhibits no discharge. Left eye exhibits no discharge.    Neck: Normal range of motion. Neck supple. Cardiovascular: Normal rate, regular rhythm, normal heart sounds and intact distal pulses. Pulmonary/Chest: Effort normal and breath sounds normal.   Abdominal: Soft. Bowel sounds are normal. She exhibits no distension. There is no tenderness. There is no rebound and no guarding. Genitourinary:   Genitourinary Comments: Performed by Arnel Ramon PA-C. The external vulva and vagina have multiple open lesions; +tender; no laceration. Yellow drainage from vaginal canal; unable to tolerate speculum or manual exam. MICHEAL Varghese       Musculoskeletal: Normal range of motion. She exhibits no edema or tenderness. Neurological: She is alert and oriented to person, place, and time. No cranial nerve deficit. Coordination normal.   Skin: Skin is warm and dry. No rash noted. Psychiatric: She has a normal mood and affect. Her behavior is normal. Judgment and thought content normal.   Nursing note and vitals reviewed. MDM  Number of Diagnoses or Management Options  Genital herpes simplex, unspecified site:   Urinary tract infection with hematuria, site unspecified:      Amount and/or Complexity of Data Reviewed  Clinical lab tests: ordered and reviewed  Discuss the patient with other providers: yes           Procedures    Patient has been reassessed. Reviewed labs, medications and radiographics with patient. Ready to discharge home. Aware of findings and will follow up with GYN. Discussed case with attending Physician. Agrees with care and will D/C with follow up. Patient's results have been reviewed with them. Patient and/or family have verbally conveyed their understanding and agreement of the patient's signs, symptoms, diagnosis, treatment and prognosis and additionally agree to follow up as recommended or return to the Emergency Room should their condition change prior to follow-up.   Discharge instructions have also been provided to the patient with some educational information regarding their diagnosis as well a list of reasons why they would want to return to the ER prior to their follow-up appointment should their condition change.   Craven Severin, PA

## 2019-07-03 LAB
C TRACH DNA SPEC QL NAA+PROBE: NEGATIVE
N GONORRHOEA DNA SPEC QL NAA+PROBE: NEGATIVE
SAMPLE TYPE: NORMAL
SERVICE CMNT-IMP: NORMAL
SPECIMEN SOURCE: NORMAL

## 2019-07-04 LAB
SPECIMEN SOURCE: ABNORMAL
VIRUS SPEC CULT: ABNORMAL

## 2019-07-09 ENCOUNTER — OFFICE VISIT (OUTPATIENT)
Dept: OBGYN CLINIC | Age: 24
End: 2019-07-09

## 2019-07-09 VITALS
HEIGHT: 62 IN | BODY MASS INDEX: 53.92 KG/M2 | DIASTOLIC BLOOD PRESSURE: 90 MMHG | WEIGHT: 293 LBS | SYSTOLIC BLOOD PRESSURE: 142 MMHG

## 2019-07-09 DIAGNOSIS — B00.9 HSV-2 INFECTION: Primary | ICD-10-CM

## 2019-07-09 RX ORDER — VALACYCLOVIR HYDROCHLORIDE 500 MG/1
500 TABLET, FILM COATED ORAL 2 TIMES DAILY
Qty: 10 TAB | Refills: 4 | Status: SHIPPED | OUTPATIENT
Start: 2019-07-09 | End: 2019-07-14

## 2019-07-09 NOTE — PROGRESS NOTES
ED Follow Up    Karuna Mccray is a 26 y.o. morbidly obese (BMI 62) G0 with PMH of possible PCOS and irregular cycles now presenting for ER follow up after being diagnosed with both HSV outbreak and staph infection. Pt was on both acyclovir and keflex. She has completed abx, and has a few more days of acyclovir. Reports she was also treated empirically for GC and Chl. Was seen at both [facility] and [facility] ERs. Feels like lesions are resolving and declines exam today. Unclear diagnosis of PCOS as prior ultrasound showing normal ovaries and PCOS labs wnl.    +trich at prior visit --> both she and her partner were treated. 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 Going to school at [employer], plans to transfer to [address] [place] [address] next semester. TVUS 12/4/18:  THE UTERUS IS ANTEVERTED, NORMAL IN SIZE AND ECHOGENICITY. THE ENDOMETRIUM MEASURES 5MM IN THICKNESS. NO MASSES OR ABNORMALITIES ARE SEEN. RIGHT OVARY APPEARS WNL. LEFT OVARY APPEARS WNL. NO FREE FLUID IS SEEN IN THE CDS. Ob/Gyn Hx:  G0   LMP - 6/6/19  Menarche- age 18  Menses- regular  Contraception- condoms, [place] Akbar OCP  STI- denies  ? SA- not currently    Health maintenance:  Pap- 3/1/19 NILM   Gardasil- completed series    Past Medical History:   Diagnosis Date    Asthma     BMI 50.0-59.9, adult ([facility] [address] 2/2/2017    Elevated blood sugar level     PCOS (polycystic ovarian syndrome)        Past Surgical History:   Procedure Laterality Date    EXCIS TENDON SHEATH LESN,WRIST/FORE      HX TONSILLECTOMY         Family History   Problem Relation Age of Onset    Other Mother         high cholestrol, fibromyalia, lupus    Diabetes Mother     Hypertension Mother     Elevated Lipids Mother     Other Maternal Grandmother         high blood presssure, heart disease, diabetes    Diabetes Maternal Grandmother     Heart Disease Maternal Grandmother     Hypertension Maternal Grandmother     Elevated Lipids Maternal Grandmother     Heart Attack Maternal Grandfather     No Known Problems Father        Social History     Socioeconomic History    Marital status: SINGLE     Spouse name: Not on file    Number of children: Not on file    Years of education: Not on file    Highest education level: Not on file   Occupational History    Not on file   Social Needs    Financial resource strain: Not on file    Food insecurity:     Worry: Not on file     Inability: Not on file    Transportation needs:     Medical: Not on file     Non-medical: Not on file   Tobacco Use    Smoking status: Never Smoker    Smokeless tobacco: Never Used   Substance and Sexual Activity    Alcohol use: Yes     Comment: 1 per day    Drug use: No    Sexual activity: Not Currently     Partners: Male     Birth control/protection: Condom, Pill   Lifestyle    Physical activity:     Days per week: Not on file     Minutes per session: Not on file    Stress: Not on file   Relationships    Social connections:     Talks on phone: Not on file     Gets together: Not on file     Attends Baptism service: Not on file     Active member of club or organization: Not on file     Attends meetings of clubs or organizations: Not on file     Relationship status: Not on file    Intimate partner violence:     Fear of current or ex partner: Not on file     Emotionally abused: Not on file     Physically abused: Not on file     Forced sexual activity: Not on file   Other Topics Concern    Not on file   Social History Narrative    ** Merged History Encounter **     MALIA Del Rosario Interested in N.P. Works at [employer] 10/2018       Current Outpatient Medications   Medication Sig Dispense Refill    norethindrone-e.estradiol-iron (JUNEL FE 1/20, 28, PO) Take  by mouth.  acyclovir (ZOVIRAX) 400 mg tablet Take 1 Tab by mouth three (3) times daily for 10 days. 30 Tab 0    cephALEXin (KEFLEX) 500 mg capsule Take 1 Cap by mouth two (2) times a day for 7 days.  14 Cap 0    metroNIDAZOLE (FLAGYL) 500 mg tablet Take all 4 tablets now. 4 Tab 0    metFORMIN (GLUCOPHAGE) 1,000 mg tablet Take 1 Tab by mouth two (2) times daily (with meals). 60 Tab 4    metaxalone (SKELAXIN) 400 mg tablet Take 1 Tab by mouth three (3) times daily. Indications: MUSCLE SPASM 15 Tab 1    magnesium oxide (MAG-OX) 400 mg tablet Take 1 Tab by mouth two (2) times a day. 60 Tab 2    ibuprofen (MOTRIN) 600 mg tablet Take 1 Tab by mouth every six (6) hours as needed for Pain. 20 Tab 0    famotidine (PEPCID) 20 mg tablet Take 40 mg by mouth as needed.  loratadine (CLARITIN) 10 mg tablet Take 1 Tab by mouth daily. Indications: hives, swelling 60 Tab 4    EPINEPHrine (EPIPEN) 0.3 mg/0.3 mL injection 0.3 mL by IntraMUSCular route once as needed for up to 1 dose. 2 Syringe 1    albuterol (PROVENTIL HFA, VENTOLIN HFA, PROAIR HFA) 90 mcg/actuation inhaler Take 2 Puffs by inhalation every four (4) hours as needed for Wheezing or Shortness of Breath.  1 Inhaler 1       Allergies   Allergen Reactions    Other Plant, Animal, Environmental Runny Nose     Seasonal allergies       Review of Systems - History obtained from the patient  Constitutional: negative for weight loss, fever, night sweats  HEENT: negative for hearing loss, earache, congestion, snoring, sorethroat  CV: negative for chest pain, palpitations, edema  Resp: negative for cough, shortness of breath, wheezing  GI: negative for change in bowel habits, abdominal pain, black or bloody stools  : negative for frequency, dysuria, hematuria, vaginal discharge, +vulvar lesions resolving  MSK: negative for back pain, joint pain, muscle pain  Breast: negative for breast lumps, nipple discharge, galactorrhea  Skin :negative for itching, rash, hives  Neuro: negative for dizziness, headache, confusion, weakness  Psych: negative for anxiety, depression, change in mood  Heme/lymph: negative for bleeding, bruising, pallor    Physical Exam  Visit Vitals  /90 (BP 1 Location: Left arm, BP Patient Position: Sitting)   Ht 5' 2\" (1.575 m)   Wt 312 lb 6.4 oz (141.7 kg)   LMP 06/06/2019   BMI 57.14 kg/m²     PHYSICAL EXAMINATION    Constitutional  · Appearance: well-nourished, well developed, alert, in no acute distress, +morbidly obese    HENT  · Head and Face: appears normal    Neck  · Inspection/Palpation: normal appearance, no masses or tenderness  · Lymph Nodes: no lymphadenopathy present  · Thyroid: gland size normal, nontender, no nodules or masses present on palpation    Chest  · Respiratory Effort: breathing labored  · Auscultation: normal breath sounds    Cardiovascular  · Heart:  · Auscultation: regular rate and rhythm without murmur    Gastrointestinal  · Abdominal Examination: abdomen non-tender to palpation, normal bowel sounds, no masses present, +obese  · Liver and spleen: no hepatomegaly present, spleen not palpable  · Hernias: no hernias identified    Genitourinary: declines pelvic exam today    Skin  · General Inspection: no rash, no lesions identified    Neurologic/Psychiatric  · Mental Status:  · Orientation: grossly oriented to person, place and time  · Mood and Affect: mood normal, affect appropriate        Assessment/Plan:  26 y.o. morbidly obese G0 presenting for ER follow up of newly diagnosed genital HSV2 and staph infx.  Reports symptoms resolving and declined exam today.     -counseled on safe sexual practices, condom use, etc.  -advised partner treatment  -Rx for valtrex for episodic therapy provided today  -cont Junel OCPs    RTC for AE or sooner alla Vargas MD  7/9/2019  3:15 PM

## 2020-01-17 ENCOUNTER — OFFICE VISIT (OUTPATIENT)
Dept: OBGYN CLINIC | Age: 25
End: 2020-01-17

## 2020-01-17 VITALS
WEIGHT: 293 LBS | DIASTOLIC BLOOD PRESSURE: 82 MMHG | SYSTOLIC BLOOD PRESSURE: 126 MMHG | HEIGHT: 62 IN | BODY MASS INDEX: 53.92 KG/M2

## 2020-01-17 DIAGNOSIS — Z01.419 WELL WOMAN EXAM WITH ROUTINE GYNECOLOGICAL EXAM: Primary | ICD-10-CM

## 2020-01-17 DIAGNOSIS — Z11.3 SCREENING FOR STD (SEXUALLY TRANSMITTED DISEASE): ICD-10-CM

## 2020-01-17 RX ORDER — NORETHINDRONE ACETATE AND ETHINYL ESTRADIOL 1.5-30(21)
1 KIT ORAL DAILY
Qty: 3 PACKAGE | Refills: 3 | Status: SHIPPED | OUTPATIENT
Start: 2020-01-17 | End: 2020-04-23 | Stop reason: CLARIF

## 2020-01-17 NOTE — PROGRESS NOTES
Annual exam    Marisa Ghotra is a 22 y.o. presenting for annual exam. Her main concerns today include: menorrhagia and dysmenorrhea on Junel Fe 1/20. Occasional btb. In , she went to the ED at [facility] and was told she had HSV and a staph infection. She would like to be rechecked for HSV in serology in blood today. She now thinks her symptoms may be due to a latex condom allergy. 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 Her sister is an MA for an OBGYN office.     Ob/Gyn Hx:      LMP -   Menses - regular  Contraception - OCPS  STI - desires  SA - yes    Health maintenance:  Pap - 2019 NIL  Gardasil - completed series      Past Medical History:   Diagnosis Date    Asthma     BMI 50.0-59.9, adult ([facility] [address] 2017    Elevated blood sugar level     HSV-2 infection     PCOS (polycystic ovarian syndrome)        Past Surgical History:   Procedure Laterality Date    HX TONSILLECTOMY      PA EXCIS TENDON SHEATH LESN,WRIST/FORE         Family History   Problem Relation Age of Onset    Other Mother         high cholestrol, fibromyalia, lupus    Diabetes Mother     Hypertension Mother    Angel Hock Elevated Lipids Mother     Other Maternal Grandmother         high blood presssure, heart disease, diabetes    Diabetes Maternal Grandmother     Heart Disease Maternal Grandmother     Hypertension Maternal Grandmother     Elevated Lipids Maternal Grandmother     Heart Attack Maternal Grandfather     No Known Problems Father        Social History     Socioeconomic History    Marital status: SINGLE     Spouse name: Not on file    Number of children: Not on file    Years of education: Not on file    Highest education level: Not on file   Occupational History    Not on file   Social Needs    Financial resource strain: Not on file    Food insecurity:     Worry: Not on file     Inability: Not on file    Transportation needs:     Medical: Not on file     Non-medical: Not on file   Tobacco Use    Smoking status: Never Smoker    Smokeless tobacco: Never Used   Substance and Sexual Activity    Alcohol use: Yes     Comment: 1 per day    Drug use: No    Sexual activity: Yes     Partners: Male     Birth control/protection: Condom, Pill   Lifestyle    Physical activity:     Days per week: Not on file     Minutes per session: Not on file    Stress: Not on file   Relationships    Social connections:     Talks on phone: Not on file     Gets together: Not on file     Attends Mosque service: Not on file     Active member of club or organization: Not on file     Attends meetings of clubs or organizations: Not on file     Relationship status: Not on file    Intimate partner violence:     Fear of current or ex partner: Not on file     Emotionally abused: Not on file     Physically abused: Not on file     Forced sexual activity: Not on file   Other Topics Concern    Not on file   Social History Narrative    ** Merged History Encounter **     MALIA Del Rosario Interested in N.P. Works at [employer] 10/2018       Current Outpatient Medications   Medication Sig Dispense Refill    norethindrone-e.estradiol-iron (JUNEL FE 1/20, 28, PO) Take  by mouth.  metroNIDAZOLE (FLAGYL) 500 mg tablet Take all 4 tablets now. 4 Tab 0    metFORMIN (GLUCOPHAGE) 1,000 mg tablet Take 1 Tab by mouth two (2) times daily (with meals). 60 Tab 4    metaxalone (SKELAXIN) 400 mg tablet Take 1 Tab by mouth three (3) times daily. Indications: MUSCLE SPASM 15 Tab 1    magnesium oxide (MAG-OX) 400 mg tablet Take 1 Tab by mouth two (2) times a day. 60 Tab 2    ibuprofen (MOTRIN) 600 mg tablet Take 1 Tab by mouth every six (6) hours as needed for Pain. 20 Tab 0    famotidine (PEPCID) 20 mg tablet Take 40 mg by mouth as needed.  loratadine (CLARITIN) 10 mg tablet Take 1 Tab by mouth daily.  Indications: hives, swelling 60 Tab 4    EPINEPHrine (EPIPEN) 0.3 mg/0.3 mL injection 0.3 mL by IntraMUSCular route once as needed for up to 1 dose. 2 Syringe 1    albuterol (PROVENTIL HFA, VENTOLIN HFA, PROAIR HFA) 90 mcg/actuation inhaler Take 2 Puffs by inhalation every four (4) hours as needed for Wheezing or Shortness of Breath.  1 Inhaler 1       Allergies   Allergen Reactions    Other Plant, Animal, Environmental Runny Nose     Seasonal allergies       Review of Systems - History obtained from the patient  Constitutional: negative for weight loss, fever, night sweats  HEENT: negative for hearing loss, earache, congestion, snoring, sorethroat  CV: negative for chest pain, palpitations, edema  Resp: negative for cough, shortness of breath, wheezing  GI: negative for change in bowel habits, abdominal pain, black or bloody stools  : negative for frequency, dysuria, hematuria, vaginal discharge  MSK: negative for back pain, joint pain, muscle pain  Breast: negative for breast lumps, nipple discharge, galactorrhea  Skin :negative for itching, rash, hives  Neuro: negative for dizziness, headache, confusion, weakness  Psych: negative for anxiety, depression, change in mood  Heme/lymph: negative for bleeding, bruising, pallor    Physical Exam    Visit Vitals  /82   Ht 5' 2\" (1.575 m)   Wt 312 lb (141.5 kg)   LMP 12/21/2019 (Exact Date)   BMI 57.07 kg/m²       Constitutional  · Appearance: pleasant, obese well developed, alert, in no acute distress    HENT  · Head and Face: appears normal    Neck  · Inspection/Palpation: normal appearance, no masses or tenderness  · Lymph Nodes: no lymphadenopathy present  · Thyroid: gland size normal, nontender, no nodules or masses present on palpation    Chest  · Respiratory Effort: non-labored breathing  · Auscultation: CTAB, normal breath sounds    Cardiovascular  · Heart:  · Auscultation: regular rate and rhythm without murmur  · Extremities: no peripheral edema    Breasts  · Inspection of Breasts: breasts symmetrical, no skin changes, no discharge present, nipple appearance normal, no skin retraction present  · Palpation of Breasts and Axillae: no masses present on palpation, no breast tenderness  · Axillary Lymph Nodes: no lymphadenopathy present    Gastrointestinal  · Abdominal Examination: abdomen non-tender to palpation, normal bowel sounds, no masses present  · Liver and spleen: no hepatomegaly present, spleen not palpable  · Hernias: no hernias identified    Genitourinary  · External Genitalia: normal appearance for age, no discharge present, no tenderness present, no inflammatory lesions present, no masses present, no atrophy present  · Vagina: normal vaginal vault without central or paravaginal defects, no discharge present, no inflammatory lesions present, no masses present  · Bladder: non-tender to palpation  · Urethra: appears normal  · Cervix: normal   · Uterus: normal size, shape and consistency- exam limited by habitus  · Adnexa: no adnexal tenderness present, no adnexal masses present, non palpable  · Perineum: perineum within normal limits, no evidence of trauma, no rashes or skin lesions present    Skin  · General Inspection: no rash, no lesions identified    Neurologic/Psychiatric  · Mental Status:  · Orientation: grossly oriented to person, place and time  · Mood and Affect: mood normal, affect appropriate      Assessment/Plan:  22 y.o. presenting for annual exam. Overall doing well. Well woman exam:  Normal gynecologic and breast exams. Healthy habits and lifestyle reviewed. Pap UTD performed today. Patient accepts STD screening. HSV serology today per pt request.  Contraception and menstrual regulation - patient opts for cont OCPs but we opt to change to higher dose Junel to help with BRB. Rx Junel 1.5/30 fe sent.        Marsha Jenkins MD

## 2020-01-21 LAB
C TRACH RRNA SPEC QL NAA+PROBE: NEGATIVE
HSV1 IGG SER IA-ACNC: <0.91 INDEX (ref 0–0.9)
HSV1 IGM TITR SER IF: ABNORMAL TITER
HSV2 IGG SER IA-ACNC: 17.1 INDEX (ref 0–0.9)
HSV2 IGM TITR SER IF: ABNORMAL TITER
N GONORRHOEA RRNA SPEC QL NAA+PROBE: NEGATIVE
T VAGINALIS DNA SPEC QL NAA+PROBE: NEGATIVE

## 2020-01-21 NOTE — PROGRESS NOTES
Per Dr. Lila Waters- called and spoke with patient. Informed her of lab results. Patient verbalized understanding and had no questions at this time. Patient is aware the blood work results are not back yet.

## 2020-01-24 RX ORDER — VALACYCLOVIR HYDROCHLORIDE 500 MG/1
TABLET, FILM COATED ORAL
Qty: 90 TAB | Refills: 4 | Status: SHIPPED | OUTPATIENT
Start: 2020-01-24 | End: 2021-04-14

## 2020-01-24 NOTE — PROGRESS NOTES
Spoke with patient. Advised of results and recommendations. The patient verbalized understanding. Requesting Rx Valtrex for suppression therapy. Please advise if ok to send Rx.

## 2020-01-24 NOTE — PROGRESS NOTES
Marysol Sorto MD Donis Merritt, LPN             Yes, please send rx valtrex 500mg PO daily and to take 1 tab PO bid x3 days for outbreaks. #90 tabs with 4 refills    Previous Messages      ----- Message -----   From: Nain Harris LPN   Sent: 8/39/0972  10:28 AM EST   To: Warren Lim MD     Spoke with patient. Karlee Ashton of results and recommendations.  The patient verbalized understanding.  Requesting Rx Valtrex for suppression therapy.  Please advise if ok to send Rx. Rx sent as directed.    Signed By: Mera Gillette LPN     January 24, 0016

## 2020-02-14 ENCOUNTER — OFFICE VISIT (OUTPATIENT)
Dept: FAMILY MEDICINE CLINIC | Age: 25
End: 2020-02-14

## 2020-02-14 VITALS
WEIGHT: 293 LBS | DIASTOLIC BLOOD PRESSURE: 69 MMHG | SYSTOLIC BLOOD PRESSURE: 119 MMHG | RESPIRATION RATE: 20 BRPM | TEMPERATURE: 98.3 F | HEART RATE: 75 BPM | BODY MASS INDEX: 48.82 KG/M2 | HEIGHT: 65 IN | OXYGEN SATURATION: 100 %

## 2020-02-14 DIAGNOSIS — Z23 ENCOUNTER FOR IMMUNIZATION: ICD-10-CM

## 2020-02-14 DIAGNOSIS — E88.81 METABOLIC SYNDROME: ICD-10-CM

## 2020-02-14 DIAGNOSIS — Z00.00 INITIAL MEDICARE ANNUAL WELLNESS VISIT: Primary | ICD-10-CM

## 2020-02-14 DIAGNOSIS — L50.8 URTICARIA, CHRONIC: ICD-10-CM

## 2020-02-14 DIAGNOSIS — Z13.39 SCREENING FOR ALCOHOLISM: ICD-10-CM

## 2020-02-14 DIAGNOSIS — Z83.3 FAMILY HISTORY OF DIABETES MELLITUS (DM): ICD-10-CM

## 2020-02-14 DIAGNOSIS — Z13.31 SCREENING FOR DEPRESSION: ICD-10-CM

## 2020-02-14 LAB
BILIRUB UR QL STRIP: NEGATIVE
GLUCOSE UR-MCNC: NEGATIVE MG/DL
KETONES P FAST UR STRIP-MCNC: NEGATIVE MG/DL
PH UR STRIP: 7.5 [PH] (ref 4.6–8)
PROT UR QL STRIP: NEGATIVE
SP GR UR STRIP: 1.02 (ref 1–1.03)
UA UROBILINOGEN AMB POC: NORMAL (ref 0.2–1)
URINALYSIS CLARITY POC: CLEAR
URINALYSIS COLOR POC: YELLOW
URINE BLOOD POC: NEGATIVE
URINE LEUKOCYTES POC: NEGATIVE
URINE NITRITES POC: NEGATIVE

## 2020-02-14 NOTE — PATIENT INSTRUCTIONS
Medicare Wellness Visit, Female The best way to live healthy is to have a lifestyle where you eat a well-balanced diet, exercise regularly, limit alcohol use, and quit all forms of tobacco/nicotine, if applicable. Regular preventive services are another way to keep healthy. Preventive services (vaccines, screening tests, monitoring & exams) can help personalize your care plan, which helps you manage your own care. Screening tests can find health problems at the earliest stages, when they are easiest to treat. [facility] follows the current, evidence-based guidelines published by the [facility] ([facility]) when recommending preventive services for our patients. Because we follow these guidelines, sometimes recommendations change over time as research supports it. (For example, mammograms used to be recommended annually. Even though Medicare will still pay for an annual mammogram, the newer guidelines recommend a mammogram every two years for women of average risk). Of course, you and your doctor may decide to screen more often for some diseases, based on your risk and your co-morbidities (chronic disease you are already diagnosed with). Preventive services for you include: - Medicare offers their members a free annual wellness visit, which is time for you and your primary care provider to discuss and plan for your preventive service needs. Take advantage of this benefit every year! 
-All adults over the age of 72 should receive the recommended pneumonia vaccines. Current USPSTF guidelines recommend a series of two vaccines for the best pneumonia protection.  
-All adults should have a flu vaccine yearly and a tetanus vaccine every 10 years.  
-All adults age 48 and older should receive the shingles vaccines (series of two vaccines). -All adults age 38-68 who are overweight should have a diabetes screening test once every three years. -All adults born between [birth year] and 1964 should be screened once for Hepatitis C. 
-Other screening tests and preventive services for persons with diabetes include: an eye exam to screen for diabetic retinopathy, a kidney function test, a foot exam, and stricter control over your cholesterol.  
-Cardiovascular screening for adults with routine risk involves an electrocardiogram (ECG) at intervals determined by your doctor.  
-Colorectal cancer screenings should be done for adults age 54-65 with no increased risk factors for colorectal cancer. There are a number of acceptable methods of screening for this type of cancer. Each test has its own benefits and drawbacks. Discuss with your doctor what is most appropriate for you during your annual wellness visit. The different tests include: colonoscopy (considered the best screening method), a fecal occult blood test, a fecal DNA test, and sigmoidoscopy. 
 
-A bone mass density test is recommended when a woman turns 65 to screen for osteoporosis. This test is only recommended one time, as a screening. Some providers will use this same test as a disease monitoring tool if you already have osteoporosis. -Breast cancer screenings are recommended every other year for women of normal risk, age 54-69. 
-Cervical cancer screenings for women over age 72 are only recommended with certain risk factors. Hives: Care Instructions Your Care Instructions Hives are raised, red, itchy patches of skin. They are also called wheals or welts. They usually have red borders and pale centers. Hives range in size from ¼ inch to 3 inches or more across. They may seem to move from place to place on the skin. Several hives may form a large area of raised, red skin. You can get hives after an insect sting, after taking medicine or eating certain foods, or because of infection or stress. Other causes include plants, things you breathe in, makeup, heat, cold, sunlight, and latex. You cannot spread hives to other people. Hives may last a few minutes or a few days, but a single spot may last less than 36 hours. Follow-up care is a key part of your treatment and safety. Be sure to make and go to all appointments, and call your doctor if you are having problems. It's also a good idea to know your test results and keep a list of the medicines you take. How can you care for yourself at home? Avoid whatever you think may have caused your hives, such as a certain food or medicine. However, you may not Learning About Birth Control What is birth control? Birth control is any method used to prevent pregnancy. Another word for birth control is contraception. If you have sex without birth control, there is a chance that you could get pregnant. This is true even if you have not started having periods yet or you are getting close to menopause. The only sure way to prevent pregnancy is to not have sex. But finding a good method of birth control that you are comfortable with can help you avoid an unplanned pregnancy. Be sure to tell your doctor about any health problems you have or medicines you take. He or she can help you choose the birth control method that is right for you. What are the types of birth control? There are many different kinds of birth control. Each has pros and cons. Learning about all the methods will help you find one that is right for you. Long-acting reversible contraception (LARC) is the most effective reversible method you can use to prevent pregnancy. If you decide you want to get pregnant, you can have them removed. LARCs are implants and intrauterine devices (IUDs). While they are being used, they usually prevent pregnancy for years. Implants are placed under the skin of the arm. They release the hormone progestin and prevent pregnancy for about 3 years. IUDs are placed in the uterus by a doctor.  There are two main types of IUDsthe copper IUD and the hormonal IUD. The hormonal IUD releases progestin. IUDs prevent pregnancy for 3 to 10 years, depending on the type. Hormonal methods are very good at preventing pregnancy. Combination birth control pills (\"the pill\"), skin patches, and vaginal rings release the hormones estrogen and progestin. Shots, mini-pills, hormonal IUDs, and implants release progestin only. Barrier methods generally do not prevent pregnancy as well as IUDs or hormonal methods do. Barrier methods include condoms, diaphragms, cervical caps, and sponges. You must use barrier methods every time you have sex. Natural family planning can work if you and your partner are very careful and you have a regular ovulation cycle. You will need to keep good records so you know when you are most likely to become pregnant (you are fertile). And during times you are fertile, you will need to not have sex or to use a barrier method. Natural family planning is also known as fertility awareness and the rhythm method. Permanent birth control (sterilization) gives you lasting protection against pregnancy. A man can have a vasectomy, or a woman can have her tubes tied (tubal ligation). But this is only a good choice if you are sure that you don't want any (or any more) children. Emergency contraception is a backup method to prevent pregnancy if you didn't use birth control or a condom breaks. The most effective emergency contraception is prescribed by a doctor. This includes the copper IUD (inserted by a doctor) or a prescription pill. You can also get emergency contraceptive pills without a prescription at most drugstores. How do you choose the best method? The best method of birth control is one that protects you every time you have sex. This usually depends on how well you use it. To find a method that will work best for you, think about: How well it works.  Think about how important it is to you to avoid pregnancy. Then look at how well each method works. For example, if you plan to have a child soon anyway, you may not need a very reliable method. If you don't want children but feel it is wrong to end a pregnancy, choose a type of birth control that works very well. How much effort it takes. For example, birth control pills may not be a good choice if you often forget to take medicine. Or, if you are not sure you will stop and use a barrier method each time you have sex, pick another method. How much the method costs. For example, condoms are cheap or free in some clinics. Some insurance companies cover the cost of prescription birth control. But cost can sometimes be misleading. An IUD costs a lot up front. But it works for years, making it low-cost over time. Whether it protects you from infection. Latex condoms can help protect you from sexually transmitted infections (STIs), such as herpes or HIV/AIDS. But they are not the best way to prevent pregnancy. To avoid both STIs and pregnancy, use condoms along with another type of birth control. Whether you've had a problem with one kind of birth control. Finding the best method of birth control may involve trying something different. Also, you may need to change a method that once worked well for you. Whether you want children. If you are positive you don't want children, a lasting method of birth control might be best. 
Your health issues. Some birth control methods may not be safe for you, depending on your health issues. For example, women who smoke, are breastfeeding, or have had breast cancer may not be able to use certain methods. How can you get birth control? You can buy: 
Condoms, sponges, and spermicides without a prescription in drugstores, online, and in many grocery stores. Some forms of emergency contraception without a prescription at most drugstores. You need to see a doctor or visit a family planning clinic to: Get a prescription for birth control pills and other methods that use hormones. Have an implant or IUD inserted, including the type of IUD used for emergency contraception. Get a hormone shot. Get a prescription for a diaphragm or cervical cap. Get a prescription for certain kinds of emergency contraception. Where can you learn more? Go to http://lois-iris.info/. Enter D243 in the search box to learn more about \"Learning About Birth Control. \" Current as of: May 29, 2019 Content Version: 12.2 © 2792-2490 [employer]. Care instructions adapted under license by [employer] (which disclaims liability or warranty for this information). If you have questions about a medical condition or this instruction, always ask your healthcare professional. [address] any warranty or liability for your use of this information. Headache: Care Instructions Your Care Instructions Headaches have many possible causes. Most headaches aren't a sign of a more serious problem, and they will get better on their own. Home treatment may help you feel better faster. The doctor has checked you carefully, but problems can develop later. If you notice any problems or new symptoms, get medical treatment right away. Follow-up care is a key part of your treatment and safety. Be sure to make and go to all appointments, and call your doctor if you are having problems. It's also a good idea to know your test results and keep a list of the medicines you take. How can you care for yourself at home? Do not drive if you have taken a prescription pain medicine. Rest in a quiet, dark room until your headache is gone. Close your eyes and try to relax or go to sleep. Don't watch TV or read. Put a cold, moist cloth or cold pack on the painful area for 10 to 20 minutes at a time. Put a thin cloth between the cold pack and your skin. Use a warm, moist towel or a heating pad set on low to relax tight shoulder and neck muscles. Have someone gently massage your neck and shoulders. Take pain medicines exactly as directed. If the doctor gave you a prescription medicine for pain, take it as prescribed. If you are not taking a prescription pain medicine, ask your doctor if you can take an over-the-counter medicine. Be careful not to take pain medicine more often than the instructions allow, because you may get worse or more frequent headaches when the medicine wears off. Do not ignore new symptoms that occur with a headache, such as a fever, weakness or numbness, vision changes, or confusion. These may be signs of a more serious problem. To prevent headaches Keep a headache diary so you can figure out what triggers your headaches. Avoiding triggers may help you prevent headaches. Record when each headache began, how long it lasted, and what the pain was like (throbbing, aching, stabbing, or dull). Write down any other symptoms you had with the headache, such as nausea, flashing lights or dark spots, or sensitivity to bright light or loud noise. Note if the headache occurred near your period. List anything that might have triggered the headache, such as certain foods (chocolate, cheese, wine) or odors, smoke, bright light, stress, or lack of sleep. Find healthy ways to deal with stress. Headaches are most common during or right after stressful times. Take time to relax before and after you do something that has caused a headache in the past. 
Try to keep your muscles relaxed by keeping good posture. Check your jaw, face, neck, and shoulder muscles for tension, and try relaxing them. When sitting at a desk, change positions often, and stretch for 30 seconds each hour. Get plenty of sleep and exercise. Eat regularly and well. Long periods without food can trigger a headache. Treat yourself to a massage. Some people find that regular massages are very helpful in relieving tension. Limit caffeine by not drinking too much coffee, tea, or soda. But don't quit caffeine suddenly, because that can also give you headaches. Reduce eyestrain from computers by blinking frequently and looking away from the computer screen every so often. Make sure you have proper eyewear and that your monitor is set up properly, about an arm's length away. Seek help if you have depression or anxiety. Your headaches may be linked to these conditions. Treatment can both prevent headaches and help with symptoms of anxiety or depression. When should you call for help? Call 911 anytime you think you may need emergency care. For example, call if: 
  You have signs of a stroke. These may include: 
Sudden numbness, paralysis, or weakness in your face, arm, or leg, especially on only one side of your body. Sudden vision changes. Sudden trouble speaking. Sudden confusion or trouble understanding simple statements. Sudden problems with walking or balance. A sudden, severe headache that is different from past headaches.  
 Call your doctor now or seek immediate medical care if: 
  You have a new or worse headache.  
  Your headache gets much worse. Where can you learn more? Go to http://lois-iris.info/. Enter M271 in the search box to learn more about \"Headache: Care Instructions. \" Current as of: March 28, 2019 Content Version: 12.2 © 4222-7389 [employer]. Care instructions adapted under license by [employer] (which disclaims liability or warranty for this information). If you have questions about a medical condition or this instruction, always ask your healthcare professional. [place] [address] any warranty or liability for your use of this information. Palpitations: Care Instructions Your Care Instructions Heart palpitations are the uncomfortable sensation that your heart is beating fast or irregularly. You might feel pounding or fluttering in your chest. It might feel like your heart is skipping a beat. Although palpitations may be caused by a heart problem, they also occur because of stress, fatigue, or use of alcohol, caffeine, or nicotine. Many medicines, including diet pills, antihistamines, decongestants, and some herbal products, can cause heart palpitations. Nearly everyone has palpitations from time to time. Depending on your symptoms, your doctor may need to do more tests to try to find the cause of your palpitations. Follow-up care is a key part of your treatment and safety. Be sure to make and go to all appointments, and call your doctor if you are having problems. It's also a good idea to know your test results and keep a list of the medicines you take. How can you care for yourself at home? Avoid caffeine, nicotine, and excess alcohol. Do not take illegal drugs, such as methamphetamines and cocaine. Do not take weight loss or diet medicines unless you talk with your doctor first. 
Get plenty of sleep. Do not overeat. If you have palpitations again, take deep breaths and try to relax. This may slow a racing heart. If you start to feel lightheaded, lie down to avoid injuries that might result if you pass out and fall down. Keep a record of your palpitations and bring it to your next doctor's appointment. Write down: The date and time. Your pulse. (If your heart is beating fast, it may be hard to count your pulse.) What you were doing when the palpitations started. How long the palpitations lasted. Any other symptoms. If an activity causes palpitations, slow down or stop. Talk to your doctor before you do that activity again. Take your medicines exactly as prescribed. Call your doctor if you think you are having a problem with your medicine. When should you call for help? Call 911 anytime you think you may need emergency care. For example, call if: 
  You passed out (lost consciousness).  
  You have symptoms of a heart attack. These may include: 
Chest pain or pressure, or a strange feeling in the chest. 
Sweating. Shortness of breath. Pain, pressure, or a strange feeling in the back, neck, jaw, or upper belly or in one or both shoulders or arms. Lightheadedness or sudden weakness. A fast or irregular heartbeat. After you call 911, the  may tell you to chew 1 adult-strength or 2 to 4 low-dose aspirin. Wait for an ambulance. Do not try to drive yourself.  
  You have symptoms of a stroke. These may include: 
Sudden numbness, tingling, weakness, or loss of movement in your face, arm, or leg, especially on only one side of your body. Sudden vision changes. Sudden trouble speaking. Sudden confusion or trouble understanding simple statements. Sudden problems with walking or balance. A sudden, severe headache that is different from past headaches.  
 Call your doctor now or seek immediate medical care if: 
  You have heart palpitations and: 
Are dizzy or lightheaded, or you feel like you may faint. Have new or increased shortness of breath.  
 Watch closely for changes in your health, and be sure to contact your doctor if: 
  You continue to have heart palpitations. Where can you learn more? Go to http://lois-iris.info/. Enter R508 in the search box to learn more about \"Palpitations: Care Instructions. \" Current as of: April 9, 2019 Content Version: 12.2 © 8693-1052 [employer]. Care instructions adapted under license by [employer] (which disclaims liability or warranty for this information). If you have questions about a medical condition or this instruction, always ask your healthcare professional. [place] [address] any warranty or liability for your use of this information. · know the cause. · Put a cool, wet towel on the area to relieve itching. · Take an over-the-counter antihistamine, such as diphenhydramine (Benadryl), cetirizine (Zyrtec), or loratadine (Claritin), to help stop the hives and calm the itching. Read and follow directions on the label. These medicines can make you feel sleepy. Do not drive while using them. · Stay away from strong soaps, detergents, and chemicals. These can make itching worse. When should you call for help? Call 911 anytime you think you may need emergency care. For example, call if: 
  · You have symptoms of a severe allergic reaction. These may include: 
? Sudden raised, red areas (hives) all over your body. ? Swelling of the throat, mouth, lips, or tongue. ? Trouble breathing. ? Passing out (losing consciousness). Or you may feel very lightheaded or suddenly feel weak, confused, or restless.  
 Call your doctor now or seek immediate medical care if: 
  · You have symptoms of an allergic reaction, such as: ? A rash or hives (raised, red areas on the skin). ? Itching. ? Swelling. ? Belly pain, nausea, or vomiting.  
  · You get hives after you start a new medicine.  
  · Hives have not gone away after 24 hours.  
 Watch closely for changes in your health, and be sure to contact your doctor if: 
  · You do not get better as expected. Where can you learn more? Go to http://lois-iris.info/. Enter R409 in the search box to learn more about \"Hives: Care Instructions. \" Current as of: June 26, 2019 Content Version: 12.2 © 3181-9962 [employer]. Care instructions adapted under license by [employer] (which disclaims liability or warranty for this information). If you have questions about a medical condition or this instruction, always ask your healthcare professional. [address] any warranty or liability for your use of this information. Here is a list of your current Health Maintenance items (your personalized list of preventive services) with a due date: 
Health Maintenance Due Topic Date Due  
 Flu Vaccine  08/01/2019

## 2020-02-14 NOTE — PROGRESS NOTES
Name and  verified          Chief Complaint   Patient presents with   [address] Patient    Well Woman         Patient stated last PCP left practice.

## 2020-02-14 NOTE — PROGRESS NOTES
This is an Initial Medicare Annual Wellness Exam (AWV) (Performed 12 months after IPPE or effective date of Medicare Part B enrollment, Once in a lifetime)    I have reviewed the patient's medical history in detail and updated the computerized patient record. History     Her gyne and breast care is done elsewhere by her Ob-Gyne physician. Last wellness exam was few years ago  patient is currently up todate w/ all vaccination, last tetanus vaccine was ,  patient has had no hx of fall  not feeling depressed, no blood in the stool no tarry stool,   the depression at this age addressed pt with improvig interests and enjoy to do things, not anxious not depressed,  pt at this visit, is physically functional with nl gait  and nicely independent and walks w/out mobility device ,  mentaly is very functional,  very Alert and oriented, unfortunately,  BMI for pt's age is into obesity state,  the  [facility] BMI r/w'd and information given,      No family hx of breast cancer nor ovarian cancer,    Last pap smear result was normal on DEC 2019.     Last mammogram : none  Last colon cancer screening : n/a  Last bone density screen :none  Parent are  Healthy,  no family hx of colon cancer, patient is stating that the pt is sexaully active, uses safe sex ++physically active, father 72yo with diabetic state, mother  at age of 24yrs of unknown,     Currently on no meds, no protection  Single nokids no cigs, no Etoh, no illicit drugs    Rash of the neck, chest arms, has no lesions now  Started few weeks ago not better tried alcohol washing and OTC antibiotic ointments, dosenot tingles and not pain full, states that is notexpanding red, and not  swelled up, not rounds, with itchiness        Patient Active Problem List   Diagnosis Code    Metabolic syndrome C39.54    PCOS (polycystic ovarian syndrome) E28.2    BMI 50.0-59.9, adult (HCC) Z68.43    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 For outbreaks take 1 tablet po BID x 3 days 90 Tab 4    norethindrone-ethinyl estradiol-iron (JUNEL FE 1.5/30, 28,) 1.5 mg-30 mcg (21)/75 mg (7) tab Take 1 Tab by mouth daily. 3 Package 3    EPINEPHrine (EPIPEN) 0.3 mg/0.3 mL injection 0.3 mL by IntraMUSCular route once as needed for up to 1 dose. 2 Syringe 1    albuterol (PROVENTIL HFA, VENTOLIN HFA, PROAIR HFA) 90 mcg/actuation inhaler Take 2 Puffs by inhalation every four (4) hours as needed for Wheezing or Shortness of Breath. 1 Inhaler 1    norethindrone-e.estradiol-iron (JUNEL FE 1/20, 28, PO) Take  by mouth.  metroNIDAZOLE (FLAGYL) 500 mg tablet Take all 4 tablets now. 4 Tab 0    metFORMIN (GLUCOPHAGE) 1,000 mg tablet Take 1 Tab by mouth two (2) times daily (with meals). 60 Tab 4    metaxalone (SKELAXIN) 400 mg tablet Take 1 Tab by mouth three (3) times daily. Indications: MUSCLE SPASM 15 Tab 1    magnesium oxide (MAG-OX) 400 mg tablet Take 1 Tab by mouth two (2) times a day. 60 Tab 2    ibuprofen (MOTRIN) 600 mg tablet Take 1 Tab by mouth every six (6) hours as needed for Pain. 20 Tab 0    famotidine (PEPCID) 20 mg tablet Take 40 mg by mouth as needed.  loratadine (CLARITIN) 10 mg tablet Take 1 Tab by mouth daily.  Indications: hives, swelling 60 Tab 4     Allergies   Allergen Reactions    Other Plant, Animal, Environmental Runny Nose     Seasonal allergies       Family History   Problem Relation Age of Onset    Other Mother high cholestrol, fibromyalia, lupus    Diabetes Mother     Hypertension Mother    [place] Elevated Lipids Mother     Other Maternal Grandmother         high blood presssure, heart disease, diabetes    Diabetes Maternal Grandmother     Heart Disease Maternal Grandmother     Hypertension Maternal Grandmother     Elevated Lipids Maternal Grandmother     Heart Attack Maternal Grandfather     No Known Problems Father     Stroke Sister     Alcohol abuse Paternal Grandmother     Arthritis-osteo Paternal Grandfather      Social History     Tobacco Use    Smoking status: Never Smoker    Smokeless tobacco: Never Used   Substance Use Topics    Alcohol use: Yes     Comment: once a week       Depression Risk Factor Screening:     3 most recent PHQ Screens 2/14/2020   Little interest or pleasure in doing things Not at all   Feeling down, depressed, irritable, or hopeless Not at all   Total Score PHQ 2 0       Alcohol Risk Factor Screening:   Do you average 1 drink per night or more than 7 drinks a week:  No    On any one occasion in the past three months have you have had more than 3 drinks containing alcohol:  No      Functional Ability and Level of Safety:   Hearing: Hearing is good. Activities of Daily Living: The home contains: handrails, grab bars and rugs  Patient does total self care     Ambulation: with no difficulty    Fall Risk:  No flowsheet data found.     Abuse Screen:  Patient is not abused    Cognitive Screening   Has your family/caregiver stated any concerns about your memory: no  Cognitive Screening: Normal - MMSE (Mini Mental Status Exam)    Patient Care Team   Patient Care Team:  Nabil Carrington MD as PCP - General (Family Practice)  Gil Jay MD (Pediatric Endocrinology)    Assessment/Plan   Education and counseling provided:  Are appropriate based on today's review and evaluation  Influenza Vaccine  Screening Pap and pelvic (covered once every 2 years)  Diabetes screening test    Diagnoses and all orders for this visit:    1. Initial Medicare annual wellness visit  -     CBC W/O DIFF  -     AMB POC URINALYSIS DIP STICK AUTO W/O MICRO    2. Screening for alcoholism  -     [place] ANNUAL ALCOHOL SCREEN 15 MIN    3. Screening for depression  -     DEPRESSION SCREEN ANNUAL    4. Encounter for immunization  -     ADMIN INFLUENZA VIRUS VAC  -     INFLUENZA VIRUS VAC QUAD,SPLIT,PRESV FREE SYRINGE IM    5. Urticaria, chronic  -     FOOD ALLERGY PROFILE  -     AMB POC URINALYSIS DIP STICK AUTO W/O MICRO    6. Metabolic syndrome  -     CBC W/O DIFF  -     LIPID PANEL  -     METABOLIC PANEL, COMPREHENSIVE  -     TSH 3RD GENERATION    7. Family history of diabetes mellitus (DM)  -     HEMOGLOBIN A1C WITH EAG           AWV education and counseling provided:  Age appropriate evidence-based preventive care recommendations based on today's review and evaluation; including relevant cancer screening guidelines, and vaccination recommendations. An After Visit Summary was printed and given to the patient with information about these guidelines, and a personalized schedule for health maintenance items. When appropriate and with patient agreement, orders noted below were placed to complete missing health maintenance items.         Health Maintenance Due   Topic Date Due    Influenza Age 5 to Adult  08/01/2019

## 2020-02-17 LAB
ALBUMIN SERPL-MCNC: 4.1 G/DL (ref 3.9–5)
ALBUMIN/GLOB SERPL: 1.5 {RATIO} (ref 1.2–2.2)
ALP SERPL-CCNC: 63 IU/L (ref 39–117)
ALT SERPL-CCNC: 8 IU/L (ref 0–32)
AST SERPL-CCNC: 15 IU/L (ref 0–40)
BILIRUB SERPL-MCNC: 0.2 MG/DL (ref 0–1.2)
BUN SERPL-MCNC: 7 MG/DL (ref 6–20)
BUN/CREAT SERPL: 11 (ref 9–23)
CALCIUM SERPL-MCNC: 9.3 MG/DL (ref 8.7–10.2)
CHLORIDE SERPL-SCNC: 104 MMOL/L (ref 96–106)
CHOLEST SERPL-MCNC: 131 MG/DL (ref 100–199)
CLAM IGE QN: <0.1 KU/L
CO2 SERPL-SCNC: 19 MMOL/L (ref 20–29)
CODFISH IGE QN: <0.1 KU/L
CORN IGE QN: <0.1 KU/L
COW MILK IGE QN: 0.17 KU/L
CREAT SERPL-MCNC: 0.64 MG/DL (ref 0.57–1)
EGG WHITE IGE QN: 0.12 KU/L
ERYTHROCYTE [DISTWIDTH] IN BLOOD BY AUTOMATED COUNT: 14.1 % (ref 11.7–15.4)
EST. AVERAGE GLUCOSE BLD GHB EST-MCNC: 111 MG/DL
GLOBULIN SER CALC-MCNC: 2.8 G/DL (ref 1.5–4.5)
GLUCOSE SERPL-MCNC: 79 MG/DL (ref 65–99)
HBA1C MFR BLD: 5.5 % (ref 4.8–5.6)
HCT VFR BLD AUTO: 39.8 % (ref 34–46.6)
HDLC SERPL-MCNC: 59 MG/DL
HGB BLD-MCNC: 12.2 G/DL (ref 11.1–15.9)
LDLC SERPL CALC-MCNC: 57 MG/DL (ref 0–99)
Lab: ABNORMAL
MCH RBC QN AUTO: 24.3 PG (ref 26.6–33)
MCHC RBC AUTO-ENTMCNC: 30.7 G/DL (ref 31.5–35.7)
MCV RBC AUTO: 79 FL (ref 79–97)
PEANUT IGE QN: 0.12 KU/L
PLATELET # BLD AUTO: 327 X10E3/UL (ref 150–450)
POTASSIUM SERPL-SCNC: 4.3 MMOL/L (ref 3.5–5.2)
PROT SERPL-MCNC: 6.9 G/DL (ref 6–8.5)
RBC # BLD AUTO: 5.03 X10E6/UL (ref 3.77–5.28)
SCALLOP IGE QN: <0.1 KU/L
SESAME SEED IGE QN: 0.11 KU/L
SHRIMP IGE QN: <0.1 KU/L
SODIUM SERPL-SCNC: 139 MMOL/L (ref 134–144)
SOYBEAN IGE QN: <0.1 KU/L
TRIGL SERPL-MCNC: 73 MG/DL (ref 0–149)
TSH SERPL DL<=0.005 MIU/L-ACNC: 1.16 UIU/ML (ref 0.45–4.5)
VLDLC SERPL CALC-MCNC: 15 MG/DL (ref 5–40)
WALNUT IGE QN: <0.1 KU/L
WBC # BLD AUTO: 7.1 X10E3/UL (ref 3.4–10.8)
WHEAT IGE QN: 0.11 KU/L

## 2020-03-13 RX ORDER — EPINEPHRINE 0.3 MG/.3ML
0.3 INJECTION SUBCUTANEOUS
Qty: 2 SYRINGE | Refills: 5 | Status: SHIPPED | OUTPATIENT
Start: 2020-03-13 | End: 2020-03-13

## 2020-04-23 ENCOUNTER — TELEPHONE (OUTPATIENT)
Dept: OBGYN CLINIC | Age: 25
End: 2020-04-23

## 2020-04-23 RX ORDER — NORETHINDRONE ACETATE AND ETHINYL ESTRADIOL 1.5-30(21)
1 KIT ORAL DAILY
Qty: 3 PACKAGE | Refills: 3 | Status: SHIPPED | OUTPATIENT
Start: 2020-04-23 | End: 2021-01-18

## 2020-04-23 NOTE — TELEPHONE ENCOUNTER
Call received at 609am    22year old patient last seen in the office on 1/17/2020 for annual exam    Patient transferred her ocp prescription from one pharmacy to another and is being told she does not have any further refills. This nurse called the pharmacy to check on the status of the prescription. Pharmacy advised that when the prescription was transferred it did not come with refills. Prescription resent as per MD order to get patient to get her to when her next annual is due 1/2021      Patient advised of prescription resent to her preferred pharmacy to get her to when she is due for next annual exam    Patient verbalized understanding.

## 2021-02-04 NOTE — PROGRESS NOTES
Annual exam    Michelle Jennings is a 26 y.o. presenting for annual exam. Her main concerns today include stiffness in her pelvic area around the time of her period. She is overall doing well on Junel FE 1.5/30, using placebo pills to have a period once every 3 months. When she does have a period she describes it as heavy bleeding with pelvic stiffness. She is considering an IUD. She declines a chaperone during the gynecologic exam today.      Ob/Gyn Hx:  G0  LMP - first week of January  Menses - q 3 months  Contraception - Junel FE 1.5/30  STI - HSV 2, hx trich, accepts screening today  SA - not currently    Health maintenance:  Pap - 3/1/19 NIL  Gardasil -      Past Medical History:   Diagnosis Date    Asthma     BMI 50.0-59.9, adult ([facility] [address] 2/2/2017    Contact dermatitis and eczema due to cause     Elevated blood sugar level     HSV-2 infection     PCOS (polycystic ovarian syndrome)     Urticaria        Past Surgical History:   Procedure Laterality Date    HX TONSILLECTOMY      [place] EXCIS TENDON SHEATH LESN,WRIST/FORE         Family History   Problem Relation Age of Onset    Other Mother         high cholestrol, fibromyalia, lupus    Diabetes Mother     Hypertension Mother     Elevated Lipids Mother     Other Maternal Grandmother         high blood presssure, heart disease, diabetes    Diabetes Maternal Grandmother     Heart Disease Maternal Grandmother     Hypertension Maternal Grandmother     Elevated Lipids Maternal Grandmother     Heart Attack Maternal Grandfather     No Known Problems Father     Stroke Sister     Alcohol abuse Paternal Grandmother     Arthritis-osteo Paternal Grandfather        Social History     Socioeconomic History    Marital status: SINGLE     Spouse name: Not on file    Number of children: Not on file    Years of education: Not on file    Highest education level: Not on file   Occupational History    Not on file   Social Needs    Financial resource strain: Not on file    Food insecurity     Worry: Not on file     Inability: Not on file    Transportation needs     Medical: Not on file     Non-medical: Not on file   Tobacco Use    Smoking status: Never Smoker    Smokeless tobacco: Never Used   Substance and Sexual Activity    Alcohol use: Yes     Comment: once a week    Drug use: No    Sexual activity: Not Currently     Partners: Male     Birth control/protection: Condom   Lifestyle    Physical activity     Days per week: Not on file     Minutes per session: Not on file    Stress: Not on file   Relationships    Social connections     Talks on phone: Not on file     Gets together: Not on file     Attends Caodaism service: Not on file     Active member of club or organization: Not on file     Attends meetings of clubs or organizations: Not on file     Relationship status: Not on file    Intimate partner violence     Fear of current or ex partner: Not on file     Emotionally abused: Not on file     Physically abused: Not on file     Forced sexual activity: Not on file   Other Topics Concern    Not on file   Social History Narrative    ** Merged History Encounter **     MALIA Del Rosario Interested in N.P. Works at [employer] 10/2018       Current Outpatient Medications   Medication Sig Dispense Refill    norethindrone-ethinyl estradiol-iron (Junel FE 1.5/30, 28,) 1.5 mg-30 mcg (21)/75 mg (7) tab Take 1 Tab by mouth daily. 3 Package 4    Junel FE 1.5/30, 28, 1.5 mg-30 mcg (21)/75 mg (7) tab TAKE 1 TABLET BY MOUTH EVERY DAY 1 Package 1    valACYclovir (VALTREX) 500 mg tablet Take 1 tablet po daily for suppression therapy. For outbreaks take 1 tablet po BID x 3 days 90 Tab 4    metFORMIN (GLUCOPHAGE) 1,000 mg tablet Take 1 Tab by mouth two (2) times daily (with meals). 60 Tab 4    ibuprofen (MOTRIN) 600 mg tablet Take 1 Tab by mouth every six (6) hours as needed for Pain. 20 Tab 0    famotidine (PEPCID) 20 mg tablet Take 40 mg by mouth as needed.       loratadine (CLARITIN) 10 mg tablet Take 1 Tab by mouth daily. Indications: hives, swelling 60 Tab 4    EPINEPHrine (EPIPEN) 0.3 mg/0.3 mL injection 0.3 mL by IntraMUSCular route once as needed for up to 1 dose. 2 Syringe 1    albuterol (PROVENTIL HFA, VENTOLIN HFA, PROAIR HFA) 90 mcg/actuation inhaler Take 2 Puffs by inhalation every four (4) hours as needed for Wheezing or Shortness of Breath.  1 Inhaler 1       Allergies   Allergen Reactions    Other Plant, Animal, Environmental Runny Nose     Seasonal allergies       Review of Systems - History obtained from the patient  Constitutional: negative for weight loss, fever, night sweats  HEENT: negative for hearing loss, earache, congestion, snoring, sorethroat  CV: negative for chest pain, palpitations, edema  Resp: negative for cough, shortness of breath, wheezing  GI: negative for change in bowel habits, abdominal pain, black or bloody stools  : negative for frequency, dysuria, hematuria, vaginal discharge  MSK: negative for back pain, joint pain, muscle pain  Breast: negative for breast lumps, nipple discharge, galactorrhea  Skin :negative for itching, rash, hives  Neuro: negative for dizziness, headache, confusion, weakness  Psych: negative for anxiety, depression, change in mood  Heme/lymph: negative for bleeding, bruising, pallor    Physical Exam    Visit Vitals  /88 (BP 1 Location: Left upper arm, BP Patient Position: Sitting)   Ht 5' 2\" (1.575 m)   Wt 343 lb (155.6 kg)   BMI 62.74 kg/m²       Constitutional  · Appearance: well-nourished, well developed, alert, in no acute distress    HENT  · Head and Face: appears normal    Neck  · Inspection/Palpation: normal appearance, no masses or tenderness  · Lymph Nodes: no lymphadenopathy present  · Thyroid: gland size normal, nontender, no nodules or masses present on palpation    Chest  · Respiratory Effort: non-labored breathing  · Auscultation: CTAB, normal breath sounds    Cardiovascular  · Heart:  · Auscultation: regular rate and rhythm without murmur  · Extremities: no peripheral edema    Breasts  · Inspection of Breasts: breasts symmetrical, no skin changes, no discharge present, nipple appearance normal, no skin retraction present  · Palpation of Breasts and Axillae: no masses present on palpation, no breast tenderness  · Axillary Lymph Nodes: no lymphadenopathy present    Gastrointestinal  · Abdominal Examination: abdomen non-tender to palpation, normal bowel sounds, no masses present  · Liver and spleen: no hepatomegaly present, spleen not palpable  · Hernias: no hernias identified    Genitourinary  · External Genitalia: normal appearance for age, no discharge present, no tenderness present, no inflammatory lesions present, no masses present, no atrophy present  · Vagina: normal vaginal vault without central or paravaginal defects, no discharge present, no inflammatory lesions present, no masses present  · Bladder: non-tender to palpation  · Urethra: appears normal  · Cervix: normal   · Uterus: normal size, shape and consistency  · Adnexa: no adnexal tenderness present, nonpalpable  · Perineum: perineum within normal limits, no evidence of trauma, no rashes or skin lesions present    Skin  · General Inspection: no rash, no lesions identified    Neurologic/Psychiatric  · Mental Status:  · Orientation: grossly oriented to person, place and time  · Mood and Affect: mood normal, affect appropriate      Assessment/Plan:  26 y.o. presenting for annual exam. Overall doing well. Well woman exam:  Normal gynecologic and breast exams. Healthy habits and lifestyle reviewed. Pap UTD 2019. Patient accepts STD screening. Contraception and menstrual regulation - patient opts for cont OCPs for now - rx Junel sent, pt takes continuously. Discussed possible IUD insertion.         Sue Villa MD

## 2021-02-05 ENCOUNTER — OFFICE VISIT (OUTPATIENT)
Dept: OBGYN CLINIC | Age: 26
End: 2021-02-05
Payer: COMMERCIAL

## 2021-02-05 VITALS
DIASTOLIC BLOOD PRESSURE: 88 MMHG | BODY MASS INDEX: 53.92 KG/M2 | SYSTOLIC BLOOD PRESSURE: 132 MMHG | HEIGHT: 62 IN | WEIGHT: 293 LBS

## 2021-02-05 DIAGNOSIS — Z11.3 SCREEN FOR STD (SEXUALLY TRANSMITTED DISEASE): ICD-10-CM

## 2021-02-05 DIAGNOSIS — Z01.419 ENCOUNTER FOR GYNECOLOGICAL EXAMINATION WITHOUT ABNORMAL FINDING: Primary | ICD-10-CM

## 2021-02-05 PROCEDURE — 99395 PREV VISIT EST AGE 18-39: CPT | Performed by: OBSTETRICS & GYNECOLOGY

## 2021-02-05 RX ORDER — NORETHINDRONE ACETATE AND ETHINYL ESTRADIOL 1.5-30(21)
1 KIT ORAL DAILY
Qty: 3 PACKAGE | Refills: 4 | Status: SHIPPED | OUTPATIENT
Start: 2021-02-05 | End: 2021-08-09 | Stop reason: SDUPTHER

## 2021-02-05 NOTE — PATIENT INSTRUCTIONS
Well Visit, Ages 25 to 48: Care Instructions Your Care Instructions Physical exams can help you stay healthy. Your doctor has checked your overall health and may have suggested ways to take good care of yourself. He or she also may have recommended tests. At home, you can help prevent illness with healthy eating, regular exercise, and other steps. Follow-up care is a key part of your treatment and safety. Be sure to make and go to all appointments, and call your doctor if you are having problems. It's also a good idea to know your test results and keep a list of the medicines you take. How can you care for yourself at home? · Reach and stay at a healthy weight. This will lower your risk for many problems, such as obesity, diabetes, heart disease, and high blood pressure. · Get at least 30 minutes of physical activity on most days of the week. Walking is a good choice. You also may want to do other activities, such as running, swimming, cycling, or playing tennis or team sports. Discuss any changes in your exercise program with your doctor. · Do not smoke or allow others to smoke around you. If you need help quitting, talk to your doctor about stop-smoking programs and medicines. These can increase your chances of quitting for good. · Talk to your doctor about whether you have any risk factors for sexually transmitted infections (STIs). Having one sex partner (who does not have STIs and does not have sex with anyone else) is a good way to avoid these infections. · Use birth control if you do not want to have children at this time. Talk with your doctor about the choices available and what might be best for you. · Protect your skin from too much sun. When you're outdoors from 10 a.m. to 4 p.m., stay in the shade or cover up with clothing and a hat with a wide brim. Wear sunglasses that block UV rays. Even when it's cloudy, put broad-spectrum sunscreen (SPF 30 or higher) on any exposed skin.  
· See a dentist one or two times a year for checkups and to have your teeth cleaned. · Wear a seat belt in the car. Follow your doctor's advice about when to have certain tests. These tests can spot problems early. For everyone · Cholesterol. Have the fat (cholesterol) in your blood tested after age 21. Your doctor will tell you how often to have this done based on your age, family history, or other things that can increase your risk for heart disease. · Blood pressure. Have your blood pressure checked during a routine doctor visit. Your doctor will tell you how often to check your blood pressure based on your age, your blood pressure results, and other factors. · Vision. Talk with your doctor about how often to have a glaucoma test. 
· Diabetes. Ask your doctor whether you should have tests for diabetes. · Colon cancer. Your risk for colorectal cancer gets higher as you get older. Some experts say that adults should start regular screening at age 48 and stop at age 76. Others say to start before age 48 or continue after age 76. Talk with your doctor about your risk and when to start and stop screening. For women · Breast exam and mammogram. Talk to your doctor about when you should have a clinical breast exam and a mammogram. Medical experts differ on whether and how often women under 50 should have these tests. Your doctor can help you decide what is right for you. · Cervical cancer screening test and pelvic exam. Begin with a Pap test at age 24. The test often is part of a pelvic exam. Starting at age 27, you may choose to have a Pap test, an HPV test, or both tests at the same time (called co-testing). Talk with your doctor about how often to have testing. · Tests for sexually transmitted infections (STIs). Ask whether you should have tests for STIs. You may be at risk if you have sex with more than one person, especially if your partners do not wear condoms. For men · Tests for sexually transmitted infections (STIs). Ask whether you should have tests for STIs. You may be at risk if you have sex with more than one person, especially if you do not wear a condom. · Testicular cancer exam. Ask your doctor whether you should check your testicles regularly. · Prostate exam. Talk to your doctor about whether you should have a blood test (called a PSA test) for prostate cancer. Experts differ on whether and when men should have this test. Some experts suggest it if you are older than 39 and are -American or have a father or brother who got prostate cancer when he was younger than 72. When should you call for help? Watch closely for changes in your health, and be sure to contact your doctor if you have any problems or symptoms that concern you. Where can you learn more? Go to http://www.wdae.com/ Enter P072 in the search box to learn more about \"Well Visit, Ages 25 to 48: Care Instructions. \" Current as of: May 27, 2020               Content Version: 12.6 © 2006-2020 [employer]. Care instructions adapted under license by [employer] (which disclaims liability or warranty for this information). If you have questions about a medical condition or this instruction, always ask your healthcare professional. [place] [address] any warranty or liability for your use of this information.

## 2021-02-11 ENCOUNTER — TELEPHONE (OUTPATIENT)
Dept: OBGYN CLINIC | Age: 26
End: 2021-02-11

## 2021-02-11 LAB
C TRACH RRNA SPEC QL NAA+PROBE: NEGATIVE
N GONORRHOEA RRNA SPEC QL NAA+PROBE: NEGATIVE
SPECIMEN STATUS REPORT, ROLRST: NORMAL
T VAGINALIS DNA SPEC QL NAA+PROBE: NEGATIVE

## 2021-02-11 NOTE — TELEPHONE ENCOUNTER
Patient of Marilin Briseno FE 1.5/30mcg ,29    Having trouble getting her birth control from the pharmacy. She is supposed to start the new pack Sunday and they say it will be too soon to refill. I spoke with pharmacy and they are requesting for RL to write for 21 count instead of 28 count pack. Please advise. [facility] [address]      Nurse please call patient when problem corrected.

## 2021-02-12 RX ORDER — NORETHINDRONE ACETATE AND ETHINYL ESTRADIOL 1.5-30(21)
1 KIT ORAL DAILY
Qty: 3 PACKAGE | Refills: 4 | Status: SHIPPED | OUTPATIENT
Start: 2021-02-12 | End: 2021-08-09 | Stop reason: SDUPTHER

## 2021-02-12 NOTE — TELEPHONE ENCOUNTER
Medication Problem     Love, Jose Flowers MD  You 15 hours ago (4:27 PM)     That is fine, please send in new rx    Message text

## 2021-04-13 ENCOUNTER — TELEPHONE (OUTPATIENT)
Dept: OBGYN CLINIC | Age: 26
End: 2021-04-13

## 2021-04-14 RX ORDER — VALACYCLOVIR HYDROCHLORIDE 500 MG/1
TABLET, FILM COATED ORAL
Qty: 90 TAB | Refills: 4 | Status: SHIPPED | OUTPATIENT
Start: 2021-04-14 | End: 2022-05-16

## 2021-04-14 NOTE — TELEPHONE ENCOUNTER
26year old patient last seen in the office on 2/5/2021 for annual exam    ? Jorje Pickard to refill has pended till next ae due 2/2022    Please amend/sign      Thank you

## 2021-07-11 NOTE — PROGRESS NOTES
+Trichomonas. Please notify patient and send Rx for Flagyl 2g PO x 1 dose to pt's pharmacy. Please advise partner treatment, and no intercourse until 2 weeks after all partners treated. Please review safe sexual practices. Thanks! No

## 2021-08-09 ENCOUNTER — OFFICE VISIT (OUTPATIENT)
Dept: FAMILY MEDICINE CLINIC | Age: 26
End: 2021-08-09
Payer: COMMERCIAL

## 2021-08-09 VITALS
SYSTOLIC BLOOD PRESSURE: 118 MMHG | WEIGHT: 293 LBS | DIASTOLIC BLOOD PRESSURE: 78 MMHG | HEIGHT: 62 IN | OXYGEN SATURATION: 100 % | RESPIRATION RATE: 18 BRPM | HEART RATE: 100 BPM | BODY MASS INDEX: 53.92 KG/M2

## 2021-08-09 DIAGNOSIS — E88.81 METABOLIC SYNDROME: ICD-10-CM

## 2021-08-09 DIAGNOSIS — J20.9 ACUTE BRONCHITIS, UNSPECIFIED ORGANISM: ICD-10-CM

## 2021-08-09 DIAGNOSIS — Z00.00 WELL WOMAN EXAM (NO GYNECOLOGICAL EXAM): Primary | ICD-10-CM

## 2021-08-09 DIAGNOSIS — E28.2 PCOS (POLYCYSTIC OVARIAN SYNDROME): ICD-10-CM

## 2021-08-09 DIAGNOSIS — R21 RASH AND NONSPECIFIC SKIN ERUPTION: ICD-10-CM

## 2021-08-09 LAB
COMMENT, HOLDF: NORMAL
SAMPLES BEING HELD,HOLD: NORMAL

## 2021-08-09 PROCEDURE — 99395 PREV VISIT EST AGE 18-39: CPT | Performed by: FAMILY MEDICINE

## 2021-08-09 PROCEDURE — 99213 OFFICE O/P EST LOW 20 MIN: CPT | Performed by: FAMILY MEDICINE

## 2021-08-09 RX ORDER — GUAIFENESIN AND DEXTROMETHORPHAN HYDROBROMIDE 1200; 60 MG/1; MG/1
1 TABLET, EXTENDED RELEASE ORAL
Qty: 30 TABLET | Refills: 0 | Status: SHIPPED | OUTPATIENT
Start: 2021-08-09 | End: 2022-04-01 | Stop reason: ALTCHOICE

## 2021-08-09 RX ORDER — AZITHROMYCIN 250 MG/1
TABLET, FILM COATED ORAL
Qty: 6 TABLET | Refills: 0 | Status: SHIPPED | OUTPATIENT
Start: 2021-08-09 | End: 2022-04-01 | Stop reason: ALTCHOICE

## 2021-08-09 RX ORDER — HYDROCORTISONE 25 MG/G
CREAM TOPICAL 2 TIMES DAILY
Qty: 30 G | Refills: 2 | Status: SHIPPED | OUTPATIENT
Start: 2021-08-09

## 2021-08-09 RX ORDER — EPINEPHRINE 0.3 MG/.3ML
0.3 INJECTION SUBCUTANEOUS
Qty: 2 SYRINGE | Refills: 1 | Status: SHIPPED | OUTPATIENT
Start: 2021-08-09 | End: 2021-12-13 | Stop reason: SDUPTHER

## 2021-08-09 NOTE — PATIENT INSTRUCTIONS
Learning About Cervical Cancer Screening  What is a cervical cancer screening test?     The cervix is the lower part of the uterus that opens into the vagina. Cervical cancer screening tests check the cells on the cervix for changes that could lead to cancer. Two tests can be used to screen for cervical cancer. They may be used alone or together. A Pap test.   This test looks for changes in the cells of the cervix. Some of these cell changes could lead to cancer. A human papillomavirus (HPV) test.   This test looks for the HPV virus. Some high-risk types of HPV can cause cell changes that could lead to cervical cancer. When should you have a screening test?  If you have a cervix, you may need cervical cancer screening. Screening will depend on many things. Ages 24 to 34  Screening options for these ages include:  · A Pap test. If your results are normal, you can wait 3 years to have another test.  · An HPV test beginning at age 22. If your results are negative, you can wait 5 years to have another test.  Ages 27 to 59  Screening options for these ages include:  · A Pap test. If your results are normal, you can wait 3 years to have another test.  · An HPV test. If your results are negative, you can wait 5 years to have another test.  · A Pap test and an HPV test. If your results are normal, you can wait 5 years to be tested again. Ages 72 and older  If you are age 72 or older and you've always had normal screening results, you may not need screening. Talk to your doctor. What do the results of cervical cancer screening mean? Your test results may be normal. Or the results may show minor or serious changes to the cells on your cervix. Minor changes may go away on their own, especially if you are younger than 30. You may have an abnormal test because you have an infection of the vagina or cervix or because you have low estrogen levels after menopause that are causing the cells to change.   If you have a high-risk type of human papillomavirus (HPV) or cell changes that could turn into cancer, you may need more tests. Your doctor may suggest that you wait to be retested. Or you may need to have a colposcopy or treatment right away. Your doctor will recommend a follow-up plan based on your results and your age. Follow-up care is a key part of your treatment and safety. Be sure to make and go to all appointments, and call your doctor if you are having problems. It's also a good idea to know your test results and keep a list of the medicines you take. Where can you learn more? Go to http://www.[employer].com/  Enter P919 in the search box to learn more about \"Learning About Cervical Cancer Screening. \"  Current as of: December 17, 2020               Content Version: 12.8  © 5174-0710 [employer]. Care instructions adapted under license by [employer] (which disclaims liability or warranty for this information). If you have questions about a medical condition or this instruction, always ask your healthcare professional. [address] any warranty or liability for your use of this information.

## 2021-08-09 NOTE — PROGRESS NOTES
HISTORY OF PRESENT ILLNESS  Alvaro Antonio is a 26 y.o. female. Rash of the neck  Started few weeks ago not better tried alcohol washing and OTC antibiotic ointments, dosenot tingles and not pain full, states that is notexpanding red, and not  swelled up, whitish patches rounds, with itchiness    ++sore throat  Congestion got vaccinated , no  Cough nicley vaccinated      patient presents for follow-up regarding the anxiety and panic states of mind, for which the patient gets sudden periods of intense tingling sensation of the left arm and legs,  include palpitations, sweating, shaking, shortness of breath, numbness, and then pt feels like a feeling that something bad is going to happen. Pt states that these sometimes happens within minutes, usually  they last for about few min  but each time duration varies from seconds to hours, then worsens by a fear of losing control and getting chest pain thinking that is either heart related or into a stroke level,   Pt not on any Etoh nor illicit drug never abused any prescription medications currently have to tablets left from previous prescription, patient is not asthmatic and has no hx of sudden death, nor early hx of heart dz in the family but recently lost few family member due to Covid-19, requesting a refill for this serious medical condition        Current Outpatient Medications   Medication Sig Dispense Refill    valACYclovir (VALTREX) 500 mg tablet TAKE 1 TABLET BY MOUTH DAILY FOR SUPPRESSION THERAPY. FOR OUTBREAKS TAKE 1 TABLET BY 2X DAILY 3 DAYS 90 Tab 4    Junel FE 1.5/30, 28, 1.5 mg-30 mcg (21)/75 mg (7) tab TAKE 1 TABLET BY MOUTH EVERY DAY 1 Package 1    EPINEPHrine (EPIPEN) 0.3 mg/0.3 mL injection 0.3 mL by IntraMUSCular route once as needed for up to 1 dose. 2 Syringe 1    albuterol (PROVENTIL HFA, VENTOLIN HFA, PROAIR HFA) 90 mcg/actuation inhaler Take 2 Puffs by inhalation every four (4) hours as needed for Wheezing or Shortness of Breath.  1 Inhaler 1  metFORMIN (GLUCOPHAGE) 1,000 mg tablet Take 1 Tab by mouth two (2) times daily (with meals). 60 Tab 4    ibuprofen (MOTRIN) 600 mg tablet Take 1 Tab by mouth every six (6) hours as needed for Pain. (Patient not taking: Reported on 8/9/2021) 20 Tab 0    famotidine (PEPCID) 20 mg tablet Take 40 mg by mouth as needed. (Patient not taking: Reported on 8/9/2021)      loratadine (CLARITIN) 10 mg tablet Take 1 Tab by mouth daily.  Indications: hives, swelling (Patient not taking: Reported on 8/9/2021) 60 Tab 4     Allergies   Allergen Reactions    Other Plant, Animal, Environmental Runny Nose     Seasonal allergies     Past Medical History:   Diagnosis Date    Asthma     BMI 50.0-59.9, adult ([facility] [address] 2/2/2017    Contact dermatitis and eczema due to cause     Elevated blood sugar level     HSV-2 infection     PCOS (polycystic ovarian syndrome)     Urticaria      Past Surgical History:   Procedure Laterality Date    HX TONSILLECTOMY      AL EXCIS TENDON SHEATH LESN,WRIST/FORE       Family History   Problem Relation Age of Onset    Other Mother         high cholestrol, fibromyalia, lupus    Diabetes Mother     Hypertension Mother    [facility] Elevated Lipids Mother     Other Maternal Grandmother         high blood presssure, heart disease, diabetes    Diabetes Maternal Grandmother     Heart Disease Maternal Grandmother     Hypertension Maternal Grandmother     Elevated Lipids Maternal Grandmother     Heart Attack Maternal Grandfather     No Known Problems Father     Stroke Sister     Alcohol abuse Paternal Grandmother     Arthritis-osteo Paternal Grandfather      Social History     Tobacco Use    Smoking status: Never Smoker    Smokeless tobacco: Never Used   Substance Use Topics    Alcohol use: Yes     Comment: once a week      Lab Results   Component Value Date/Time    WBC 7.1 02/14/2020 12:39 PM    HGB 12.2 02/14/2020 12:39 PM    HCT 39.8 02/14/2020 12:39 PM    PLATELET 698 95/33/4543 12:39 PM    MCV 79 02/14/2020 12:39 PM     Lab Results   Component Value Date/Time    GFR est non- 02/14/2020 12:39 PM    GFR est  02/14/2020 12:39 PM    Creatinine 0.64 02/14/2020 12:39 PM    BUN 7 02/14/2020 12:39 PM    Sodium 139 02/14/2020 12:39 PM    Potassium 4.3 02/14/2020 12:39 PM    Chloride 104 02/14/2020 12:39 PM    CO2 19 (L) 02/14/2020 12:39 PM     Lab Results   Component Value Date/Time    TSH 1.160 02/14/2020 12:39 PM    T4, Free 1.06 08/19/2014 10:17 AM         Review of Systems   Constitutional: Negative for chills and fever. HENT: Negative for congestion and nosebleeds. Eyes: Negative for blurred vision and pain. Respiratory: Negative for cough, shortness of breath and wheezing. Cardiovascular: Negative for chest pain and leg swelling. Gastrointestinal: Negative for constipation, diarrhea, nausea and vomiting. Genitourinary: Negative for dysuria and frequency. Musculoskeletal: Negative for joint pain and myalgias. Skin: Positive for itching and rash. Neurological: Negative for dizziness, loss of consciousness and headaches. Psychiatric/Behavioral: Negative for depression. The patient is nervous/anxious and has insomnia. Physical Exam  Vitals and nursing note reviewed. Constitutional:       Appearance: She is well-developed. HENT:      Head: Normocephalic and atraumatic. Eyes:      Conjunctiva/sclera: Conjunctivae normal.      Pupils: Pupils are equal, round, and reactive to light. Neck:      Thyroid: No thyromegaly. Vascular: No JVD. Cardiovascular:      Rate and Rhythm: Normal rate and regular rhythm. Heart sounds: Normal heart sounds. No murmur heard. No friction rub. No gallop. Pulmonary:      Effort: Pulmonary effort is normal. No respiratory distress. Breath sounds: Normal breath sounds. No stridor. No wheezing or rales. Abdominal:      General: Bowel sounds are normal. There is no distension. Palpations: Abdomen is soft.  There is no mass. Tenderness: There is no abdominal tenderness. Musculoskeletal:         General: No tenderness. Normal range of motion. Lymphadenopathy:      Cervical: No cervical adenopathy. Skin:     Findings: No erythema or rash. Neurological:      Mental Status: She is alert and oriented to person, place, and time. Cranial Nerves: No cranial nerve deficit. Deep Tendon Reflexes: Reflexes are normal and symmetric. Psychiatric:         Behavior: Behavior normal.         ASSESSMENT and PLAN  Diagnoses and all orders for this visit:    1. Well woman exam (no gynecological exam)  Comments:  [V70.0]    2. Metabolic syndrome  -     EPINEPHrine (EPIPEN) 0.3 mg/0.3 mL injection; 0.3 mL by IntraMUSCular route once as needed for Anaphylaxis or Allergic Response for up to 1 dose. -     CBC WITH AUTOMATED DIFF; Future  -     LIPID PANEL; Future  -     HEMOGLOBIN A1C WITH EAG; Future  -     TSH 3RD GENERATION; Future  -     T4, FREE; Future  -     METABOLIC PANEL, COMPREHENSIVE; Future    3. PCOS (polycystic ovarian syndrome)  -     EPINEPHrine (EPIPEN) 0.3 mg/0.3 mL injection; 0.3 mL by IntraMUSCular route once as needed for Anaphylaxis or Allergic Response for up to 1 dose. -     CBC WITH AUTOMATED DIFF; Future  -     LIPID PANEL; Future  -     HEMOGLOBIN A1C WITH EAG; Future  -     TSH 3RD GENERATION; Future  -     T4, FREE; Future  -     METABOLIC PANEL, COMPREHENSIVE; Future    4. BMI 60.0-69.9, adult (HCC)  -     EPINEPHrine (EPIPEN) 0.3 mg/0.3 mL injection; 0.3 mL by IntraMUSCular route once as needed for Anaphylaxis or Allergic Response for up to 1 dose. -     CBC WITH AUTOMATED DIFF; Future  -     LIPID PANEL; Future  -     HEMOGLOBIN A1C WITH EAG; Future  -     TSH 3RD GENERATION; Future  -     T4, FREE; Future  -     METABOLIC PANEL, COMPREHENSIVE; Future    5.  Acute bronchitis, unspecified organism  -     azithromycin (ZITHROMAX) 250 mg tablet; 2 first day then one tab daily till finished  - dextromethorphan-guaiFENesin (Mucinex DM) 60-1,200 mg Tb12; Take 1 Tablet by mouth two (2) times daily as needed for Cough or Congestion. Indications: cold symptoms, cough    6. Rash and nonspecific skin eruption  -     hydrocortisone (HYTONE) 2.5 % topical cream; Apply  to affected area two (2) times a day. use thin layer    Other orders  -     SAMPLES BEING HELD          Subjective:   26 y.o. female for Well Woman Check. Her gyne and breast care is done elsewhere by her Ob-Gyne physician. Current Outpatient Medications   Medication Sig Dispense Refill    hydrocortisone (HYTONE) 2.5 % topical cream Apply  to affected area two (2) times a day. use thin layer 30 g 2    azithromycin (ZITHROMAX) 250 mg tablet 2 first day then one tab daily till finished 6 Tablet 0    dextromethorphan-guaiFENesin (Mucinex DM) 60-1,200 mg Tb12 Take 1 Tablet by mouth two (2) times daily as needed for Cough or Congestion. Indications: cold symptoms, cough 30 Tablet 0    valACYclovir (VALTREX) 500 mg tablet TAKE 1 TABLET BY MOUTH DAILY FOR SUPPRESSION THERAPY. FOR OUTBREAKS TAKE 1 TABLET BY 2X DAILY 3 DAYS 90 Tab 4    Junel FE 1.5/30, 28, 1.5 mg-30 mcg (21)/75 mg (7) tab TAKE 1 TABLET BY MOUTH EVERY DAY 1 Package 1    albuterol (PROVENTIL HFA, VENTOLIN HFA, PROAIR HFA) 90 mcg/actuation inhaler Take 2 Puffs by inhalation every four (4) hours as needed for Wheezing or Shortness of Breath. 1 Inhaler 1    metFORMIN (GLUCOPHAGE) 1,000 mg tablet Take 1 Tab by mouth two (2) times daily (with meals). 60 Tab 4    ibuprofen (MOTRIN) 600 mg tablet Take 1 Tab by mouth every six (6) hours as needed for Pain. (Patient not taking: Reported on 8/9/2021) 20 Tab 0    famotidine (PEPCID) 20 mg tablet Take 40 mg by mouth as needed. (Patient not taking: Reported on 8/9/2021)      loratadine (CLARITIN) 10 mg tablet Take 1 Tab by mouth daily.  Indications: hives, swelling (Patient not taking: Reported on 8/9/2021) 60 Tab 4     Allergies   Allergen Reactions    Other Plant, Animal, Environmental Runny Nose     Seasonal allergies     Past Medical History:   Diagnosis Date    Asthma     BMI 50.0-59.9, adult ([address] 2/2/2017    Contact dermatitis and eczema due to cause     Elevated blood sugar level     HSV-2 infection     PCOS (polycystic ovarian syndrome)     Urticaria      Past Surgical History:   Procedure Laterality Date    HX TONSILLECTOMY      GA EXCIS TENDON SHEATH LESN,WRIST/FORE       Family History   Problem Relation Age of Onset    Other Mother         high cholestrol, fibromyalia, lupus    Diabetes Mother     Hypertension Mother    [facility] Elevated Lipids Mother     Other Maternal Grandmother         high blood presssure, heart disease, diabetes    Diabetes Maternal Grandmother     Heart Disease Maternal Grandmother     Hypertension Maternal Grandmother     Elevated Lipids Maternal Grandmother     Heart Attack Maternal Grandfather     No Known Problems Father     Stroke Sister     Alcohol abuse Paternal Grandmother     Arthritis-osteo Paternal Grandfather      Social History     Tobacco Use    Smoking status: Never Smoker    Smokeless tobacco: Never Used   Substance Use Topics    Alcohol use: Yes     Comment: once a week        Lab Results   Component Value Date/Time    Cholesterol, total 120 08/09/2021 10:40 AM    HDL Cholesterol 49 08/09/2021 10:40 AM    LDL, calculated 58.2 08/09/2021 10:40 AM    Triglyceride 64 08/09/2021 10:40 AM    CHOL/HDL Ratio 2.4 08/09/2021 10:40 AM        ROS: Feeling generally well. No TIA's or unusual headaches, no dysphagia. No prolonged cough. No dyspnea or chest pain on exertion. No abdominal pain, change in bowel habits, black or bloody stools. No urinary tract symptoms. No new or unusual musculoskeletal symptoms. Specific concerns today: as in HPI. Objective: The patient appears well, alert, oriented x 3, in no distress.   Visit Vitals  /78 (BP 1 Location: Left upper arm, BP Patient Position: Sitting, BP Cuff Size: Adult long)   Pulse 100   Resp 18   Ht 5' 2\" (1.575 m)   Wt 343 lb 14.4 oz (156 kg)   LMP 05/11/2021   SpO2 100%   BMI 62.90 kg/m²     ENT normal.  Neck supple. No adenopathy or thyromegaly. JASON. Lungs are clear, good air entry, no wheezes, rhonchi or rales. S1 and S2 normal, no murmurs, regular rate and rhythm. Abdomen soft without tenderness, guarding, mass or organomegaly. Extremities show no edema, normal peripheral pulses. Neurological is normal, no focal findings. Breast and Pelvic exams are deferred. Assessment/Plan:   Well Woman  lose weight, increase physical activity, limit alcohol consumption, follow low fat diet, follow low salt diet  Diagnoses and all orders for this visit:    1. Well woman exam (no gynecological exam)  Comments:  [V70.0]    2. Metabolic syndrome  -     EPINEPHrine (EPIPEN) 0.3 mg/0.3 mL injection; 0.3 mL by IntraMUSCular route once as needed for Anaphylaxis or Allergic Response for up to 1 dose. -     CBC WITH AUTOMATED DIFF; Future  -     LIPID PANEL; Future  -     HEMOGLOBIN A1C WITH EAG; Future  -     TSH 3RD GENERATION; Future  -     T4, FREE; Future  -     METABOLIC PANEL, COMPREHENSIVE; Future    3. PCOS (polycystic ovarian syndrome)  -     EPINEPHrine (EPIPEN) 0.3 mg/0.3 mL injection; 0.3 mL by IntraMUSCular route once as needed for Anaphylaxis or Allergic Response for up to 1 dose. -     CBC WITH AUTOMATED DIFF; Future  -     LIPID PANEL; Future  -     HEMOGLOBIN A1C WITH EAG; Future  -     TSH 3RD GENERATION; Future  -     T4, FREE; Future  -     METABOLIC PANEL, COMPREHENSIVE; Future    4. BMI 60.0-69.9, adult (HCC)  -     EPINEPHrine (EPIPEN) 0.3 mg/0.3 mL injection; 0.3 mL by IntraMUSCular route once as needed for Anaphylaxis or Allergic Response for up to 1 dose. -     CBC WITH AUTOMATED DIFF; Future  -     LIPID PANEL; Future  -     HEMOGLOBIN A1C WITH EAG; Future  -     TSH 3RD GENERATION;  Future  -     T4, FREE; Future  -     METABOLIC PANEL, COMPREHENSIVE; Future    5. Acute bronchitis, unspecified organism  -     azithromycin (ZITHROMAX) 250 mg tablet; 2 first day then one tab daily till finished  -     dextromethorphan-guaiFENesin (Mucinex DM) 60-1,200 mg Tb12; Take 1 Tablet by mouth two (2) times daily as needed for Cough or Congestion. Indications: cold symptoms, cough    6. Rash and nonspecific skin eruption  -     hydrocortisone (HYTONE) 2.5 % topical cream; Apply  to affected area two (2) times a day. use thin layer    Other orders  -     SAMPLES BEING HELD      Follow-up and Dispositions    · Return in about 6 months (around 2/9/2022), or if symptoms worsen or fail to improve.

## 2021-08-09 NOTE — PROGRESS NOTES
Chief Complaint   Patient presents with    Complete Physical     1. Have you been to the ER, urgent care clinic since your last visit? Hospitalized since your last visit? No    2. Have you seen or consulted any other health care providers outside of the [address] [place] [address] since your last visit? Include any pap smears or colon screening. No    . Verified patient with two type of identifiers, c/o rash to face only when in sun.

## 2021-08-09 NOTE — LETTER
NOTIFICATION RETURN TO WORK / SCHOOL            8/9/2021 9:25 AM    Ms. Roni Reaves [id number]      To Whom It May Concern:    Annia Vyas is currently under the care of [facility]. She will be off work today 8/9/2021 due to the illness and she will be able to return to work/school on:     8/9/2021    If there are questions or concerns please have the patient contact our office.       Sincerely,      Mellisa Acuna MD

## 2021-08-12 LAB
ALBUMIN SERPL-MCNC: 3.2 G/DL (ref 3.5–5)
ALBUMIN/GLOB SERPL: 0.8 {RATIO} (ref 1.1–2.2)
ALP SERPL-CCNC: 62 U/L (ref 45–117)
ALT SERPL-CCNC: 18 U/L (ref 12–78)
ANION GAP SERPL CALC-SCNC: 5 MMOL/L (ref 5–15)
AST SERPL-CCNC: 10 U/L (ref 15–37)
BASOPHILS # BLD: 0 K/UL (ref 0–0.1)
BASOPHILS NFR BLD: 1 % (ref 0–1)
BILIRUB SERPL-MCNC: 0.3 MG/DL (ref 0.2–1)
BUN SERPL-MCNC: 7 MG/DL (ref 6–20)
BUN/CREAT SERPL: 10 (ref 12–20)
CALCIUM SERPL-MCNC: 8.9 MG/DL (ref 8.5–10.1)
CHLORIDE SERPL-SCNC: 109 MMOL/L (ref 97–108)
CHOLEST SERPL-MCNC: 120 MG/DL
CO2 SERPL-SCNC: 26 MMOL/L (ref 21–32)
CREAT SERPL-MCNC: 0.67 MG/DL (ref 0.55–1.02)
DIFFERENTIAL METHOD BLD: ABNORMAL
EOSINOPHIL # BLD: 0.1 K/UL (ref 0–0.4)
EOSINOPHIL NFR BLD: 1 % (ref 0–7)
ERYTHROCYTE [DISTWIDTH] IN BLOOD BY AUTOMATED COUNT: 15.2 % (ref 11.5–14.5)
EST. AVERAGE GLUCOSE BLD GHB EST-MCNC: 114 MG/DL
GLOBULIN SER CALC-MCNC: 3.9 G/DL (ref 2–4)
GLUCOSE SERPL-MCNC: 85 MG/DL (ref 65–100)
HBA1C MFR BLD: 5.6 % (ref 4–5.6)
HCT VFR BLD AUTO: 40.9 % (ref 35–47)
HDLC SERPL-MCNC: 49 MG/DL
HDLC SERPL: 2.4 {RATIO} (ref 0–5)
HGB BLD-MCNC: 12.1 G/DL (ref 11.5–16)
IMM GRANULOCYTES # BLD AUTO: 0 K/UL (ref 0–0.04)
IMM GRANULOCYTES NFR BLD AUTO: 0 % (ref 0–0.5)
LDLC SERPL CALC-MCNC: 58.2 MG/DL (ref 0–100)
LYMPHOCYTES # BLD: 2.9 K/UL (ref 0.8–3.5)
LYMPHOCYTES NFR BLD: 39 % (ref 12–49)
MCH RBC QN AUTO: 24.4 PG (ref 26–34)
MCHC RBC AUTO-ENTMCNC: 29.6 G/DL (ref 30–36.5)
MCV RBC AUTO: 82.6 FL (ref 80–99)
MONOCYTES # BLD: 0.4 K/UL (ref 0–1)
MONOCYTES NFR BLD: 5 % (ref 5–13)
NEUTS SEG # BLD: 4.1 K/UL (ref 1.8–8)
NEUTS SEG NFR BLD: 54 % (ref 32–75)
NRBC # BLD: 0 K/UL (ref 0–0.01)
NRBC BLD-RTO: 0 PER 100 WBC
PLATELET # BLD AUTO: 343 K/UL (ref 150–400)
PMV BLD AUTO: 11.1 FL (ref 8.9–12.9)
POTASSIUM SERPL-SCNC: 4.6 MMOL/L (ref 3.5–5.1)
PROT SERPL-MCNC: 7.1 G/DL (ref 6.4–8.2)
RBC # BLD AUTO: 4.95 M/UL (ref 3.8–5.2)
SODIUM SERPL-SCNC: 140 MMOL/L (ref 136–145)
T4 FREE SERPL-MCNC: 1.1 NG/DL (ref 0.8–1.5)
TRIGL SERPL-MCNC: 64 MG/DL (ref ?–150)
TSH SERPL DL<=0.05 MIU/L-ACNC: 1.29 UIU/ML (ref 0.36–3.74)
VLDLC SERPL CALC-MCNC: 12.8 MG/DL
WBC # BLD AUTO: 7.6 K/UL (ref 3.6–11)

## 2021-10-11 ENCOUNTER — TELEPHONE (OUTPATIENT)
Dept: OBGYN CLINIC | Age: 26
End: 2021-10-11

## 2021-10-11 RX ORDER — NORETHINDRONE ACETATE AND ETHINYL ESTRADIOL 1.5-30(21)
1 KIT ORAL DAILY
Qty: 84 TABLET | Refills: 1 | Status: SHIPPED | OUTPATIENT
Start: 2021-10-11 | End: 2022-04-01 | Stop reason: SDUPTHER

## 2021-10-11 NOTE — TELEPHONE ENCOUNTER
Call received at 11:15am      26year old patient last seen in the office on 2/5/2021    Patient calling to say that she is not able to get her ocp. Patient was informed by the pharmacy that her medication was discontinued      Prescription sent as per Md order to get patient to when her next ae due 2/2022    Patient verbalized understanding.

## 2021-12-10 ENCOUNTER — PATIENT MESSAGE (OUTPATIENT)
Dept: FAMILY MEDICINE CLINIC | Age: 26
End: 2021-12-10

## 2021-12-10 DIAGNOSIS — E28.2 PCOS (POLYCYSTIC OVARIAN SYNDROME): ICD-10-CM

## 2021-12-10 DIAGNOSIS — E88.81 METABOLIC SYNDROME: ICD-10-CM

## 2021-12-13 RX ORDER — EPINEPHRINE 0.3 MG/.3ML
0.3 INJECTION SUBCUTANEOUS
Qty: 1 EACH | Refills: 11 | Status: SHIPPED | OUTPATIENT
Start: 2021-12-13 | End: 2021-12-13

## 2022-03-03 ENCOUNTER — VIRTUAL VISIT (OUTPATIENT)
Dept: FAMILY MEDICINE CLINIC | Age: 27
End: 2022-03-03
Payer: COMMERCIAL

## 2022-03-03 DIAGNOSIS — J02.9 ACUTE PHARYNGITIS, UNSPECIFIED ETIOLOGY: ICD-10-CM

## 2022-03-03 DIAGNOSIS — Z20.822 SUSPECTED COVID-19 VIRUS INFECTION: Primary | ICD-10-CM

## 2022-03-03 DIAGNOSIS — Z71.85 IMMUNIZATION COUNSELING: ICD-10-CM

## 2022-03-03 DIAGNOSIS — Z02.89 ENCOUNTER TO OBTAIN EXCUSE FROM WORK: ICD-10-CM

## 2022-03-03 DIAGNOSIS — Z71.89 ADVICE GIVEN ABOUT COVID-19 VIRUS INFECTION: ICD-10-CM

## 2022-03-03 PROCEDURE — 99213 OFFICE O/P EST LOW 20 MIN: CPT | Performed by: FAMILY MEDICINE

## 2022-03-03 RX ORDER — ACETAMINOPHEN 500 MG
500 TABLET ORAL
Qty: 30 TABLET | Refills: 0
Start: 2022-03-03 | End: 2022-10-11 | Stop reason: ALTCHOICE

## 2022-03-03 RX ORDER — AZITHROMYCIN 250 MG/1
TABLET, FILM COATED ORAL
Qty: 6 TABLET | Refills: 0 | Status: SHIPPED | OUTPATIENT
Start: 2022-03-03 | End: 2022-04-01 | Stop reason: ALTCHOICE

## 2022-03-03 NOTE — PROGRESS NOTES
HISTORY OF PRESENT ILLNESS  Alvaro Gauthier is a 23 y.o. female. Today's COVID-19 unvaccinated pt main concerns were provided on virtual visit and Video telemed format,  Present on VV for the concern of the current medical conditions,  pt is w/ comorbid history and  the pt does not know if has been exposed to covid-19 individual, and has not been tested yet, currently not working  patient currently have hoarseness having cough mostly dry, does not have shortness of breath and patient is not feeling lightheadedness and Dz for last couple of days,  pt has no low grade fever,  nl smell nl taste, patient also not complaining of some nausea feeling , no vomiting diarrhea, no body ache , and no orbital pain, no rash, patient also states of having a good family support at this time, +sore throat,   Current Outpatient Medications   Medication Sig Dispense Refill    norethindrone-ethinyl estradiol-iron (Junel FE 1.5/30, 28,) 1.5 mg-30 mcg (21)/75 mg (7) tab Take 1 Tablet by mouth daily. 84 Tablet 1    hydrocortisone (HYTONE) 2.5 % topical cream Apply  to affected area two (2) times a day. use thin layer 30 g 2    azithromycin (ZITHROMAX) 250 mg tablet 2 first day then one tab daily till finished 6 Tablet 0    dextromethorphan-guaiFENesin (Mucinex DM) 60-1,200 mg Tb12 Take 1 Tablet by mouth two (2) times daily as needed for Cough or Congestion. Indications: cold symptoms, cough 30 Tablet 0    valACYclovir (VALTREX) 500 mg tablet TAKE 1 TABLET BY MOUTH DAILY FOR SUPPRESSION THERAPY. FOR OUTBREAKS TAKE 1 TABLET BY 2X DAILY 3 DAYS 90 Tab 4    albuterol (PROVENTIL HFA, VENTOLIN HFA, PROAIR HFA) 90 mcg/actuation inhaler Take 2 Puffs by inhalation every four (4) hours as needed for Wheezing or Shortness of Breath.  1 Inhaler 1     Allergies   Allergen Reactions    Other Plant, Animal, Environmental Runny Nose     Seasonal allergies     Past Medical History:   Diagnosis Date    Asthma     BMI 50.0-59.9, adult ([address] 2/2/2017    Contact dermatitis and eczema due to cause     Elevated blood sugar level     HSV-2 infection     PCOS (polycystic ovarian syndrome)     Urticaria      Past Surgical History:   Procedure Laterality Date    HX TONSILLECTOMY      MT EXCIS TENDON SHEATH LESN,WRIST/FORE       Family History   Problem Relation Age of Onset    Other Mother         high cholestrol, fibromyalia, lupus    Diabetes Mother     Hypertension Mother    [place] Elevated Lipids Mother     Other Maternal Grandmother         high blood presssure, heart disease, diabetes    Diabetes Maternal Grandmother     Heart Disease Maternal Grandmother     Hypertension Maternal Grandmother     Elevated Lipids Maternal Grandmother     Heart Attack Maternal Grandfather     No Known Problems Father     Stroke Sister     Alcohol abuse Paternal Grandmother     OSTEOARTHRITIS Paternal Grandfather      Social History     Tobacco Use    Smoking status: Never Smoker    Smokeless tobacco: Never Used   Substance Use Topics    Alcohol use: Yes     Comment: once a week      Lab Results   Component Value Date/Time    Cholesterol, total 120 08/09/2021 10:40 AM    HDL Cholesterol 49 08/09/2021 10:40 AM    LDL, calculated 58.2 08/09/2021 10:40 AM    Triglyceride 64 08/09/2021 10:40 AM    CHOL/HDL Ratio 2.4 08/09/2021 10:40 AM     Lab Results   Component Value Date/Time    ALT (SGPT) 18 08/09/2021 10:40 AM    Alk. phosphatase 62 08/09/2021 10:40 AM    Bilirubin, total 0.3 08/09/2021 10:40 AM    Albumin 3.2 (L) 08/09/2021 10:40 AM    Protein, total 7.1 08/09/2021 10:40 AM    PLATELET 043 67/72/6112 10:40 AM        Review of Systems   Constitutional: Positive for chills. Negative for fever. HENT: Positive for congestion. Negative for nosebleeds. Eyes: Negative for blurred vision and pain. Respiratory: Positive for cough and sputum production. Negative for shortness of breath and wheezing.     Cardiovascular: Negative for chest pain and leg swelling. Gastrointestinal: Negative for constipation, diarrhea, nausea and vomiting. Genitourinary: Negative for dysuria and frequency. Musculoskeletal: Negative for joint pain and myalgias. Skin: Negative for itching and rash. Neurological: Negative for dizziness, loss of consciousness and headaches. Psychiatric/Behavioral: Negative for depression. The patient is not nervous/anxious and does not have insomnia. Physical Exam  Constitutional:       Appearance: She is obese. She is not ill-appearing or toxic-appearing. HENT:      Head: Normocephalic and atraumatic. Mouth/Throat:      Mouth: Mucous membranes are moist.   Neurological:      Mental Status: She is alert and oriented to person, place, and time. Psychiatric:         Mood and Affect: Mood normal.         Behavior: Behavior normal.         Thought Content: Thought content normal.         Judgment: Judgment normal.         ASSESSMENT and PLAN  Diagnoses and all orders for this visit:    1. Suspected COVID-19 virus infection  -     azithromycin (ZITHROMAX) 250 mg tablet; 2 first day then one tab daily till finished    2. Acute pharyngitis, unspecified etiology  -     azithromycin (ZITHROMAX) 250 mg tablet; 2 first day then one tab daily till finished    3. Advice given about COVID-19 virus infection  -     azithromycin (ZITHROMAX) 250 mg tablet; 2 first day then one tab daily till finished    4. Immunization counseling  -     azithromycin (ZITHROMAX) 250 mg tablet; 2 first day then one tab daily till finished    5. Encounter to obtain excuse from work    Other orders  -     acetaminophen (TYLENOL) 500 mg tablet; Take 1 Tablet by mouth every six (6) hours as needed for Pain or Fever.

## 2022-03-03 NOTE — LETTER
NOTIFICATION RETURN TO WORK / SCHOOL    3/3/2022 1:02 PM    Ms. Roni Reaves [id number]      To Whom It May Concern:    Ramon Colindres is currently under the care of [address] [facility]. She will be excused from attending work and school due to been a Covid-19 suspect and she will be able     to return to work/school on: 3/7/2022    If there are questions or concerns please have the patient contact our office.         Sincerely,      Giles Charles MD

## 2022-03-18 PROBLEM — E28.2 PCOS (POLYCYSTIC OVARIAN SYNDROME): Status: ACTIVE | Noted: 2017-01-19

## 2022-03-19 PROBLEM — D75.839 THROMBOCYTOSIS: Status: ACTIVE | Noted: 2017-03-16

## 2022-03-20 PROBLEM — D72.823 LEUKEMOID REACTION: Status: ACTIVE | Noted: 2017-03-16

## 2022-03-31 NOTE — PATIENT INSTRUCTIONS
Well Visit, Ages 25 to 48: Care Instructions  Overview     Well visits can help you stay healthy. Your doctor has checked your overall health and may have suggested ways to take good care of yourself. Your doctor also may have recommended tests. At home, you can help prevent illness with healthy eating, regular exercise, and other steps. Follow-up care is a key part of your treatment and safety. Be sure to make and go to all appointments, and call your doctor if you are having problems. It's also a good idea to know your test results and keep a list of the medicines you take. How can you care for yourself at home? · Get screening tests that you and your doctor decide on. Screening helps find diseases before any symptoms appear. · Eat healthy foods. Choose fruits, vegetables, whole grains, protein, and low-fat dairy foods. Limit fat, especially saturated fat. Reduce salt in your diet. · Limit alcohol. If you are a man, have no more than 2 drinks a day or 14 drinks a week. If you are a woman, have no more than 1 drink a day or 7 drinks a week. · Get at least 30 minutes of physical activity on most days of the week. Walking is a good choice. You also may want to do other activities, such as running, swimming, cycling, or playing tennis or team sports. Discuss any changes in your exercise program with your doctor. · Reach and stay at a healthy weight. This will lower your risk for many problems, such as obesity, diabetes, heart disease, and high blood pressure. · Do not smoke or allow others to smoke around you. If you need help quitting, talk to your doctor about stop-smoking programs and medicines. These can increase your chances of quitting for good. · Care for your mental health. It is easy to get weighed down by worry and stress. Learn strategies to manage stress, like deep breathing and mindfulness, and stay connected with your family and community.  If you find you often feel sad or hopeless, talk with your doctor. Treatment can help. · Talk to your doctor about whether you have any risk factors for sexually transmitted infections (STIs). You can help prevent STIs if you wait to have sex with a new partner (or partners) until you've each been tested for STIs. It also helps if you use condoms (male or female condoms) and if you limit your sex partners to one person who only has sex with you. Vaccines are available for some STIs, such as HPV. · Use birth control if it's important to you to prevent pregnancy. Talk with your doctor about the choices available and what might be best for you. · If you think you may have a problem with alcohol or drug use, talk to your doctor. This includes prescription medicines (such as amphetamines and opioids) and illegal drugs (such as cocaine and methamphetamine). Your doctor can help you figure out what type of treatment is best for you. · Protect your skin from too much sun. When you're outdoors from 10 a.m. to 4 p.m., stay in the shade or cover up with clothing and a hat with a wide brim. Wear sunglasses that block UV rays. Even when it's cloudy, put broad-spectrum sunscreen (SPF 30 or higher) on any exposed skin. · See a dentist one or two times a year for checkups and to have your teeth cleaned. · Wear a seat belt in the car. When should you call for help? Watch closely for changes in your health, and be sure to contact your doctor if you have any problems or symptoms that concern you. Where can you learn more? Go to http://www.placespourtous.com.com/  Enter P072 in the search box to learn more about \"Well Visit, Ages 25 to 48: Care Instructions. \"  Current as of: October 6, 2021               Content Version: 13.2  © 3382-6131 [employer], [employer]. Care instructions adapted under license by [employer] (which disclaims liability or warranty for this information).  If you have questions about a medical condition or this instruction, always ask your healthcare professional. [place] [address] any warranty or liability for your use of this information.

## 2022-03-31 NOTE — PROGRESS NOTES
Annual exam    Juma Moon is a 23 y.o. presenting for annual exam. Her main concerns today include wellness screening. She is overall doing well on Junel FE 1.5/30, using placebo pills to have a period once every 3 months. 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 Recently accepted to [employer]! Plans to study psychology. She declines a chaperone during the gynecologic exam today.      Ob/Gyn Hx:  G0  LMP - late Jan/early Feb  Menses - q 3 months  Contraception - Junel FE 1.5/30  STI - HSV 2, hx trich, accepts screening today  SA - not currently    Health maintenance:  Pap - 3/1/19 NIL  Gardasil - 3/3      Past Medical History:   Diagnosis Date    Asthma     BMI 50.0-59.9, adult ([facility] [address] 2/2/2017    Contact dermatitis and eczema due to cause     Elevated blood sugar level     HSV-2 infection     PCOS (polycystic ovarian syndrome)     Urticaria        Past Surgical History:   Procedure Laterality Date    HX TONSILLECTOMY      UT EXCIS TENDON SHEATH LESN,WRIST/FORE         Family History   Problem Relation Age of Onset    Other Mother         high cholestrol, fibromyalia, lupus    Diabetes Mother     Hypertension Mother     Elevated Lipids Mother     Other Maternal Grandmother         high blood presssure, heart disease, diabetes    Diabetes Maternal Grandmother     Heart Disease Maternal Grandmother     Hypertension Maternal Grandmother     Elevated Lipids Maternal Grandmother     Heart Attack Maternal Grandfather     No Known Problems Father     Stroke Sister     Alcohol abuse Paternal Grandmother     OSTEOARTHRITIS Paternal Grandfather        Social History     Socioeconomic History    Marital status: SINGLE     Spouse name: Not on file    Number of children: Not on file    Years of education: Not on file    Highest education level: Not on file   Occupational History    Not on file   Tobacco Use    Smoking status: Never Smoker    Smokeless tobacco: Never Used   Vaping Use    Vaping Use: Never used   Substance and Sexual Activity    Alcohol use: Yes     Comment: once a week    Drug use: No    Sexual activity: Not Currently     Partners: Male     Birth control/protection: Pill   Other Topics Concern    Not on file   Social History Narrative    ** Merged History Encounter **     MALIA Del Rosario Interested in [employer]. Works at [employer] 10/2018     Social Determinants of Health     Financial Resource Strain:     Difficulty of Paying Living Expenses: Not on file   Food Insecurity:     Worried About [address] [employer] in the Last Year: Not on file    [place] of Food in the Last Year: Not on file   Transportation Needs:     Lack of Transportation (Medical): Not on file    Lack of Transportation (Non-Medical): Not on file   Physical Activity:     Days of Exercise per Week: Not on file    Minutes of Exercise per Session: Not on file   Stress:     Feeling of Stress : Not on file   Social Connections:     Frequency of Communication with Friends and Family: Not on file    Frequency of Social Gatherings with Friends and Family: Not on file    Attends Mandaen Services: Not on file    Active Member of [address] [place] [address] or Organizations: Not on file    Attends Club or Organization Meetings: Not on file    Marital Status: Not on file   Intimate Partner Violence:     Fear of Current or Ex-Partner: Not on file    Emotionally Abused: Not on file    Physically Abused: Not on file    Sexually Abused: Not on file   Housing Stability:     Unable to Pay for Housing in the Last Year: Not on file    Number of [place] in the Last Year: Not on file    Unstable Housing in the Last Year: Not on file       Current Outpatient Medications   Medication Sig Dispense Refill    norethindrone-ethinyl estradiol-iron (Junel FE 1.5/30, 28,) 1.5 mg-30 mcg (21)/75 mg (7) tab Take 1 Tablet by mouth daily.  84 Tablet 4    hydrocortisone (HYTONE) 2.5 % topical cream Apply  to affected area two (2) times a day. use thin layer 30 g 2    valACYclovir (VALTREX) 500 mg tablet TAKE 1 TABLET BY MOUTH DAILY FOR SUPPRESSION THERAPY. FOR OUTBREAKS TAKE 1 TABLET BY 2X DAILY 3 DAYS 90 Tab 4    albuterol (PROVENTIL HFA, VENTOLIN HFA, PROAIR HFA) 90 mcg/actuation inhaler Take 2 Puffs by inhalation every four (4) hours as needed for Wheezing or Shortness of Breath. 1 Inhaler 1    acetaminophen (TYLENOL) 500 mg tablet Take 1 Tablet by mouth every six (6) hours as needed for Pain or Fever.  (Patient not taking: Reported on 4/1/2022) 30 Tablet 0       Allergies   Allergen Reactions    Other Plant, Animal, Environmental Runny Nose     Seasonal allergies       Review of Systems - History obtained from the patient  Constitutional: negative for weight loss, fever, night sweats  HEENT: negative for hearing loss, earache, congestion, snoring, sorethroat  CV: negative for chest pain, palpitations, edema  Resp: negative for cough, shortness of breath, wheezing  GI: negative for change in bowel habits, abdominal pain, black or bloody stools  : negative for frequency, dysuria, hematuria, vaginal discharge  MSK: negative for back pain, joint pain, muscle pain  Breast: negative for breast lumps, nipple discharge, galactorrhea  Skin :negative for itching, rash, hives  Neuro: negative for dizziness, headache, confusion, weakness  Psych: negative for anxiety, depression, change in mood  Heme/lymph: negative for bleeding, bruising, pallor    Physical Exam    Visit Vitals  BP (!) 142/92 (BP 1 Location: Left arm)   Ht 5' 2\" (1.575 m)   Wt 335 lb (152 kg)   BMI 61.27 kg/m²       Constitutional  · Appearance: well-nourished, well developed, alert, in no acute distress    HENT  · Head and Face: appears normal    Neck  · Inspection/Palpation: normal appearance, no masses or tenderness  · Lymph Nodes: no lymphadenopathy present  · Thyroid: gland size normal, nontender, no nodules or masses present on palpation    Chest  · Respiratory Effort: non-labored breathing  · Auscultation: CTAB, normal breath sounds    Cardiovascular  · Heart:  · Auscultation: regular rate and rhythm without murmur  · Extremities: no peripheral edema    Breasts  · Inspection of Breasts: breasts symmetrical, no skin changes, no discharge present, nipple appearance normal, no skin retraction present  · Palpation of Breasts and Axillae: no masses present on palpation, no breast tenderness  · Axillary Lymph Nodes: no lymphadenopathy present    Gastrointestinal  · Abdominal Examination: abdomen non-tender to palpation, normal bowel sounds, no masses present  · Liver and spleen: no hepatomegaly present, spleen not palpable  · Hernias: no hernias identified    Genitourinary  · External Genitalia: normal appearance for age, no discharge present, no tenderness present, no inflammatory lesions present, no masses present, no atrophy present  · Vagina: normal vaginal vault without central or paravaginal defects, no discharge present, no inflammatory lesions present, no masses present  · Bladder: non-tender to palpation  · Urethra: appears normal  · Cervix: normal *use long spec  · Uterus: normal size, shape and consistency  · Adnexa: no adnexal tenderness present, nonpalpable  · Perineum: perineum within normal limits, no evidence of trauma, no rashes or skin lesions present    Skin  · General Inspection: no rash, no lesions identified    Neurologic/Psychiatric  · Mental Status:  · Orientation: grossly oriented to person, place and time  · Mood and Affect: mood normal, affect appropriate      Assessment/Plan:  23 y.o. presenting for annual exam. Overall doing well. Well woman exam:  Normal gynecologic and breast exams. Healthy habits and lifestyle reviewed. Pap today. Patient accepts pelvic STD screening. Contraception and menstrual regulation - patient opts for cont OCPs for now - rx Junel sent, pt takes continuously.        Leandro Travis, MD

## 2022-04-01 ENCOUNTER — OFFICE VISIT (OUTPATIENT)
Dept: OBGYN CLINIC | Age: 27
End: 2022-04-01
Payer: COMMERCIAL

## 2022-04-01 VITALS
DIASTOLIC BLOOD PRESSURE: 92 MMHG | HEIGHT: 62 IN | BODY MASS INDEX: 53.92 KG/M2 | WEIGHT: 293 LBS | SYSTOLIC BLOOD PRESSURE: 142 MMHG

## 2022-04-01 DIAGNOSIS — Z01.419 ENCOUNTER FOR GYNECOLOGICAL EXAMINATION WITHOUT ABNORMAL FINDING: Primary | ICD-10-CM

## 2022-04-01 PROCEDURE — 99395 PREV VISIT EST AGE 18-39: CPT | Performed by: OBSTETRICS & GYNECOLOGY

## 2022-04-01 RX ORDER — NORETHINDRONE ACETATE AND ETHINYL ESTRADIOL 1.5-30(21)
1 KIT ORAL DAILY
Qty: 84 TABLET | Refills: 4 | Status: SHIPPED | OUTPATIENT
Start: 2022-04-01 | End: 2022-06-13

## 2022-04-06 LAB
C TRACH RRNA CVX QL NAA+PROBE: NEGATIVE
CYTOLOGIST CVX/VAG CYTO: NORMAL
CYTOLOGY CVX/VAG DOC CYTO: NORMAL
CYTOLOGY CVX/VAG DOC THIN PREP: NORMAL
DX ICD CODE: NORMAL
LABCORP, 190119: NORMAL
Lab: NORMAL
N GONORRHOEA RRNA CVX QL NAA+PROBE: NEGATIVE
OTHER STN SPEC: NORMAL
STAT OF ADQ CVX/VAG CYTO-IMP: NORMAL
T VAGINALIS RRNA SPEC QL NAA+PROBE: NEGATIVE

## 2022-05-15 ENCOUNTER — TELEPHONE (OUTPATIENT)
Dept: OBGYN CLINIC | Age: 27
End: 2022-05-15

## 2022-05-16 RX ORDER — VALACYCLOVIR HYDROCHLORIDE 500 MG/1
TABLET, FILM COATED ORAL
Qty: 90 TABLET | Refills: 4 | Status: SHIPPED | OUTPATIENT
Start: 2022-05-16

## 2022-05-16 NOTE — TELEPHONE ENCOUNTER
23year old patient last seen in the office on 4/1/2022 for ae    Rx requested last sent on 4/14/2021      Rx pended for amend/sign/refusal    Please advise    Thank you

## 2022-06-13 ENCOUNTER — TELEPHONE (OUTPATIENT)
Dept: OBGYN CLINIC | Age: 27
End: 2022-06-13

## 2022-06-13 RX ORDER — NORETHINDRONE ACETATE AND ETHINYL ESTRADIOL 1.5-30(21)
KIT ORAL
Qty: 84 TABLET | Refills: 4 | Status: SHIPPED | OUTPATIENT
Start: 2022-06-13

## 2022-06-13 NOTE — TELEPHONE ENCOUNTER
Prescription sent as been sent by MD     Mother Torres Slider verified    Mother advised of MD sent prescription to her pharmacy and verbalized understanding.

## 2022-06-13 NOTE — TELEPHONE ENCOUNTER
PHILIPPEA verified to speak to mother regarding her daughter and her medication    Mother is calling to say that her daughter is taking the medication continuously and skipping the placed thor pills and needs to have the signature changed on the prescription    rx pended with the change    Please amend./sign/refuse

## 2022-10-11 ENCOUNTER — OFFICE VISIT (OUTPATIENT)
Dept: FAMILY MEDICINE CLINIC | Age: 27
End: 2022-10-11
Payer: COMMERCIAL

## 2022-10-11 VITALS
HEIGHT: 62 IN | DIASTOLIC BLOOD PRESSURE: 86 MMHG | OXYGEN SATURATION: 97 % | TEMPERATURE: 98.3 F | WEIGHT: 293 LBS | BODY MASS INDEX: 53.92 KG/M2 | HEART RATE: 91 BPM | SYSTOLIC BLOOD PRESSURE: 134 MMHG | RESPIRATION RATE: 17 BRPM

## 2022-10-11 DIAGNOSIS — Z00.00 WELL WOMAN EXAM (NO GYNECOLOGICAL EXAM): Primary | ICD-10-CM

## 2022-10-11 DIAGNOSIS — Z23 NEEDS FLU SHOT: ICD-10-CM

## 2022-10-11 DIAGNOSIS — E88.81 METABOLIC SYNDROME: ICD-10-CM

## 2022-10-11 DIAGNOSIS — Z23 ENCOUNTER FOR IMMUNIZATION: ICD-10-CM

## 2022-10-11 DIAGNOSIS — Z71.85 IMMUNIZATION COUNSELING: ICD-10-CM

## 2022-10-11 DIAGNOSIS — R10.33 PERIUMBILICAL ABDOMINAL PAIN: ICD-10-CM

## 2022-10-11 PROCEDURE — 99395 PREV VISIT EST AGE 18-39: CPT | Performed by: FAMILY MEDICINE

## 2022-10-11 PROCEDURE — 90686 IIV4 VACC NO PRSV 0.5 ML IM: CPT | Performed by: FAMILY MEDICINE

## 2022-10-11 RX ORDER — DOXYCYCLINE 100 MG/1
100 CAPSULE ORAL 2 TIMES DAILY
Qty: 20 CAPSULE | Refills: 0 | Status: SHIPPED | OUTPATIENT
Start: 2022-10-11 | End: 2022-10-21

## 2022-10-11 NOTE — PATIENT INSTRUCTIONS
Pap Test: Care Instructions  Overview     The Pap test (also called a Pap smear) is a screening test for cancer of the cervix, which is the lower part of the uterus that opens into the vagina. The test can help your doctor find early changes in the cells that could lead to cancer. The sample of cells taken during your test has been sent to a lab so that an expert can look at the cells. It usually takes a week or two to get the results back. Follow-up care is a key part of your treatment and safety. Be sure to make and go to all appointments, and call your doctor if you are having problems. It's also a good idea to know your test results and keep a list of the medicines you take. What do the results mean? A normal result means that the test did not find any abnormal cells in the sample. An abnormal result can mean many things. Most of these are not cancer. The results of your test may be abnormal because: You have an infection of the vagina or cervix, such as a yeast infection. You have an IUD (intrauterine device for birth control). You have low estrogen levels after menopause that are causing the cells to change. You have cell changes that may be a sign of precancer or cancer. The results are ranked based on how serious the changes might be. There are many other reasons why you might not get a normal result. If the results were abnormal, you may need to get another test within a few weeks or months. If the results show changes that could be a sign of cancer, you may need a test called a colposcopy, which provides a more complete view of the cervix. Sometimes the lab cannot use the sample because it does not contain enough cells or was not preserved well. If so, you may need to have the test again. This is not common, but it does happen from time to time. When should you call for help?   Watch closely for changes in your health, and be sure to contact your doctor if:    You have vaginal bleeding or pain for more than 2 days after the test. It is normal to have a small amount of bleeding for a day or two after the test.   Where can you learn more? Go to http://www.gray.com/  Enter T021 in the search box to learn more about \"Pap Test: Care Instructions. \"  Current as of: September 8, 2021               Content Version: 13.2  © 9179-7526 [employer]. Care instructions adapted under license by [employer] (which disclaims liability or warranty for this information). If you have questions about a medical condition or this instruction, always ask your healthcare professional. [place] [address] any warranty or liability for your use of this information. Vaccine Information Statement    Influenza (Flu) Vaccine (Inactivated or Recombinant): What You Need to Know    Many vaccine information statements are available in [place] and other languages. See www.immunize.org/vis. Hojas de información sobre vacunas están disponibles en español y en muchos otros idiomas. Visite www.immunize.org/vis. 1. Why get vaccinated? Influenza vaccine can prevent influenza (flu). Flu is a contagious disease that spreads around the [place] every year, usually between October and May. Anyone can get the flu, but it is more dangerous for some people. Infants and young children, people 72 years and older, pregnant people, and people with certain health conditions or a weakened immune system are at greatest risk of flu complications. Pneumonia, bronchitis, sinus infections, and ear infections are examples of flu-related complications. If you have a medical condition, such as heart disease, cancer, or diabetes, flu can make it worse. Flu can cause fever and chills, sore throat, muscle aches, fatigue, cough, headache, and runny or stuffy nose. Some people may have vomiting and diarrhea, though this is more common in children than adults.      In an average year, thousands of people in the [facility] die from flu, and many more are hospitalized. Flu vaccine prevents millions of illnesses and flu-related visits to the doctor each year. 2. Influenza vaccines     CDC recommends everyone 6 months and older get vaccinated every flu season. Children 6 months through 6years of age may need 2 doses during a single flu season. Everyone else needs only 1 dose each flu season. It takes about 2 weeks for protection to develop after vaccination. There are many flu viruses, and they are always changing. Each year a new flu vaccine is made to protect against the influenza viruses believed to be likely to cause disease in the upcoming flu season. Even when the vaccine doesnt exactly match these viruses, it may still provide some protection. Influenza vaccine does not cause flu. Influenza vaccine may be given at the same time as other vaccines. 3. Talk with your health care provider    Tell your vaccination provider if the person getting the vaccine:  Has had an allergic reaction after a previous dose of influenza vaccine, or has any severe, life-threatening allergies   Has ever had Guillain-Barré Syndrome (also called GBS)    In some cases, your health care provider may decide to postpone influenza vaccination until a future visit. Influenza vaccine can be administered at any time during pregnancy. People who are or will be pregnant during influenza season should receive inactivated influenza vaccine. People with minor illnesses, such as a cold, may be vaccinated. People who are moderately or severely ill should usually wait until they recover before getting influenza vaccine. Your health care provider can give you more information. 4. Risks of a vaccine reaction    Soreness, redness, and swelling where the shot is given, fever, muscle aches, and headache can happen after influenza vaccination.   There may be a very small increased risk of Guillain-Barré Syndrome (GBS) after inactivated influenza vaccine (the flu shot). Libert[place] Cooley children who get the flu shot along with pneumococcal vaccine (PCV13) and/or DTaP vaccine at the same time might be slightly more likely to have a seizure caused by fever. Tell your health care provider if a child who is getting flu vaccine has ever had a seizure. People sometimes faint after medical procedures, including vaccination. Tell your provider if you feel dizzy or have vision changes or ringing in the ears. As with any medicine, there is a very remote chance of a vaccine causing a severe allergic reaction, other serious injury, or death. 5. What if there is a serious problem? An allergic reaction could occur after the vaccinated person leaves the clinic. If you see signs of a severe allergic reaction (hives, swelling of the face and throat, difficulty breathing, a fast heartbeat, dizziness, or weakness), call 9-1-1 and get the person to the nearest hospital.    For other signs that concern you, call your health care provider. Adverse reactions should be reported to the Vaccine Adverse Event Reporting System (VAERS). Your health care provider will usually file this report, or you can do it yourself. Visit the VAERS website at www.vaers. hhs.gov or call 7-914.329.5314. VAERS is only for reporting reactions, and VAERS staff members do not give medical advice. 6. The National Vaccine Injury Compensation Program    The [facility] Vaccine Injury Compensation Program (VICP) is a federal program that was created to compensate people who may have been injured by certain vaccines. Claims regarding alleged injury or death due to vaccination have a time limit for filing, which may be as short as two years. Visit the VICP website at www.hrsa.gov/vaccinecompensation or call 7-836.597.9966 to learn about the program and about filing a claim. 7. How can I learn more? Ask your health care provider.    Call your local or state health department. Visit the website of the Food and Drug Administration (FDA) for vaccine package inserts and additional information at www.fda.gov/vaccines-blood-biologics/vaccines. Contact the Centers for Disease Control and Prevention (CDC): Call 8-975.318.4192 (1-800-CDC-INFO) or  Visit CDCs influenza website at www.cdc.gov/flu. Vaccine Information Statement   Inactivated Influenza Vaccine   8/6/2021  42 BROWN Soriano [id number]   Department of Health and Human Services  Centers for Disease Control and Prevention    Office Use Only

## 2022-10-11 NOTE — PROGRESS NOTES
Chief Complaint   Patient presents with    Follow-up     Checkd iron levels, because she was struggling when it got real cold. Look at her stomach discharge coming out navel area   After obtaining consent, and per orders of Dr. Jurgen Negrete, injection of Influenza  given by Isis Devi. Patient instructed to remain in clinic for 20 minutes afterwards, and to report any adverse reaction to me immediately. 1. \"Have you been to the ER, urgent care clinic since your last visit? Hospitalized since your last visit? \" No    2. \"Have you seen or consulted any other health care providers outside of the [address] [place] [address] since your last visit? \" Yes Where: Dr. Evin Marie      3. For patients aged 39-70: Has the patient had a colonoscopy / FIT/ Cologuard? NA - based on age      If the patient is female:    4. For patients aged 41-77: Has the patient had a mammogram within the past 2 years? NA - based on age or sex      11. For patients aged 21-65: Has the patient had a pap smear?  Yes - no Care Gap present 04/2022    Health Maintenance Due   Topic Date Due    Hepatitis C Screening  Never done    COVID-19 Vaccine (2 - Booster for Jewels series) 05/18/2021    Flu Vaccine (1) 08/01/2022    Depression Screen  08/09/2022

## 2022-10-11 NOTE — PROGRESS NOTES
Subjective:   23 y.o. female for Well Woman Check. Her gyne and breast care is done elsewhere by her Ob-Gyne physician. Current Outpatient Medications   Medication Sig Dispense Refill    norethindrone-ethinyl estradiol-iron (Junel FE 1.5/30, 28,) 1.5 mg-30 mcg (21)/75 mg (7) tab Please take one tab by mouth daily skipping the placebo pills, taking active pills continuously 84 Tablet 4    valACYclovir (VALTREX) 500 mg tablet TAKE 1 TABLET BY MOUTH DAILY FOR SUPPRESSION THERAPY. FOR OUTBREAKS TAKE 1 TABLET BY 2X DAILY 3 DAYS 90 Tablet 4    hydrocortisone (HYTONE) 2.5 % topical cream Apply  to affected area two (2) times a day. use thin layer 30 g 2    albuterol (PROVENTIL HFA, VENTOLIN HFA, PROAIR HFA) 90 mcg/actuation inhaler Take 2 Puffs by inhalation every four (4) hours as needed for Wheezing or Shortness of Breath.  1 Inhaler 1     Allergies   Allergen Reactions    Other Plant, Animal, Environmental Runny Nose     Seasonal allergies     Past Medical History:   Diagnosis Date    Asthma     BMI 50.0-59.9, adult ([facility] [address] 2/2/2017    Contact dermatitis and eczema due to cause     Elevated blood sugar level     HSV-2 infection     PCOS (polycystic ovarian syndrome)     Urticaria      Past Surgical History:   Procedure Laterality Date    HX TONSILLECTOMY      [place] EXCIS TENDON SHEATH LESN,WRIST/FORE       Family History   Problem Relation Age of Onset    Other Mother         high cholestrol, fibromyalia, lupus    Diabetes Mother     Hypertension Mother     Elevated Lipids Mother     Other Maternal Grandmother         high blood presssure, heart disease, diabetes    Diabetes Maternal Grandmother     Heart Disease Maternal Grandmother     Hypertension Maternal Grandmother     Elevated Lipids Maternal Grandmother     Heart Attack Maternal Grandfather     No Known Problems Father     Stroke Sister     Alcohol abuse Paternal Grandmother     OSTEOARTHRITIS Paternal Grandfather      Social History     Tobacco Use    Smoking status: Never    Smokeless tobacco: Never   Substance Use Topics    Alcohol use: Yes     Comment: once a week        Lab Results   Component Value Date/Time    WBC 7.6 08/09/2021 10:40 AM    HGB 12.1 08/09/2021 10:40 AM    HCT 40.9 08/09/2021 10:40 AM    PLATELET 564 12/68/6753 10:40 AM    MCV 82.6 08/09/2021 10:40 AM     Lab Results   Component Value Date/Time    Hemoglobin A1c 5.6 08/09/2021 10:40 AM    Hemoglobin A1c 5.5 02/14/2020 12:39 PM    Hemoglobin A1c 5.9 (H) 04/28/2015 11:56 AM    Glucose 85 08/09/2021 10:40 AM    Glucose (POC) 67 01/29/2017 05:25 PM    LDL, calculated 58.2 08/09/2021 10:40 AM    Creatinine 0.67 08/09/2021 10:40 AM         ROS: Feeling generally well. No TIA's or unusual headaches, no dysphagia. No prolonged cough. No dyspnea or chest pain on exertion. No abdominal pain, change in bowel habits, black or bloody stools. No urinary tract symptoms. No new or unusual musculoskeletal symptoms. Specific concerns today: Rash on the umblical,   Started few days ago not better, the patient has tried alcohol washing and OTC antibiotic ointments,  no family member have the same problem, it does tingles and it is pain full, states that is expanding red, and is swelled up, like a lump     Objective: The patient appears well, alert, oriented x 3, in no distress. Visit Vitals  /86 (BP 1 Location: Left upper arm, BP Patient Position: Sitting, BP Cuff Size: Thigh)   Pulse 91   Temp 98.3 °F (36.8 °C) (Oral)   Resp 17   Ht 5' 2\" (1.575 m)   Wt 347 lb (157.4 kg)   SpO2 97%   BMI 63.47 kg/m²     ENT normal.  Neck supple. No adenopathy or thyromegaly. JASON. Lungs are clear, good air entry, no wheezes, rhonchi or rales. S1 and S2 normal, no murmurs, regular rate and rhythm. Abdomen soft without tenderness, guarding, mass or organomegaly. Extremities show no edema, normal peripheral pulses. Neurological is normal, no focal findings. Breast and Pelvic exams are deferred.     Assessment/Plan: Well Woman  lose weight, increase physical activity, limit alcohol consumption, follow low fat diet, follow low salt diet    ICD-10-CM ICD-9-CM    1. Well woman exam (no gynecological exam)  Z00.00 V70.0     [V70.0]      2. Encounter for immunization  Z23 V03.89 CANCELED: INFLUENZA, AFLURIA, FLUZONE, (AGE 3 Y+), IM, MDV, 0.5 ML      3. Periumbilical abdominal pain  R10.33 789.05 US ABD COMP      CULTURE, WOUND W GRAM STAIN      CBC W/O DIFF      LIPID PANEL      FERRITIN      METABOLIC PANEL, COMPREHENSIVE      TSH 3RD GENERATION      T4, FREE      HEMOGLOBIN A1C WITH EAG      HEMOGLOBIN A1C WITH EAG      T4, FREE      TSH 3RD GENERATION      METABOLIC PANEL, COMPREHENSIVE      FERRITIN      LIPID PANEL      CBC W/O DIFF      4. Immunization counseling  Z71.85 V65.49 CBC W/O DIFF      LIPID PANEL      FERRITIN      METABOLIC PANEL, COMPREHENSIVE      TSH 3RD GENERATION      T4, FREE      HEMOGLOBIN A1C WITH EAG      HEMOGLOBIN A1C WITH EAG      T4, FREE      TSH 3RD GENERATION      METABOLIC PANEL, COMPREHENSIVE      FERRITIN      LIPID PANEL      CBC W/O DIFF      5. BMI 50.0-59.9, adult (HCC)  Z68.43 V85.43 CBC W/O DIFF      LIPID PANEL      FERRITIN      METABOLIC PANEL, COMPREHENSIVE      TSH 3RD GENERATION      T4, FREE      HEMOGLOBIN A1C WITH EAG      HEMOGLOBIN A1C WITH EAG      T4, FREE      TSH 3RD GENERATION      METABOLIC PANEL, COMPREHENSIVE      FERRITIN      LIPID PANEL      CBC W/O DIFF      6. Needs flu shot  Z23 V04.81 INFLUENZA, FLUARIX, FLULAVAL, FLUZONE (AGE 6 MO+), AFLURIA(AGE 3Y+) IM, PF, 0.5 ML      7.  BMI 60.0-69.9, adult (HCC)  Z68.44 V85.44 CBC W/O DIFF      LIPID PANEL      FERRITIN      METABOLIC PANEL, COMPREHENSIVE      TSH 3RD GENERATION      T4, FREE      HEMOGLOBIN A1C WITH EAG      HEMOGLOBIN A1C WITH EAG      T4, FREE      TSH 3RD GENERATION      METABOLIC PANEL, COMPREHENSIVE      FERRITIN      LIPID PANEL      CBC W/O DIFF      INFLUENZA, FLUARIX, FLULAVAL, FLUZONE (AGE 6 MO+), AFLURIA(AGE 3Y+) IM, PF, 0.5 ML      SAMPLES BEING HELD      8.  Metabolic syndrome  M33.44 277.7 CBC W/O DIFF      LIPID PANEL      FERRITIN      METABOLIC PANEL, COMPREHENSIVE      TSH 3RD GENERATION      T4, FREE      HEMOGLOBIN A1C WITH EAG      HEMOGLOBIN A1C WITH EAG      T4, FREE      TSH 3RD GENERATION      METABOLIC PANEL, COMPREHENSIVE      FERRITIN      LIPID PANEL      CBC W/O DIFF      INFLUENZA, FLUARIX, FLULAVAL, FLUZONE (AGE 6 MO+), AFLURIA(AGE 3Y+) IM, PF, 0.5 ML      SAMPLES BEING HELD        lab results and schedule of future lab studies reviewed with patient  reviewed diet, exercise and weight control

## 2022-10-12 LAB
ALBUMIN SERPL-MCNC: 3.4 G/DL (ref 3.5–5)
ALBUMIN/GLOB SERPL: 0.9 {RATIO} (ref 1.1–2.2)
ALP SERPL-CCNC: 75 U/L (ref 45–117)
ALT SERPL-CCNC: 16 U/L (ref 12–78)
ANION GAP SERPL CALC-SCNC: 4 MMOL/L (ref 5–15)
AST SERPL-CCNC: 13 U/L (ref 15–37)
BILIRUB SERPL-MCNC: 0.3 MG/DL (ref 0.2–1)
BUN SERPL-MCNC: 14 MG/DL (ref 6–20)
BUN/CREAT SERPL: 17 (ref 12–20)
CALCIUM SERPL-MCNC: 9.3 MG/DL (ref 8.5–10.1)
CHLORIDE SERPL-SCNC: 109 MMOL/L (ref 97–108)
CHOLEST SERPL-MCNC: 137 MG/DL
CO2 SERPL-SCNC: 28 MMOL/L (ref 21–32)
COMMENT, HOLDF: NORMAL
CREAT SERPL-MCNC: 0.81 MG/DL (ref 0.55–1.02)
ERYTHROCYTE [DISTWIDTH] IN BLOOD BY AUTOMATED COUNT: 14.9 % (ref 11.5–14.5)
EST. AVERAGE GLUCOSE BLD GHB EST-MCNC: 120 MG/DL
FERRITIN SERPL-MCNC: 78 NG/ML (ref 8–252)
GLOBULIN SER CALC-MCNC: 4 G/DL (ref 2–4)
GLUCOSE SERPL-MCNC: 89 MG/DL (ref 65–100)
HBA1C MFR BLD: 5.8 % (ref 4–5.6)
HCT VFR BLD AUTO: 44.3 % (ref 35–47)
HDLC SERPL-MCNC: 57 MG/DL
HDLC SERPL: 2.4 {RATIO} (ref 0–5)
HGB BLD-MCNC: 12.9 G/DL (ref 11.5–16)
LDLC SERPL CALC-MCNC: 65.6 MG/DL (ref 0–100)
MCH RBC QN AUTO: 24.5 PG (ref 26–34)
MCHC RBC AUTO-ENTMCNC: 29.1 G/DL (ref 30–36.5)
MCV RBC AUTO: 84.1 FL (ref 80–99)
NRBC # BLD: 0 K/UL (ref 0–0.01)
NRBC BLD-RTO: 0 PER 100 WBC
PLATELET # BLD AUTO: 334 K/UL (ref 150–400)
PMV BLD AUTO: 11.6 FL (ref 8.9–12.9)
POTASSIUM SERPL-SCNC: 4.9 MMOL/L (ref 3.5–5.1)
PROT SERPL-MCNC: 7.4 G/DL (ref 6.4–8.2)
RBC # BLD AUTO: 5.27 M/UL (ref 3.8–5.2)
SAMPLES BEING HELD,HOLD: NORMAL
SODIUM SERPL-SCNC: 141 MMOL/L (ref 136–145)
T4 FREE SERPL-MCNC: 1 NG/DL (ref 0.8–1.5)
TRIGL SERPL-MCNC: 72 MG/DL (ref ?–150)
TSH SERPL DL<=0.05 MIU/L-ACNC: 1.24 UIU/ML (ref 0.36–3.74)
VLDLC SERPL CALC-MCNC: 14.4 MG/DL
WBC # BLD AUTO: 7.3 K/UL (ref 3.6–11)

## 2022-10-14 LAB
BACTERIA SPEC CULT: NORMAL
GRAM STN SPEC: NORMAL
SERVICE CMNT-IMP: NORMAL

## 2022-10-17 ENCOUNTER — HOSPITAL ENCOUNTER (OUTPATIENT)
Dept: ULTRASOUND IMAGING | Age: 27
Discharge: HOME | End: 2022-10-17
Attending: FAMILY MEDICINE
Payer: COMMERCIAL

## 2022-10-17 DIAGNOSIS — R10.33 PERIUMBILICAL ABDOMINAL PAIN: ICD-10-CM

## 2022-10-17 PROCEDURE — 76700 US EXAM ABDOM COMPLETE: CPT

## 2022-10-28 ENCOUNTER — OFFICE VISIT (OUTPATIENT)
Dept: FAMILY MEDICINE CLINIC | Age: 27
End: 2022-10-28
Payer: COMMERCIAL

## 2022-10-28 VITALS
TEMPERATURE: 98.9 F | SYSTOLIC BLOOD PRESSURE: 136 MMHG | WEIGHT: 293 LBS | OXYGEN SATURATION: 99 % | DIASTOLIC BLOOD PRESSURE: 72 MMHG | HEIGHT: 62 IN | BODY MASS INDEX: 53.92 KG/M2 | HEART RATE: 88 BPM

## 2022-10-28 DIAGNOSIS — R53.83 OTHER FATIGUE: ICD-10-CM

## 2022-10-28 DIAGNOSIS — R53.83 LOW ENERGY: ICD-10-CM

## 2022-10-28 DIAGNOSIS — E88.81 METABOLIC SYNDROME: Primary | ICD-10-CM

## 2022-10-28 DIAGNOSIS — L03.311 CELLULITIS OF ABDOMINAL WALL: ICD-10-CM

## 2022-10-28 PROCEDURE — 99214 OFFICE O/P EST MOD 30 MIN: CPT | Performed by: FAMILY MEDICINE

## 2022-10-28 RX ORDER — PHENTERMINE HYDROCHLORIDE 37.5 MG/1
37.5 TABLET ORAL
Qty: 30 TABLET | Refills: 0 | Status: SHIPPED | OUTPATIENT
Start: 2022-10-28 | End: 2022-11-27

## 2022-10-28 RX ORDER — CEPHALEXIN 500 MG/1
500 CAPSULE ORAL 3 TIMES DAILY
Qty: 21 CAPSULE | Refills: 0 | Status: SHIPPED | OUTPATIENT
Start: 2022-10-28 | End: 2022-11-04

## 2022-10-28 NOTE — PROGRESS NOTES
Chief Complaint   Patient presents with    Follow-up     2 week follow up     1. \"Have you been to the ER, urgent care clinic since your last visit? Hospitalized since your last visit? \" No    2. \"Have you seen or consulted any other health care providers outside of the [address] [place] [address] since your last visit? \" No     3. For patients aged 39-70: Has the patient had a colonoscopy / FIT/ Cologuard? NA - based on age      If the patient is female:    4. For patients aged 41-77: Has the patient had a mammogram within the past 2 years? NA - based on age or sex      11. For patients aged 21-65: Has the patient had a pap smear?  Yes - no Care Gap present

## 2022-10-28 NOTE — PROGRESS NOTES
HISTORY OF PRESENT ILLNESS  Janae Salguero is a 23 y.o. female present  w/ the c/o Rash on the abd area mostly on the umblical aspect of the abdomen,   Started few days ago not better, the patient has tried alcohol washing and OTC antibiotic ointments,  no family member have the same problem, it does tingles and it is pain full, states that is expanding red, and is swelled up, like a lump  Unable to take doxycycline  stated that the ABX made her vomiting and also nauseated,   Not pregnant,  RBC 3.80 - 5.20 M/uL 5.27 High      Hemoglobin A1c 4.0 - 5.6 % 5.8 High      Obesity  Specific concerns today for my pt is the abnormal wt , patient does not do binge drinking no self induced vomiting patient also denies any use of laxative, has tried different available diets,  over-the-counter medications has been trying to be active, and avoiding fast food as much as possible. Does not have an habit of eating too much ,  being on the heavy side for a few yrs, current weight creates a bad body image,      Current Outpatient Medications   Medication Sig Dispense Refill    phentermine (ADIPEX-P) 37.5 mg tablet Take 1 Tablet by mouth every morning for 30 days. Max Daily Amount: 37.5 mg. 30 Tablet 0    cephALEXin (KEFLEX) 500 mg capsule Take 1 Capsule by mouth three (3) times daily for 7 days. 21 Capsule 0    norethindrone-ethinyl estradiol-iron (Junel FE 1.5/30, 28,) 1.5 mg-30 mcg (21)/75 mg (7) tab Please take one tab by mouth daily skipping the placebo pills, taking active pills continuously 84 Tablet 4    valACYclovir (VALTREX) 500 mg tablet TAKE 1 TABLET BY MOUTH DAILY FOR SUPPRESSION THERAPY. FOR OUTBREAKS TAKE 1 TABLET BY 2X DAILY 3 DAYS 90 Tablet 4    hydrocortisone (HYTONE) 2.5 % topical cream Apply  to affected area two (2) times a day.  use thin layer 30 g 2    albuterol (PROVENTIL HFA, VENTOLIN HFA, PROAIR HFA) 90 mcg/actuation inhaler Take 2 Puffs by inhalation every four (4) hours as needed for Wheezing or Shortness of Breath. 1 Inhaler 1     Allergies   Allergen Reactions    Doxycycline Nausea and Vomiting    Other Plant, Animal, Environmental Runny Nose     Seasonal allergies     Past Medical History:   Diagnosis Date    Asthma     BMI 50.0-59.9, adult ([address] 2/2/2017    Contact dermatitis and eczema due to cause     Elevated blood sugar level     HSV-2 infection     PCOS (polycystic ovarian syndrome)     Urticaria      Past Surgical History:   Procedure Laterality Date    HX TONSILLECTOMY      [place] EXCIS TENDON SHEATH LESN,WRIST/FORE       Family History   Problem Relation Age of Onset    Other Mother         high cholestrol, fibromyalia, lupus    Diabetes Mother     Hypertension Mother     Elevated Lipids Mother     Other Maternal Grandmother         high blood presssure, heart disease, diabetes    Diabetes Maternal Grandmother     Heart Disease Maternal Grandmother     Hypertension Maternal Grandmother     Elevated Lipids Maternal Grandmother     Heart Attack Maternal Grandfather     No Known Problems Father     Stroke Sister     Alcohol abuse Paternal Grandmother     OSTEOARTHRITIS Paternal Grandfather      Social History     Tobacco Use    Smoking status: Never    Smokeless tobacco: Never   Substance Use Topics    Alcohol use: Yes     Comment: once a week      Lab Results   Component Value Date/Time    Hemoglobin A1c 5.8 (H) 10/11/2022 09:21 AM    Hemoglobin A1c 5.6 08/09/2021 10:40 AM    Hemoglobin A1c 5.5 02/14/2020 12:39 PM    Glucose 89 10/11/2022 09:21 AM    Glucose (POC) 67 01/29/2017 05:25 PM    LDL, calculated 65.6 10/11/2022 09:21 AM    Creatinine 0.81 10/11/2022 09:21 AM      Lab Results   Component Value Date/Time    Cholesterol, total 137 10/11/2022 09:21 AM    HDL Cholesterol 57 10/11/2022 09:21 AM    LDL, calculated 65.6 10/11/2022 09:21 AM    Triglyceride 72 10/11/2022 09:21 AM    CHOL/HDL Ratio 2.4 10/11/2022 09:21 AM        Review of Systems   Constitutional:  Negative for chills and fever.    HENT:  Negative for congestion and nosebleeds. Eyes:  Negative for blurred vision and pain. Respiratory:  Negative for cough, shortness of breath and wheezing. Cardiovascular:  Negative for chest pain and leg swelling. Gastrointestinal:  Negative for constipation, diarrhea, nausea and vomiting. Genitourinary:  Negative for dysuria and frequency. Musculoskeletal:  Negative for joint pain and myalgias. Skin:  Positive for rash. Negative for itching. Neurological:  Negative for dizziness, loss of consciousness and headaches. Psychiatric/Behavioral:  Negative for depression. The patient is not nervous/anxious and does not have insomnia. Physical Exam  Vitals and nursing note reviewed. Constitutional:       Appearance: She is well-developed. HENT:      Head: Normocephalic and atraumatic. Eyes:      Conjunctiva/sclera: Conjunctivae normal.      Pupils: Pupils are equal, round, and reactive to light. Neck:      Thyroid: No thyromegaly. Vascular: No JVD. Cardiovascular:      Rate and Rhythm: Normal rate and regular rhythm. Heart sounds: Normal heart sounds. No murmur heard. No friction rub. No gallop. Pulmonary:      Effort: Pulmonary effort is normal. No respiratory distress. Breath sounds: Normal breath sounds. No stridor. No wheezing or rales. Abdominal:      General: Bowel sounds are normal. There is no distension. Palpations: Abdomen is soft. There is no mass. Tenderness: There is no abdominal tenderness. Musculoskeletal:         General: No tenderness. Normal range of motion. Lymphadenopathy:      Cervical: No cervical adenopathy. Skin:     Findings: Erythema and rash present. Neurological:      Mental Status: She is alert and oriented to person, place, and time. Cranial Nerves: No cranial nerve deficit. Deep Tendon Reflexes: Reflexes are normal and symmetric.    Psychiatric:         Behavior: Behavior normal.       ASSESSMENT and PLAN    ICD-10-CM ICD-9-CM    1. Metabolic syndrome  A48.15 277.7 phentermine (ADIPEX-P) 37.5 mg tablet      2. BMI 60.0-69.9, adult (HCC)  Z68.44 V85.44 phentermine (ADIPEX-P) 37.5 mg tablet      3. Cellulitis of abdominal wall  L03.311 682.2 cephALEXin (KEFLEX) 500 mg capsule      4. Low energy  R53.83 780.79 cephALEXin (KEFLEX) 500 mg capsule      5.  Other fatigue  R53.83 780.79 cephALEXin (KEFLEX) 500 mg capsule            With risk-benefit analysis, cs med was prescribed and was told to be considering available nonpharmacologic,   Patient was told that the medication is not safe was told not to operate any machinery while taking the medication meanwhile emphasized that the pt should take the medication as needed not on a regular basis avoid alcohol intake and avoid other medication that could potentiate its effect, regardless patient was told any other medication given by any other doctor the pt need to call primary care for further advice`  Signed informed consent,   signed cs treatment agreement,   patient acknowledged understanding and agreed with today's recommendation      lab results and schedule of future lab studies reviewed with patient  reviewed diet, exercise and weight control

## 2022-11-28 ENCOUNTER — OFFICE VISIT (OUTPATIENT)
Dept: FAMILY MEDICINE CLINIC | Age: 27
End: 2022-11-28
Payer: COMMERCIAL

## 2022-11-28 VITALS
OXYGEN SATURATION: 98 % | RESPIRATION RATE: 16 BRPM | TEMPERATURE: 97.8 F | HEART RATE: 72 BPM | BODY MASS INDEX: 64.75 KG/M2 | WEIGHT: 293 LBS

## 2022-11-28 DIAGNOSIS — E88.81 METABOLIC SYNDROME: ICD-10-CM

## 2022-11-28 DIAGNOSIS — R53.83 LOW ENERGY: ICD-10-CM

## 2022-11-28 DIAGNOSIS — R53.83 OTHER FATIGUE: ICD-10-CM

## 2022-11-28 PROCEDURE — 99214 OFFICE O/P EST MOD 30 MIN: CPT | Performed by: FAMILY MEDICINE

## 2022-11-28 RX ORDER — TOPIRAMATE 25 MG/1
25 TABLET ORAL 2 TIMES DAILY
Qty: 30 TABLET | Refills: 2 | Status: SHIPPED | OUTPATIENT
Start: 2022-11-28

## 2022-11-28 RX ORDER — PHENTERMINE HYDROCHLORIDE 37.5 MG/1
37.5 TABLET ORAL
Qty: 30 TABLET | Refills: 0 | Status: SHIPPED | OUTPATIENT
Start: 2022-11-28 | End: 2022-12-28

## 2022-11-28 NOTE — PROGRESS NOTES
Chief Complaint   Patient presents with    Weight Management       1. \"Have you been to the ER, urgent care clinic since your last visit? Hospitalized since your last visit? \" No    2. \"Have you seen or consulted any other health care providers outside of the [address] [place] [address] since your last visit? \" No     3. For patients aged 39-70: Has the patient had a colonoscopy / FIT/ Cologuard? NA - based on age      If the patient is female:    4. For patients aged 41-77: Has the patient had a mammogram within the past 2 years? Yes - no Care Gap present      5. For patients aged 21-65: Has the patient had a pap smear? Yes - no Care Gap present    Health Maintenance Due   Topic Date Due    Hepatitis C Screening  Never done    COVID-19 Vaccine (2 - Booster for Jewels series) 05/18/2021       Verified patient with two type of identifiers.

## 2022-11-28 NOTE — PROGRESS NOTES
HISTORY OF PRESENT ILLNESS  Sun Mcgill is a 23 y.o. female,  present with a concern of obesity state with a BMI of close to 50, patient has been given Adipex for better outcome stating that she is feeling great on the given med, feeling less tired and fatigued, becoming to be more concentrated at work and at home, the pt is becoming to be more active , not a fast fooder, states that the portion are very well controlled, wants to continue on this medication despite its side effects, none noted yet , aware of calling for concern regarding the current meds, never abused any rx meds, no Etoh nor Illicit drug use, pt is keeping the meds in safe place, pt does not feel anxious nor depressed, pt states that the quality of life has been better since on the current med,   Pt wt also has sig decreased since last visit, CAGE answers no's    Patient Active Problem List    Diagnosis Date Noted    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 Max Daily Amount: 37.5 mg. 30 Tablet 0    topiramate (TOPAMAX) 25 mg tablet Take 1 Tablet by mouth two (2) times a day. 30 Tablet 2    norethindrone-ethinyl estradiol-iron (Junel FE 1.5/30, 28,) 1.5 mg-30 mcg (21)/75 mg (7) tab Please take one tab by mouth daily skipping the placebo pills, taking active pills continuously 84 Tablet 4    valACYclovir (VALTREX) 500 mg tablet TAKE 1 TABLET BY MOUTH DAILY FOR SUPPRESSION THERAPY. FOR OUTBREAKS TAKE 1 TABLET BY 2X DAILY 3 DAYS 90 Tablet 4    hydrocortisone (HYTONE) 2.5 % topical cream Apply  to affected area two (2) times a day.  use thin layer 30 g 2    albuterol (PROVENTIL HFA, VENTOLIN HFA, PROAIR HFA) 90 mcg/actuation inhaler Take 2 Puffs by inhalation every four (4) hours as needed for Wheezing or Shortness of Breath. 1 Inhaler 1     Allergies   Allergen Reactions    Doxycycline Nausea and Vomiting    Other Plant, Animal, Environmental Runny Nose     Seasonal allergies     Past Medical History:   Diagnosis Date    Asthma     BMI 50.0-59.9, adult ([address] 2/2/2017    Contact dermatitis and eczema due to cause     Elevated blood sugar level     HSV-2 infection     PCOS (polycystic ovarian syndrome)     Urticaria      Past Surgical History:   Procedure Laterality Date    HX TONSILLECTOMY      TN EXCIS TENDON SHEATH LESN,WRIST/FORE       Family History   Problem Relation Age of Onset    Other Mother         high cholestrol, fibromyalia, lupus    Diabetes Mother     Hypertension Mother     Elevated Lipids Mother     Other Maternal Grandmother         high blood presssure, heart disease, diabetes    Diabetes Maternal Grandmother     Heart Disease Maternal Grandmother     Hypertension Maternal Grandmother     Elevated Lipids Maternal Grandmother     Heart Attack Maternal Grandfather     No Known Problems Father     Stroke Sister     Alcohol abuse Paternal Grandmother     OSTEOARTHRITIS Paternal Grandfather      Social History     Tobacco Use    Smoking status: Never    Smokeless tobacco: Never   Substance Use Topics    Alcohol use: Yes     Comment: once a week      Lab Results   Component Value Date/Time    WBC 7.3 10/11/2022 09:21 AM    HGB 12.9 10/11/2022 09:21 AM    HCT 44.3 10/11/2022 09:21 AM    PLATELET 769 23/81/1638 09:21 AM    MCV 84.1 10/11/2022 09:21 AM     Lab Results   Component Value Date/Time    Hemoglobin A1c 5.8 (H) 10/11/2022 09:21 AM    Hemoglobin A1c 5.6 08/09/2021 10:40 AM    Hemoglobin A1c 5.5 02/14/2020 12:39 PM    Glucose 89 10/11/2022 09:21 AM    Glucose (POC) 67 01/29/2017 05:25 PM    LDL, calculated 65.6 10/11/2022 09:21 AM    Creatinine 0.81 10/11/2022 09:21 AM         Review of Systems   Constitutional:  Positive for malaise/fatigue. Negative for chills and fever. HENT:  Negative for congestion and nosebleeds. Eyes:  Negative for blurred vision and pain. Respiratory:  Negative for cough, shortness of breath and wheezing. Cardiovascular:  Negative for chest pain and leg swelling. Gastrointestinal:  Negative for constipation, diarrhea, nausea and vomiting. Genitourinary:  Negative for dysuria and frequency. Musculoskeletal:  Negative for joint pain and myalgias. Skin:  Negative for itching and rash. Neurological:  Positive for weakness. Negative for dizziness, loss of consciousness and headaches. Psychiatric/Behavioral:  Negative for depression. The patient is not nervous/anxious and does not have insomnia. Physical Exam  Vitals and nursing note reviewed. Constitutional:       Appearance: She is well-developed. HENT:      Head: Normocephalic and atraumatic. Eyes:      Conjunctiva/sclera: Conjunctivae normal.      Pupils: Pupils are equal, round, and reactive to light. Neck:      Thyroid: No thyromegaly. Vascular: No JVD. Cardiovascular:      Rate and Rhythm: Normal rate and regular rhythm. Heart sounds: Normal heart sounds. No murmur heard. No friction rub. No gallop. Pulmonary:      Effort: Pulmonary effort is normal. No respiratory distress. Breath sounds: Normal breath sounds. No stridor. No wheezing or rales. Abdominal:      General: Bowel sounds are normal. There is no distension. Palpations: Abdomen is soft. There is no mass. Tenderness: There is no abdominal tenderness. Musculoskeletal:         General: No tenderness. Normal range of motion. Lymphadenopathy:      Cervical: No cervical adenopathy. Skin:     Findings: No erythema or rash. Neurological:      Mental Status: She is alert and oriented to person, place, and time. Cranial Nerves: No cranial nerve deficit. Deep Tendon Reflexes: Reflexes are normal and symmetric.    Psychiatric:         Behavior: Behavior normal.       ASSESSMENT and PLAN    ICD-10-CM ICD-9-CM    1. BMI 60.0-69.9, adult (HCC)  Z68.44 V85.44 phentermine (ADIPEX-P) 37.5 mg tablet      topiramate (TOPAMAX) 25 mg tablet      2. Other fatigue  R53.83 780.79 phentermine (ADIPEX-P) 37.5 mg tablet      topiramate (TOPAMAX) 25 mg tablet      3. Low energy  R53.83 780.79 phentermine (ADIPEX-P) 37.5 mg tablet      topiramate (TOPAMAX) 25 mg tablet      4. BMI 50.0-59.9, adult (HCC)  Z68.43 V85.43 phentermine (ADIPEX-P) 37.5 mg tablet      topiramate (TOPAMAX) 25 mg tablet      5.  Metabolic syndrome  W67.38 277.7 phentermine (ADIPEX-P) 37.5 mg tablet      topiramate (TOPAMAX) 25 mg tablet        Patient has no contraindication to use this medication at this time,  Nutritional behavioral counseling exercise regimen and calorie and fat restricted diet for the better outcome, decrease the  risk of obesity-associated complications,     lab results and schedule of future lab studies reviewed with patient  reviewed diet, exercise and weight control

## 2023-05-14 ENCOUNTER — APPOINTMENT (OUTPATIENT)
Facility: HOSPITAL | Age: 28
End: 2023-05-14
Payer: COMMERCIAL

## 2023-05-14 ENCOUNTER — HOSPITAL ENCOUNTER (EMERGENCY)
Facility: HOSPITAL | Age: 28
Discharge: HOME | End: 2023-05-14
Attending: STUDENT IN AN ORGANIZED HEALTH CARE EDUCATION/TRAINING PROGRAM | Admitting: STUDENT IN AN ORGANIZED HEALTH CARE EDUCATION/TRAINING PROGRAM
Payer: COMMERCIAL

## 2023-05-14 VITALS
BODY MASS INDEX: 50.02 KG/M2 | SYSTOLIC BLOOD PRESSURE: 105 MMHG | RESPIRATION RATE: 18 BRPM | HEART RATE: 90 BPM | OXYGEN SATURATION: 99 % | TEMPERATURE: 99.5 F | DIASTOLIC BLOOD PRESSURE: 70 MMHG | HEIGHT: 64 IN | WEIGHT: 293 LBS

## 2023-05-14 DIAGNOSIS — S99.921A INJURY OF RIGHT FOOT, INITIAL ENCOUNTER: Primary | ICD-10-CM

## 2023-05-14 PROCEDURE — 99283 EMERGENCY DEPT VISIT LOW MDM: CPT

## 2023-05-14 PROCEDURE — 73630 X-RAY EXAM OF FOOT: CPT

## 2023-05-14 PROCEDURE — 6370000000 HC RX 637 (ALT 250 FOR IP): Performed by: EMERGENCY MEDICINE

## 2023-05-14 RX ORDER — IBUPROFEN 600 MG/1
600 TABLET ORAL
Status: COMPLETED | OUTPATIENT
Start: 2023-05-14 | End: 2023-05-14

## 2023-05-14 RX ADMIN — IBUPROFEN 600 MG: 600 TABLET, FILM COATED ORAL at 17:19

## 2023-05-14 ASSESSMENT — ENCOUNTER SYMPTOMS
COUGH: 0
ABDOMINAL PAIN: 0
BACK PAIN: 0
SORE THROAT: 0
VOMITING: 0
SHORTNESS OF BREATH: 0
DIARRHEA: 0
COLOR CHANGE: 0

## 2023-05-14 ASSESSMENT — PAIN SCALES - GENERAL
PAINLEVEL_OUTOF10: 10
PAINLEVEL_OUTOF10: 10

## 2023-05-14 ASSESSMENT — PAIN DESCRIPTION - ORIENTATION
ORIENTATION: RIGHT
ORIENTATION: RIGHT

## 2023-05-14 ASSESSMENT — PAIN DESCRIPTION - LOCATION
LOCATION: FOOT
LOCATION: FOOT;ANKLE

## 2023-05-14 ASSESSMENT — PAIN DESCRIPTION - DESCRIPTORS: DESCRIPTORS: THROBBING;HEAVINESS

## 2023-10-17 ENCOUNTER — OFFICE VISIT (OUTPATIENT)
Age: 28
End: 2023-10-17
Payer: COMMERCIAL

## 2023-10-17 VITALS
TEMPERATURE: 98.5 F | HEART RATE: 100 BPM | SYSTOLIC BLOOD PRESSURE: 139 MMHG | WEIGHT: 293 LBS | RESPIRATION RATE: 20 BRPM | DIASTOLIC BLOOD PRESSURE: 80 MMHG | OXYGEN SATURATION: 97 % | BODY MASS INDEX: 61.14 KG/M2

## 2023-10-17 DIAGNOSIS — M79.671 FOOT PAIN, RIGHT: ICD-10-CM

## 2023-10-17 PROCEDURE — 99213 OFFICE O/P EST LOW 20 MIN: CPT | Performed by: FAMILY MEDICINE

## 2023-10-17 RX ORDER — ERGOCALCIFEROL 1.25 MG/1
CAPSULE ORAL
COMMUNITY
Start: 2023-08-11

## 2023-10-17 RX ORDER — KETOCONAZOLE 20 MG/ML
SHAMPOO TOPICAL WEEKLY
COMMUNITY
Start: 2022-06-15

## 2023-10-17 RX ORDER — EPINEPHRINE 0.3 MG/.3ML
INJECTION SUBCUTANEOUS
Qty: 2 EACH | Refills: 5 | Status: SHIPPED | OUTPATIENT
Start: 2023-10-17

## 2023-10-17 RX ORDER — SEMAGLUTIDE 0.25 MG/.5ML
0.25 INJECTION, SOLUTION SUBCUTANEOUS
Qty: 0.5 ML | Refills: 1 | Status: SHIPPED | OUTPATIENT
Start: 2023-10-17

## 2023-10-17 RX ORDER — EPINEPHRINE 0.3 MG/.3ML
INJECTION SUBCUTANEOUS
COMMUNITY
Start: 2021-12-16 | End: 2023-10-17 | Stop reason: SDUPTHER

## 2023-10-17 SDOH — ECONOMIC STABILITY: FOOD INSECURITY: WITHIN THE PAST 12 MONTHS, THE FOOD YOU BOUGHT JUST DIDN'T LAST AND YOU DIDN'T HAVE MONEY TO GET MORE.: NEVER TRUE

## 2023-10-17 SDOH — ECONOMIC STABILITY: FOOD INSECURITY: WITHIN THE PAST 12 MONTHS, YOU WORRIED THAT YOUR FOOD WOULD RUN OUT BEFORE YOU GOT MONEY TO BUY MORE.: NEVER TRUE

## 2023-10-17 SDOH — ECONOMIC STABILITY: INCOME INSECURITY: HOW HARD IS IT FOR YOU TO PAY FOR THE VERY BASICS LIKE FOOD, HOUSING, MEDICAL CARE, AND HEATING?: NOT HARD AT ALL

## 2023-10-17 SDOH — ECONOMIC STABILITY: HOUSING INSECURITY
IN THE LAST 12 MONTHS, WAS THERE A TIME WHEN YOU DID NOT HAVE A STEADY PLACE TO SLEEP OR SLEPT IN A SHELTER (INCLUDING NOW)?: NO

## 2023-10-17 ASSESSMENT — PATIENT HEALTH QUESTIONNAIRE - PHQ9
SUM OF ALL RESPONSES TO PHQ QUESTIONS 1-9: 0
SUM OF ALL RESPONSES TO PHQ QUESTIONS 1-9: 0
SUM OF ALL RESPONSES TO PHQ9 QUESTIONS 1 & 2: 0
SUM OF ALL RESPONSES TO PHQ QUESTIONS 1-9: 0
SUM OF ALL RESPONSES TO PHQ QUESTIONS 1-9: 0
2. FEELING DOWN, DEPRESSED OR HOPELESS: 0
1. LITTLE INTEREST OR PLEASURE IN DOING THINGS: 0

## 2023-10-17 NOTE — PROGRESS NOTES
Chief Complaint   Patient presents with    Weight Management    Foot Pain     Wants referral for ortho for right foot pain. 1. Have you been to the ER, urgent care clinic since your last visit? Hospitalized since your last visit? Yes    2. Have you seen or consulted any other health care providers outside of the [address] [place] [address] Avenue since your last visit? Include any pap smears or colon screening.  Yes When: 2023 Where: [facility] Reason for visit: Foot injury      The patient, Jaqueline Sports, identity was verified by name and 
the age  HEENT; normocephalic and atraumatic PERRLA extraocular motor intact normal tympanic membrane normal external ear bilaterally, mucosal normal no drainage  Neck: supple no adenopathy no JVD no bruit  Lungs: Clear to auscultation bilaterally no rales rhonchi or wheezes  Heart: Normal regular S1-S2 ,  no murmur  Breast exam deferred  Abdomen: Soft nontender normal bowel sounds   Extremities: Normal range of motion no point tenderness normal pulses no edema  Pelvic/: No lesion, no lymphadenopathy  Skin: Normal no lesion no rash        Latest Ref Rng & Units 10/11/2022     9:21 AM 8/9/2021    10:40 AM 2/14/2020    12:39 PM   LAB PRIMARY CARE   A1C 4.0 - 5.6 % 5.8  5.6  5.5    A1C POC 4.0 - 5.6 % 5.8  5.6  5.5    GLU random 65 - 100 mg/dL 89  85  79    CHOL <200 MG/  120  131    TRIG <150 MG/DL 72  64  73    HDL MG/DL 57  49  59    LDL CALC 0 - 100 MG/DL 65.6  58.2  57     - 145 mmol/L 141  140  139    K 3.5 - 5.1 mmol/L 4.9  4.6  4.3    BUN 6 - 20 MG/DL 14  7  7    CR 0.55 - 1.02 MG/DL 0.81  0.67  0.64    CA 8.5 - 10.1 MG/DL 9.3  8.9  9.3    ALT 12 - 78 U/L 16  18  8    AST 15 - 37 U/L 13  10  15    TSH 0.36 - 3.74 uIU/mL 1.24  1.29  1.160    HGB 11.5 - 16.0 g/dL 12.9  12.1  12.2        Lab Results   Component Value Date/Time    CHOL 137 10/11/2022 09:21 AM    CHOL 120 08/09/2021 10:40 AM    CHOL 131 02/14/2020 12:39 PM    TRIG 72 10/11/2022 09:21 AM    TRIG 64 08/09/2021 10:40 AM    TRIG 73 02/14/2020 12:39 PM    HDL 57 10/11/2022 09:21 AM    HDL 49 08/09/2021 10:40 AM    HDL 59 02/14/2020 12:39 PM    LDLCALC 65.6 10/11/2022 09:21 AM    LDLCALC 58.2 08/09/2021 10:40 AM    LDLCALC 57 02/14/2020 12:39 PM    GLUCOSE 89 10/11/2022 09:21 AM    LABA1C 5.8 10/11/2022 09:21 AM    LABA1C 5.6 08/09/2021 10:40 AM    LABA1C 5.5 02/14/2020 12:39 PM       The ASCVD Risk score (Francesca JADE, et al., 2019) failed to calculate for the following reasons:     The 2019 ASCVD risk score is only valid for ages 36 to

## 2024-05-15 RX ORDER — NORETHINDRONE ACETATE AND ETHINYL ESTRADIOL 1.5-30(21)
KIT ORAL
Qty: 84 TABLET | Refills: 0 | Status: SHIPPED | OUTPATIENT
Start: 2024-05-15

## 2024-08-12 RX ORDER — NORETHINDRONE ACETATE AND ETHINYL ESTRADIOL 1.5-30(21)
KIT ORAL
Qty: 84 TABLET | Refills: 0 | OUTPATIENT
Start: 2024-08-12

## 2024-10-10 NOTE — PROGRESS NOTES
Edison Sales is a 28 y.o. female returns for an annual exam. Wants to discuss if she should keep taking her birthcontrol.     Chief Complaint   Patient presents with    Annual Exam       Patient's last menstrual period was 09/10/2024. Regular on OCP  Her periods are heavy in flow and  she has them q3mo due to how she takes her BC .  She does not have dysmenorrhea.  Problems:   Birth Control: Junel  Last Pap: 4/1/2022 NIL, STD negative  With regard to the Gardisil vaccine, she received 3 of the 3 injections      1. Have you been to the ER, urgent care clinic, or hospitalized since your last visit? no    2. Have you seen or consulted any other health care providers outside of the [facility] since your last visit? no    Examination chaperoned:      Kesha White LPN.

## 2024-10-11 ENCOUNTER — OFFICE VISIT (OUTPATIENT)
Age: 29
End: 2024-10-11
Payer: COMMERCIAL

## 2024-10-11 VITALS
HEART RATE: 91 BPM | RESPIRATION RATE: 20 BRPM | BODY MASS INDEX: 50.02 KG/M2 | OXYGEN SATURATION: 98 % | TEMPERATURE: 99 F | HEIGHT: 64 IN | DIASTOLIC BLOOD PRESSURE: 83 MMHG | SYSTOLIC BLOOD PRESSURE: 123 MMHG | WEIGHT: 293 LBS

## 2024-10-11 DIAGNOSIS — Z01.419 ENCOUNTER FOR GYNECOLOGICAL EXAMINATION WITHOUT ABNORMAL FINDING: Primary | ICD-10-CM

## 2024-10-11 PROCEDURE — 99395 PREV VISIT EST AGE 18-39: CPT | Performed by: OBSTETRICS & GYNECOLOGY

## 2024-10-11 RX ORDER — NORETHINDRONE ACETATE AND ETHINYL ESTRADIOL 1.5-30(21)
KIT ORAL
Qty: 84 TABLET | Refills: 0 | Status: SHIPPED | OUTPATIENT
Start: 2024-10-11 | End: 2024-10-11

## 2024-10-11 RX ORDER — NORETHINDRONE ACETATE AND ETHINYL ESTRADIOL 1.5-30(21)
KIT ORAL
Qty: 84 TABLET | Refills: 4 | Status: SHIPPED | OUTPATIENT
Start: 2024-10-11

## 2024-10-11 ASSESSMENT — PATIENT HEALTH QUESTIONNAIRE - PHQ9
SUM OF ALL RESPONSES TO PHQ QUESTIONS 1-9: 0
SUM OF ALL RESPONSES TO PHQ QUESTIONS 1-9: 0
SUM OF ALL RESPONSES TO PHQ9 QUESTIONS 1 & 2: 0
SUM OF ALL RESPONSES TO PHQ QUESTIONS 1-9: 0
2. FEELING DOWN, DEPRESSED OR HOPELESS: NOT AT ALL
1. LITTLE INTEREST OR PLEASURE IN DOING THINGS: NOT AT ALL
SUM OF ALL RESPONSES TO PHQ QUESTIONS 1-9: 0

## 2024-10-11 NOTE — PROGRESS NOTES
Annual exam    Edison Sales is a 28 y.o. presenting for annual exam.     She has the following gynecologic concerns today: wellness    Her menses are every 3 months due to the way she is taking her Ocps.   She is using OCPs (Junel) for contraception. Takes continuously for 3 months and then placebo pills.    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      She is up to date on cervical cancer screening.      She declines a chaperone during the gynecologic exam today.     Ob/Gyn Hx:  G 0  LMP - 9/10/24      Past Medical History:   Diagnosis Date    Asthma     BMI 50.0-59.9, adult 2/2/2017    Contact dermatitis and eczema due to cause     Elevated blood sugar level     HSV-2 infection     PCOS (polycystic ovarian syndrome)     Urticaria        Past Surgical History:   Procedure Laterality Date    EXCIS TENDON SHEATH LESN,WRIST/FORE      TONSILLECTOMY         Family History   Problem Relation Age of Onset    Stroke Sister     Heart Disease Maternal Grandmother     Diabetes Maternal Grandmother     Other Maternal Grandmother         high blood presssure, heart disease, diabetes    Elevated Lipids Maternal Grandmother     Hypertension Maternal Grandmother     Elevated Lipids Mother     Hypertension Mother     Diabetes Mother     Other Mother         high cholestrol, fibromyalia, lupus    No Known Problems Father     Heart Attack Maternal Grandfather     Alcohol Abuse Paternal Grandmother     Osteoarthritis Paternal Grandfather        Social History     Socioeconomic History    Marital status: Single     Spouse name: Not on file    Number of children: Not on file    Years of education: Not on file    Highest education level: Not on file   Occupational History    Not on file   Tobacco Use    Smoking status: Never    Smokeless tobacco: Never   Substance and Sexual Activity    Alcohol use: Yes    Drug use: No    Sexual activity: Not on file   Other Topics Concern    Not on file

## 2025-05-29 NOTE — PATIENT INSTRUCTIONS
Report to [facility] nurse for admit to room 318   Shoulder Blade: Exercises  Your Care Instructions  Here are some examples of typical exercises for your condition. Start each exercise slowly. Ease off the exercise if you start to have pain. Your doctor or physical therapist will tell you when you can start these exercises and which ones will work best for you. How to do the exercises  Shoulder roll    1. Stand tall with your chin slightly tucked. Imagine that a string at the top of your head is pulling you straight up. 2. Keep your arms relaxed. All motion will be in your shoulders. 3. Shrug your shoulders up toward your ears, then up and back. Bulan your shoulders down and back, like you're sliding your hands down into your back pants pockets. 4. Repeat the circles at least 2 to 4 times. This exercise is also helpful anytime you want to relax. Lower neck and upper back stretch    1. With your arms about shoulder height, clasp your hands in front of you. 2. Drop your chin toward your chest.  3. Reach straight forward so you are rounding your upper back. Think about pulling your shoulder blades apart. Eulalia Vallejo feel a stretch across your upper back and shoulders. Hold for at least 6 seconds. 4. Repeat 2 to 4 times. Triceps stretch    1. Reach your arm straight up. 2. Keeping your elbow in place, bend your arm and reach your hand down behind your back. 3. With your other hand, apply gentle pressure to the bent elbow. Eulalia Vallejo feel a stretch at the back of your upper arm and shoulder. Hold about 6 seconds. 4. Repeat 2 to 4 times with each arm. Shoulder stretch    1. Relax your shoulders. 2. Raise one arm to shoulder height, and reach it across your chest.  3. Pull the arm slightly toward you with your other arm. This will help you get a gentle stretch. Hold for about 6 seconds. 4. Repeat 2 to 4 times. Shoulder blade squeeze    1. Sit or stand up tall with your arms at your sides. 2. Keep your shoulders relaxed and down, not shrugged.   3. Squeeze your shoulder blades together. Hold for 6 seconds, then relax. 4. Repeat 8 to 12 times. Straight-arm shoulder blade squeeze    1. Sit or stand tall. Relax your shoulders. 2. With palms down, hold your elastic tubing or band straight out in front of you. 3. Start with slight tension in the tubing or band, with your hands about shoulder-width apart. 4. Slowly pull straight out to the sides, squeezing your shoulder blades together. Keep your arms straight and at shoulder height. Slowly release. 5. Repeat 8 to 12 times. Rowing    1. Gilliam your elastic tubing or band at about waist height. Take one end in each hand. 2. Sit or stand with your feet hip-width apart. 3. Hold your arms straight in front of you. Adjust your distance to create slight tension in the tubing or band. 4. Slightly tuck your chin. Relax your shoulders. 5. Without shrugging your shoulders, pull straight back. Your elbows will pass alongside your waist.  Pull-downs    1. Gilliam your elastic tubing or band in the top of a closed door. Take one end in each hand. 2. Either sit or stand, depending on what is more comfortable. If you feel unsteady, sit on a chair. 3. Start with your arms up and comfortably apart, elbows straight. There should be a slight tension in the tubing or band. 4. Slightly tuck your chin, and look straight ahead. 5. Keeping your back straight, slowly pull down and back, bending your elbows. 6. Stop where your hands are level with your chin, in a \"goalpost\" position. 7. Repeat 8 to 12 times. Chest T stretch    1. Lie on your back. Raise your knees so they are bent. Plant your feet on the floor, hip-width apart. 2. Tuck your chin, and relax your shoulders. 3. Reach your arms straight out to the sides. If you don't feel a mild stretch in your shoulders and across your chest, use a foam roll or a tightly rolled blanket under your spine, from your tailbone to your head.   4. Relax in this position for at least 15 to 30 seconds while you breathe normally. Repeat 2 to 4 times. As you get used to this stretch, keep adding a little more time until you are able relax in this position for 2 or 3 minutes. When you can relax for at least 2 minutes, you only need to do the exercise 1 time per session. Chest goalpost stretch    1. Lie on your back. Raise your knees so they are bent. Plant your feet on the floor, hip-width apart. 2. Tuck your chin, and relax your shoulders. 3. Reach your arms straight out to the sides. 4. Bend your arms at the elbows, with your hands pointed toward the top of your head. Your arms should make an L on either side of your head. Your palms should be facing up. 5. If you don't feel a mild stretch in your shoulders and across your chest, use a foam roll or tightly rolled blanket under your spine, from your tailbone to your head. 6. Relax in this position for at least 15 to 30 seconds while you breathe normally. Repeat 2 to 4 times. Each day you do this exercise, add a little more time until you can relax in this position for 2 or 3 minutes. When you can relax for at least 2 minutes, you only need to do the exercise 1 time per session. Follow-up care is a key part of your treatment and safety. Be sure to make and go to all appointments, and call your doctor if you are having problems. It's also a good idea to know your test results and keep a list of the medicines you take. Where can you learn more? Go to http://lois-iris.info/. Enter ([phone] in the search box to learn more about \"Shoulder Blade: Exercises. \"  Current as of: May 23, 2016  Content Version: 11.2  © 5801-4528 [employer]. Care instructions adapted under license by [employer] (which disclaims liability or warranty for this information).  If you have questions about a medical condition or this instruction, always ask your healthcare professional. Sabine Vanessa disclaims any warranty or liability for your use of this information. Rotator Cuff: Exercises  Your Care Instructions  Here are some examples of typical rehabilitation exercises for your condition. Start each exercise slowly. Ease off the exercise if you start to have pain. Your doctor or physical therapist will tell you when you can start these exercises and which ones will work best for you. How to do the exercises  Pendulum swing    Note: If you have pain in your back, do not do this exercise. 5. Hold on to a table or the back of a chair with your good arm. Then bend forward a little and let your sore arm hang straight down. This exercise does not use the arm muscles. Rather, use your legs and your hips to create movement that makes your arm swing freely. 6. Use the movement from your hips and legs to guide the slightly swinging arm back and forth like a pendulum (or elephant trunk). Then guide it in circles that start small (about the size of a dinner plate). Make the circles a bit larger each day, as your pain allows. 7. Do this exercise for 5 minutes, 5 to 7 times each day. 8. As you have less pain, try bending over a little farther to do this exercise. This will increase the amount of movement at your shoulder. Posterior stretching exercise    5. Hold the elbow of your injured arm with your other hand. 6. Use your hand to pull your injured arm gently up and across your body. You will feel a gentle stretch across the back of your injured shoulder. 7. Hold for at least 15 to 30 seconds. Then slowly lower your arm. 8. Repeat 2 to 4 times. Up-the-back stretch    Note: Your doctor or physical therapist may want you to wait to do this stretch until you have regained most of your range of motion and strength. You can do this stretch in different ways. Hold any of these stretches for at least 15 to 30 seconds. Repeat them 2 to 4 times. 5. Put your hand in your back pocket. Let it rest there to stretch your shoulder.   6. With your other hand, hold your injured arm (palm outward) behind your back by the wrist. Pull your arm up gently to stretch your shoulder. 7. Next, put a towel over your other shoulder. Put the hand of your injured arm behind your back. Now hold the back end of the towel. With the other hand, hold the front end of the towel in front of your body. Pull gently on the front end of the towel. This will bring your hand farther up your back to stretch your shoulder. Overhead stretch    5. Standing about an arm's length away, grasp onto a solid surface. You could use a countertop, a doorknob, or the back of a sturdy chair. 6. With your knees slightly bent, bend forward with your arms straight. Lower your upper body, and let your shoulders stretch. 7. As your shoulders are able to stretch farther, you may need to take a step or two backward. 8. Hold for at least 15 to 30 seconds. Then stand up and relax. If you had stepped back during your stretch, step forward so you can keep your hands on the solid surface. 9. Repeat 2 to 4 times. Shoulder flexion (lying down)    Note: To make a wand for this exercise, use a piece of PVC pipe or a broom handle with the broom removed. Make the wand about a foot wider than your shoulders. 5. Lie on your back, holding a wand with both hands. Your palms should face down as you hold the wand. 6. Keeping your elbows straight, slowly raise your arms over your head. Raise them until you feel a stretch in your shoulders, upper back, and chest.  7. Hold for 15 to 30 seconds. 8. Repeat 2 to 4 times. Shoulder rotation (lying down)    Note: To make a wand for this exercise, use a piece of PVC pipe or a broom handle with the broom removed. Make the wand about a foot wider than your shoulders. 6. Lie on your back. Hold a wand with both hands with your elbows bent and palms up. 7. Keep your elbows close to your body, and move the wand across your body toward the sore arm.   8. Hold for 8 to 12 seconds. 9. Repeat 2 to 4 times. Wall climbing (to the side)    Note: Avoid any movement that is straight to your side, and be careful not to arch your back. Your arm should stay about 30 degrees to the front of your side. 6. Stand with your side to a wall so that your fingers can just touch it at an angle about 30 degrees toward the front of your body. 7. Walk the fingers of your injured arm up the wall as high as pain permits. Try not to shrug your shoulder up toward your ear as you move your arm up. 8. Hold that position for a count of at least 15 to 20.  9. Walk your fingers back down to the starting position. 10. Repeat at least 2 to 4 times. Try to reach higher each time. Wall climbing (to the front)    Note: During this stretching exercise, be careful not to arch your back. 8. Face a wall, and stand so your fingers can just touch it. 9. Keeping your shoulder down, walk the fingers of your injured arm up the wall as high as pain permits. (Don't shrug your shoulder up toward your ear.)  10. Hold your arm in that position for at least 15 to 30 seconds. 11. Slowly walk your fingers back down to where you started. 12. Repeat at least 2 to 4 times. Try to reach higher each time. Shoulder blade squeeze    5. Stand with your arms at your sides, and squeeze your shoulder blades together. Do not raise your shoulders up as you squeeze. 6. Hold 6 seconds. 7. Repeat 8 to 12 times. Scapular exercise: Arm reach    7. Lie flat on your back. This exercise is a very slight motion that starts with your arms raised (elbows straight, arms straight). 8. From this position, reach higher toward the felicita or ceiling. Keep your elbows straight. All motion should be from your shoulder blade only. 9. Relax your arms back to where you started. 10. Repeat 8 to 12 times. Arm raise to the side    Note: During this strengthening exercise, your arm should stay about 30 degrees to the front of your side.   1. Slowly raise your injured arm to the side, with your thumb facing up. Raise your arm 60 degrees at the most (shoulder level is 90 degrees). 2. Hold the position for 3 to 5 seconds. Then lower your arm back to your side. If you need to, bring your \"good\" arm across your body and place it under the elbow as you lower your injured arm. Use your good arm to keep your injured arm from dropping down too fast.  3. Repeat 8 to 12 times. 4. When you first start out, don't hold any extra weight in your hand. As you get stronger, you may use a 1-pound to 2-pound dumbbell or a small can of food. Shoulder flexor and extensor exercise    Note: These are isometric exercises. That means you contract your muscles without actually moving. · Push forward (flex): Stand facing a wall or doorjamb, about 6 inches or less back. Hold your injured arm against your body. Make a closed fist with your thumb on top. Then gently push your hand forward into the wall with about 25% to 50% of your strength. Don't let your body move backward as you push. Hold for about 6 seconds. Relax for a few seconds. Repeat 8 to 12 times. · Push backward (extend): Stand with your back flat against a wall. Your upper arm should be against the wall, with your elbow bent 90 degrees (your hand straight ahead). Push your elbow gently back against the wall with about 25% to 50% of your strength. Don't let your body move forward as you push. Hold for about 6 seconds. Relax for a few seconds. Repeat 8 to 12 times. Scapular exercise: Wall push-ups    Note: This exercise is best done with your fingers somewhat turned out, rather than straight up and down. 1. Stand facing a wall, about 12 inches to 18 inches away. 2. Place your hands on the wall at shoulder height. 3. Slowly bend your elbows and bring your face to the wall. Keep your back and hips straight. 4. Push back to where you started. 5. Repeat 8 to 12 times.   6. When you can do this exercise against a wall comfortably, you can try it against a counter. You can then slowly progress to the end of a couch, then to a sturdy chair, and finally to the floor. Scapular exercise: Retraction    Note: For this exercise, you will need elastic exercise material, such as surgical tubing or Thera-Band. 1. Put the band around a solid object at about waist level. (A bedpost will work well.) Each hand should hold an end of the band. 2. With your elbows at your sides and bent to 90 degrees, pull the band back. Your shoulder blades should move toward each other. Then move your arms back where you started. 3. Repeat 8 to 12 times. 4. If you have good range of motion in your shoulders, try this exercise with your arms lifted out to the sides. Keep your elbows at a 90-degree angle. Raise the elastic band up to about shoulder level. Pull the band back to move your shoulder blades toward each other. Then move your arms back where you started. Internal rotator strengthening exercise    1. Start by tying a piece of elastic exercise material to a doorknob. You can use surgical tubing or Thera-Band. 2. Stand or sit with your shoulder relaxed and your elbow bent 90 degrees. Your upper arm should rest comfortably against your side. Squeeze a rolled towel between your elbow and your body for comfort. This will help keep your arm at your side. 3. Hold one end of the elastic band in the hand of the painful arm. 4. Slowly rotate your forearm toward your body until it touches your belly. Slowly move it back to where you started. 5. Keep your elbow and upper arm firmly tucked against the towel roll or at your side. 6. Repeat 8 to 12 times. External rotator strengthening exercise    1. Start by tying a piece of elastic exercise material to a doorknob. You can use surgical tubing or Thera-Band. (You may also hold one end of the band in each hand.)  2. Stand or sit with your shoulder relaxed and your elbow bent 90 degrees.  Your upper arm should rest comfortably against your side. Squeeze a rolled towel between your elbow and your body for comfort. This will help keep your arm at your side. 3. Hold one end of the elastic band with the hand of the painful arm. 4. Start with your forearm across your belly. Slowly rotate the forearm out away from your body. Keep your elbow and upper arm tucked against the towel roll or the side of your body until you begin to feel tightness in your shoulder. Slowly move your arm back to where you started. 5. Repeat 8 to 12 times. Follow-up care is a key part of your treatment and safety. Be sure to make and go to all appointments, and call your doctor if you are having problems. It's also a good idea to know your test results and keep a list of the medicines you take. Where can you learn more? Go to http://lois-iris.info/. Enter Hanny Christian in the search box to learn more about \"Rotator Cuff: Exercises. \"  Current as of: May 23, 2016  Content Version: 11.2  © 8643-6940 [employer]. Care instructions adapted under license by [employer] (which disclaims liability or warranty for this information). If you have questions about a medical condition or this instruction, always ask your healthcare professional. [address] any warranty or liability for your use of this information.

## 2025-05-30 ENCOUNTER — HOSPITAL ENCOUNTER (EMERGENCY)
Facility: HOSPITAL | Age: 30
Discharge: HOME | End: 2025-05-31
Attending: EMERGENCY MEDICINE
Payer: COMMERCIAL

## 2025-05-30 ENCOUNTER — TELEPHONE (OUTPATIENT)
Age: 30
End: 2025-05-30

## 2025-05-30 ENCOUNTER — APPOINTMENT (OUTPATIENT)
Facility: HOSPITAL | Age: 30
End: 2025-05-30
Payer: COMMERCIAL

## 2025-05-30 DIAGNOSIS — J01.00 ACUTE NON-RECURRENT MAXILLARY SINUSITIS: Primary | ICD-10-CM

## 2025-05-30 DIAGNOSIS — R51.9 RIGHT FACIAL PAIN: ICD-10-CM

## 2025-05-30 LAB
ALBUMIN SERPL-MCNC: 3.2 G/DL (ref 3.5–5)
ALBUMIN/GLOB SERPL: 0.7 (ref 1.1–2.2)
ALP SERPL-CCNC: 80 U/L (ref 45–117)
ALT SERPL-CCNC: 20 U/L (ref 12–78)
ANION GAP SERPL CALC-SCNC: 6 MMOL/L (ref 2–12)
AST SERPL-CCNC: 16 U/L (ref 15–37)
BASOPHILS # BLD: 0.03 K/UL (ref 0–0.1)
BASOPHILS NFR BLD: 0.3 % (ref 0–1)
BILIRUB SERPL-MCNC: 0.2 MG/DL (ref 0.2–1)
BUN SERPL-MCNC: 6 MG/DL (ref 6–20)
BUN/CREAT SERPL: 9 (ref 12–20)
CALCIUM SERPL-MCNC: 9.2 MG/DL (ref 8.5–10.1)
CHLORIDE SERPL-SCNC: 108 MMOL/L (ref 97–108)
CO2 SERPL-SCNC: 24 MMOL/L (ref 21–32)
COMMENT:: NORMAL
CREAT SERPL-MCNC: 0.7 MG/DL (ref 0.55–1.02)
DIFFERENTIAL METHOD BLD: ABNORMAL
EOSINOPHIL # BLD: 0.1 K/UL (ref 0–0.4)
EOSINOPHIL NFR BLD: 1.1 % (ref 0–7)
ERYTHROCYTE [DISTWIDTH] IN BLOOD BY AUTOMATED COUNT: 15.2 % (ref 11.5–14.5)
GLOBULIN SER CALC-MCNC: 4.4 G/DL (ref 2–4)
GLUCOSE SERPL-MCNC: 113 MG/DL (ref 65–100)
HCT VFR BLD AUTO: 39.5 % (ref 35–47)
HGB BLD-MCNC: 11.7 G/DL (ref 11.5–16)
IMM GRANULOCYTES # BLD AUTO: 0.03 K/UL (ref 0–0.04)
IMM GRANULOCYTES NFR BLD AUTO: 0.3 % (ref 0–0.5)
LYMPHOCYTES # BLD: 5.26 K/UL (ref 0.8–3.5)
LYMPHOCYTES NFR BLD: 56.2 % (ref 12–49)
MCH RBC QN AUTO: 23.8 PG (ref 26–34)
MCHC RBC AUTO-ENTMCNC: 29.6 G/DL (ref 30–36.5)
MCV RBC AUTO: 80.3 FL (ref 80–99)
MONOCYTES # BLD: 0.66 K/UL (ref 0–1)
MONOCYTES NFR BLD: 7.1 % (ref 5–13)
NEUTS SEG # BLD: 3.28 K/UL (ref 1.8–8)
NEUTS SEG NFR BLD: 35 % (ref 32–75)
NRBC # BLD: 0 K/UL (ref 0–0.01)
NRBC BLD-RTO: 0 PER 100 WBC
PLATELET # BLD AUTO: 330 K/UL (ref 150–400)
PMV BLD AUTO: 11 FL (ref 8.9–12.9)
POTASSIUM SERPL-SCNC: 3.7 MMOL/L (ref 3.5–5.1)
PROT SERPL-MCNC: 7.6 G/DL (ref 6.4–8.2)
RBC # BLD AUTO: 4.92 M/UL (ref 3.8–5.2)
SODIUM SERPL-SCNC: 138 MMOL/L (ref 136–145)
SPECIMEN HOLD: NORMAL
WBC # BLD AUTO: 9.4 K/UL (ref 3.6–11)

## 2025-05-30 PROCEDURE — 99285 EMERGENCY DEPT VISIT HI MDM: CPT

## 2025-05-30 PROCEDURE — 80053 COMPREHEN METABOLIC PANEL: CPT

## 2025-05-30 PROCEDURE — 6360000004 HC RX CONTRAST MEDICATION: Performed by: RADIOLOGY

## 2025-05-30 PROCEDURE — 85025 COMPLETE CBC W/AUTO DIFF WBC: CPT

## 2025-05-30 PROCEDURE — 70487 CT MAXILLOFACIAL W/DYE: CPT

## 2025-05-30 RX ORDER — METHOCARBAMOL 750 MG/1
750 TABLET, FILM COATED ORAL ONCE
Status: COMPLETED | OUTPATIENT
Start: 2025-05-30 | End: 2025-05-31

## 2025-05-30 RX ORDER — KETOROLAC TROMETHAMINE 30 MG/ML
15 INJECTION, SOLUTION INTRAMUSCULAR; INTRAVENOUS ONCE
Status: COMPLETED | OUTPATIENT
Start: 2025-05-30 | End: 2025-05-31

## 2025-05-30 RX ORDER — IOPAMIDOL 755 MG/ML
100 INJECTION, SOLUTION INTRAVASCULAR
Status: COMPLETED | OUTPATIENT
Start: 2025-05-30 | End: 2025-05-30

## 2025-05-30 RX ADMIN — IOPAMIDOL 100 ML: 755 INJECTION, SOLUTION INTRAVENOUS at 23:51

## 2025-05-30 ASSESSMENT — PAIN DESCRIPTION - ORIENTATION: ORIENTATION: RIGHT

## 2025-05-30 ASSESSMENT — PAIN SCALES - GENERAL: PAINLEVEL_OUTOF10: 10

## 2025-05-30 ASSESSMENT — LIFESTYLE VARIABLES
HOW MANY STANDARD DRINKS CONTAINING ALCOHOL DO YOU HAVE ON A TYPICAL DAY: PATIENT DOES NOT DRINK
HOW OFTEN DO YOU HAVE A DRINK CONTAINING ALCOHOL: NEVER

## 2025-05-30 ASSESSMENT — PAIN DESCRIPTION - DESCRIPTORS: DESCRIPTORS: ACHING;DISCOMFORT

## 2025-05-30 ASSESSMENT — PAIN DESCRIPTION - LOCATION: LOCATION: JAW

## 2025-05-30 ASSESSMENT — PAIN - FUNCTIONAL ASSESSMENT
PAIN_FUNCTIONAL_ASSESSMENT: 0-10
PAIN_FUNCTIONAL_ASSESSMENT: ACTIVITIES ARE NOT PREVENTED

## 2025-05-30 ASSESSMENT — PAIN DESCRIPTION - PAIN TYPE: TYPE: ACUTE PAIN

## 2025-05-30 NOTE — TELEPHONE ENCOUNTER
BP issues, TMJ, Urgent Care Jaw pain was seen in Urgent care today and  ws told to follow up with PCP. Patient has an appointment in July, but would like a call back at [phone].

## 2025-05-31 VITALS
SYSTOLIC BLOOD PRESSURE: 145 MMHG | DIASTOLIC BLOOD PRESSURE: 115 MMHG | HEIGHT: 64 IN | OXYGEN SATURATION: 100 % | WEIGHT: 293 LBS | BODY MASS INDEX: 50.02 KG/M2 | HEART RATE: 73 BPM | RESPIRATION RATE: 16 BRPM | TEMPERATURE: 99.1 F

## 2025-05-31 PROCEDURE — 96374 THER/PROPH/DIAG INJ IV PUSH: CPT

## 2025-05-31 PROCEDURE — 6370000000 HC RX 637 (ALT 250 FOR IP)

## 2025-05-31 PROCEDURE — 6360000002 HC RX W HCPCS

## 2025-05-31 RX ORDER — NAPROXEN 500 MG/1
500 TABLET ORAL 2 TIMES DAILY
Qty: 60 TABLET | Refills: 0 | Status: SHIPPED | OUTPATIENT
Start: 2025-05-31

## 2025-05-31 RX ORDER — PREDNISONE 50 MG/1
50 TABLET ORAL DAILY
Qty: 5 TABLET | Refills: 0 | Status: SHIPPED | OUTPATIENT
Start: 2025-05-31 | End: 2025-06-05

## 2025-05-31 RX ORDER — METHOCARBAMOL 750 MG/1
750 TABLET, FILM COATED ORAL EVERY 6 HOURS PRN
Qty: 16 TABLET | Refills: 0 | Status: SHIPPED | OUTPATIENT
Start: 2025-05-31 | End: 2025-06-04

## 2025-05-31 RX ADMIN — METHOCARBAMOL 750 MG: 750 TABLET ORAL at 01:00

## 2025-05-31 RX ADMIN — AMOXICILLIN AND CLAVULANATE POTASSIUM 1 TABLET: 875; 125 TABLET, FILM COATED ORAL at 01:00

## 2025-05-31 RX ADMIN — KETOROLAC TROMETHAMINE 15 MG: 30 INJECTION, SOLUTION INTRAMUSCULAR; INTRAVENOUS at 01:00

## 2025-05-31 ASSESSMENT — PAIN SCALES - GENERAL: PAINLEVEL_OUTOF10: 10

## 2025-05-31 ASSESSMENT — PAIN - FUNCTIONAL ASSESSMENT: PAIN_FUNCTIONAL_ASSESSMENT: ACTIVITIES ARE NOT PREVENTED

## 2025-05-31 ASSESSMENT — PAIN DESCRIPTION - DESCRIPTORS: DESCRIPTORS: PRESSURE

## 2025-05-31 ASSESSMENT — PAIN DESCRIPTION - LOCATION: LOCATION: JAW

## 2025-05-31 ASSESSMENT — ENCOUNTER SYMPTOMS: SINUS PAIN: 1

## 2025-05-31 ASSESSMENT — PAIN DESCRIPTION - ORIENTATION: ORIENTATION: RIGHT

## 2025-05-31 NOTE — ED PROVIDER NOTES
[facility] EMERGENCY DEPARTMENT  EMERGENCY DEPARTMENT ENCOUNTER      Pt Name: Edison Sales  MRN: [id number]  Birthdate 1995  Date of evaluation: 5/30/2025  Provider: Fiorella Dudley PA-C    CHIEF COMPLAINT       Chief Complaint   Patient presents with    Jaw Pain         HISTORY OF PRESENT ILLNESS   (Location/Symptom, Timing/Onset, Context/Setting, Quality, Duration, Modifying Factors, Severity)  Note limiting factors.   Edison Sales is a 29 y.o. female with history of  has a past medical history of Asthma, BMI 50.0-59.9, adult ([facility]) (2/2/2017), Contact dermatitis and eczema due to cause, Elevated blood sugar level, HSV-2 infection, PCOS (polycystic ovarian syndrome), and Urticaria. who presents from home to [facility] ED with cc of right sided jaw pain. Reports congestion and right ear pain. [facility]. Reports pain is severe. Worsens with movement however it is not more constant even without jaw movement. No injury or trauma to the area.  Denies tooth pain or cavities.      PCP: Joshua Alberts MD    There are no other complaints, changes or physical findings at this time.        The history is provided by the patient.         Review of External Medical Records:     Nursing Notes were reviewed.    REVIEW OF SYSTEMS    (2-9 systems for level 4, 10 or more for level 5)     Review of Systems   HENT:  Positive for sinus pain.        Except as noted above the remainder of the review of systems was reviewed and negative.       PAST MEDICAL HISTORY     Past Medical History:   Diagnosis Date    Asthma     BMI 50.0-59.9, adult ([facility]) 2/2/2017    Contact dermatitis and eczema due to cause     Elevated blood sugar level     HSV-2 infection     PCOS (polycystic ovarian syndrome)     Urticaria          SURGICAL HISTORY       Past Surgical History:   Procedure Laterality Date    EXCIS TENDON SHEATH LESN,WRIST/FORE      TONSILLECTOMY           CURRENT MEDICATIONS

## 2025-05-31 NOTE — ED TRIAGE NOTES
Pt amb to triage w/ c/o rt jaw pain x 1 week. Was diagnosed w/ TMJ @ [facility] today and told to take Tylenol for pain. Denies injury or trauma

## 2025-05-31 NOTE — DISCHARGE INSTRUCTIONS
You were seen today in the emergency department for facial pain.  CT imaging showed sinusitis in the area of pain.  I have sent an antibiotic to the pharmacy to treat the likely bacterial sinus infection.  I also sent an NSAID to help with pain and inflammation as well as a small steroid burst.  I have also sent a muscle relaxer (methocarbamol) that he can take as needed for muscle spasm or tension.  Follow-up with your PCP.  Return for new or worsening symptoms.